# Patient Record
Sex: FEMALE | Race: WHITE | NOT HISPANIC OR LATINO | URBAN - NONMETROPOLITAN AREA
[De-identification: names, ages, dates, MRNs, and addresses within clinical notes are randomized per-mention and may not be internally consistent; named-entity substitution may affect disease eponyms.]

---

## 2017-02-01 DIAGNOSIS — K21.9 GASTROESOPHAGEAL REFLUX DISEASE WITHOUT ESOPHAGITIS: ICD-10-CM

## 2017-02-01 DIAGNOSIS — R10.13 EPIGASTRIC PAIN: ICD-10-CM

## 2017-02-01 RX ORDER — SUCRALFATE 1 G/1
1 TABLET ORAL
Qty: 120 TAB | Refills: 0 | Status: SHIPPED | OUTPATIENT
Start: 2017-02-01 | End: 2017-08-11

## 2017-02-01 RX ORDER — DICYCLOMINE HYDROCHLORIDE 10 MG/1
10 CAPSULE ORAL
Qty: 40 CAP | Refills: 0 | Status: SHIPPED | OUTPATIENT
Start: 2017-02-01 | End: 2017-02-17 | Stop reason: SDUPTHER

## 2017-02-01 NOTE — TELEPHONE ENCOUNTER
I have refilled these. Please remind her that she has appt in February with labs to be done before that visit.

## 2017-02-01 NOTE — TELEPHONE ENCOUNTER
Was the patient seen in the last year in this department? Yes     Does patient have an active prescription for medications requested? No     Received Request Via: Patient     Ruth stating she has been feeling fine until yesterday, severe pain starting again. Asking for refills please.

## 2017-02-06 ENCOUNTER — OFFICE VISIT (OUTPATIENT)
Dept: MEDICAL GROUP | Facility: PHYSICIAN GROUP | Age: 62
End: 2017-02-06
Payer: COMMERCIAL

## 2017-02-06 VITALS
RESPIRATION RATE: 16 BRPM | HEART RATE: 84 BPM | WEIGHT: 128 LBS | DIASTOLIC BLOOD PRESSURE: 74 MMHG | TEMPERATURE: 97.1 F | OXYGEN SATURATION: 97 % | HEIGHT: 63 IN | SYSTOLIC BLOOD PRESSURE: 128 MMHG | BODY MASS INDEX: 22.68 KG/M2

## 2017-02-06 NOTE — MR AVS SNAPSHOT
"        Ruth Castrejonhy   2017 4:00 PM   Office Visit   MRN: [id number]    Department:  [facility]   Dept Phone:  [phone]    Description:  Female : 1955   Provider:  ZENON Rice           Reason for Visit     Rectal Bleeding resolved    Error           Allergies as of 2017     Allergen Noted Reactions    Codeine 10/14/2016   Nausea      You were diagnosed with     ERRONEOUS ENCOUNTER--DISREGARD   [[id number]]         Vital Signs     Blood Pressure Pulse Temperature Respirations Height Weight    128/74 mmHg 84 36.2 °C (97.1 °F) 16 1.6 m (5' 3\") 58.06 kg (128 lb)    Body Mass Index Oxygen Saturation Smoking Status             22.68 kg/m2 97% Former Smoker         Basic Information     Date Of Birth Sex Race Ethnicity Preferred Language    1955 Female White Non- English      Your appointments     2017  1:00 PM   Established Patient with ZENON Rice   [address] [address] [phone]           You will be receiving a confirmation call a few days before your appointment from our automated call confirmation system.              Problem List              ICD-10-CM Priority Class Noted - Resolved    Epigastric pain R10.13   10/14/2016 - Present    Stress F43.9   10/14/2016 - Present    Gastroesophageal reflux disease without esophagitis K21.9   10/14/2016 - Present    Urinary frequency R35.0   10/14/2016 - Present    Fatigue R53.83   11/15/2016 - Present      Health Maintenance        Date Due Completion Dates    IMM DTaP/Tdap/Td Vaccine (1 - Tdap) 10/24/1975 ---    PAP SMEAR 10/24/1977 ---    MAMMOGRAM 10/24/1996 ---    IMM ZOSTER VACCINE 10/24/2016 ---    COLONOSCOPY 10/31/2026 10/31/2016            Current Immunizations     Influenza Vaccine Quad Inj (Pf) 11/15/2016  1:00 PM      Below and/or attached are the medications your provider expects you to take. Review all of your " home medications and newly ordered medications with your provider and/or pharmacist. Follow medication instructions as directed by your provider and/or pharmacist. Please keep your medication list with you and share with your provider. Update the information when medications are discontinued, doses are changed, or new medications (including over-the-counter products) are added; and carry medication information at all times in the event of emergency situations     Allergies:  CODEINE - Nausea               Medications  Valid as of: February 06, 2017 -  4:23 PM    Generic Name Brand Name Tablet Size Instructions for use    Dicyclomine HCl (Cap) BENTYL 10 MG Take 1 Cap by mouth 4 Times a Day,Before Meals and at Bedtime.        Pantoprazole Sodium (Tablet Delayed Response) PROTONIX 40 MG Take 1 Tab by mouth every day.        Solifenacin Succinate (Tab) VESICARE 10 MG Take 1 Tab by mouth every day.        Sucralfate (Tab) CARAFATE 1 GM Take 1 Tab by mouth 4 Times a Day,Before Meals and at Bedtime.        .                 Medicines prescribed today were sent to:     [facility] [address] [place] [id number]    Phone: [phone] Fax: [phone]    Open 24 Hours?: No      Medication refill instructions:       If your prescription bottle indicates you have medication refills left, it is not necessary to call your provider’s office. Please contact your pharmacy and they will refill your medication.    If your prescription bottle indicates you do not have any refills left, you may request refills at any time through one of the following ways: The online Retail Info system (except Urgent Care), by calling your provider’s office, or by asking your pharmacy to contact your provider’s office with a refill request. Medication refills are processed only during regular business hours and may not be available until the next business day. Your provider may request  additional information or to have a follow-up visit with you prior to refilling your medication.   *Please Note: Medication refills are assigned a new Rx number when refilled electronically. Your pharmacy may indicate that no refills were authorized even though a new prescription for the same medication is available at the pharmacy. Please request the medicine by name with the pharmacy before contacting your provider for a refill.           [facility] Access Code: [email or web address]  Expires: 3/8/2017  4:23 PM    Your email address is not on file at [facility].  Email Addresses are required for you to sign up for [facility], please contact [phone] to verify your personal information and to provide your email address prior to attempting to register for [facility] [address]    [facility]  A secure, online tool to manage your health information     [facility]’s [facility]® is a secure, online tool that connects you to your personalized health information from the privacy of your home -- day or night - making it very easy for you to manage your healthcare. Once the activation process is completed, you can even access your medical information using the [facility] aj, which is available for free in the Apple Aj store or Google Play store.     To learn more about [facility], visit [email or web address]    There are two levels of access available (as shown below):   My Chart Features  [facility] Primary Care Doctor [facility]  Specialists [facility]  Urgent  Care Non-[facility] Primary Care Doctor   Email your healthcare team securely and privately 24/7 X X X    Manage appointments: schedule your next appointment; view details of past/upcoming appointments X      Request prescription refills. X      View recent personal medical records, including lab and immunizations X X X X   View health record, including health history, allergies, medications X X X X   Read reports about your outpatient visits,  procedures, consult and ER notes X X X X   See your discharge summary, which is a recap of your hospital and/or ER visit that includes your diagnosis, lab results, and care plan X X  X     How to register for [employer]:  Once your e-mail address has been verified, follow the following steps to sign up for [employer].     1. Go to  [email or web address]  2. Click on the Sign Up Now box, which takes you to the New Member Sign Up page. You will need to provide the following information:  a. Enter your [employer] Access Code exactly as it appears at the top of this page. (You will not need to use this code after you’ve completed the sign-up process. If you do not sign up before the expiration date, you must request a new code.)   b. Enter your date of birth.   c. Enter your home email address.   d. Click Submit, and follow the next screen’s instructions.  3. Create a [employer] ID. This will be your [employer] login ID and cannot be changed, so think of one that is secure and easy to remember.  4. Create a [employer] password. You can change your password at any time.  5. Enter your Password Reset Question and Answer. This can be used at a later time if you forget your password.   6. Enter your e-mail address. This allows you to receive e-mail notifications when new information is available in [employer].  7. Click Sign Up. You can now view your health information.    For assistance activating your [employer] account, call [phone]

## 2017-02-10 ENCOUNTER — HOSPITAL ENCOUNTER (OUTPATIENT)
Dept: LAB | Facility: MEDICAL CENTER | Age: 62
End: 2017-02-10
Attending: NURSE PRACTITIONER
Payer: COMMERCIAL

## 2017-02-10 DIAGNOSIS — E55.9 VITAMIN D DEFICIENCY: ICD-10-CM

## 2017-02-10 DIAGNOSIS — R53.83 FATIGUE, UNSPECIFIED TYPE: ICD-10-CM

## 2017-02-10 LAB
25(OH)D3 SERPL-MCNC: 16 NG/ML (ref 30–100)
FERRITIN SERPL-MCNC: 192.4 NG/ML (ref 10–291)

## 2017-02-10 PROCEDURE — 36415 COLL VENOUS BLD VENIPUNCTURE: CPT

## 2017-02-10 PROCEDURE — 82306 VITAMIN D 25 HYDROXY: CPT

## 2017-02-10 PROCEDURE — 82728 ASSAY OF FERRITIN: CPT

## 2017-02-17 ENCOUNTER — HOSPITAL ENCOUNTER (OUTPATIENT)
Facility: MEDICAL CENTER | Age: 62
End: 2017-02-17
Attending: NURSE PRACTITIONER
Payer: COMMERCIAL

## 2017-02-17 ENCOUNTER — OFFICE VISIT (OUTPATIENT)
Dept: MEDICAL GROUP | Facility: PHYSICIAN GROUP | Age: 62
End: 2017-02-17
Payer: COMMERCIAL

## 2017-02-17 VITALS
WEIGHT: 128 LBS | TEMPERATURE: 98.5 F | DIASTOLIC BLOOD PRESSURE: 84 MMHG | BODY MASS INDEX: 22.68 KG/M2 | HEIGHT: 63 IN | SYSTOLIC BLOOD PRESSURE: 136 MMHG | OXYGEN SATURATION: 96 % | RESPIRATION RATE: 16 BRPM | HEART RATE: 96 BPM

## 2017-02-17 DIAGNOSIS — Z12.4 SCREENING FOR CERVICAL CANCER: ICD-10-CM

## 2017-02-17 DIAGNOSIS — Z12.39 SCREENING FOR BREAST CANCER: ICD-10-CM

## 2017-02-17 DIAGNOSIS — R35.0 URINARY FREQUENCY: ICD-10-CM

## 2017-02-17 DIAGNOSIS — R79.89 ELEVATED FERRITIN: ICD-10-CM

## 2017-02-17 DIAGNOSIS — E55.9 VITAMIN D DEFICIENCY: ICD-10-CM

## 2017-02-17 DIAGNOSIS — K21.9 GASTROESOPHAGEAL REFLUX DISEASE WITHOUT ESOPHAGITIS: ICD-10-CM

## 2017-02-17 DIAGNOSIS — R10.13 EPIGASTRIC PAIN: ICD-10-CM

## 2017-02-17 PROCEDURE — 87624 HPV HI-RISK TYP POOLED RSLT: CPT

## 2017-02-17 PROCEDURE — 99214 OFFICE O/P EST MOD 30 MIN: CPT | Performed by: NURSE PRACTITIONER

## 2017-02-17 PROCEDURE — 88175 CYTOPATH C/V AUTO FLUID REDO: CPT

## 2017-02-17 RX ORDER — SOLIFENACIN SUCCINATE 10 MG/1
10 TABLET, FILM COATED ORAL DAILY
Qty: 90 TAB | Refills: 1 | Status: SHIPPED | OUTPATIENT
Start: 2017-02-17 | End: 2017-06-28 | Stop reason: SDUPTHER

## 2017-02-17 RX ORDER — DICYCLOMINE HYDROCHLORIDE 10 MG/1
10 CAPSULE ORAL
Qty: 60 CAP | Refills: 1 | Status: SHIPPED | OUTPATIENT
Start: 2017-02-17 | End: 2017-08-11

## 2017-02-17 RX ORDER — PANTOPRAZOLE SODIUM 40 MG/1
40 TABLET, DELAYED RELEASE ORAL DAILY
Qty: 90 TAB | Refills: 1 | Status: SHIPPED | OUTPATIENT
Start: 2017-02-17 | End: 2017-06-28 | Stop reason: SDUPTHER

## 2017-02-17 NOTE — ASSESSMENT & PLAN NOTE
Chronic in nature, stable, controlled on Vesicare 10 mg daily. She does need refills, these were called in for her. She tolerates the medication well with no significant bothersome side effects. We will monitor this annually and as needed.

## 2017-02-17 NOTE — ASSESSMENT & PLAN NOTE
Chronic in nature, stable well-controlled on Protonix, as needed Bentyl and occasional use of Carafate. She does need refills on her Protonix, this was called in for her. I also gave her educational material on foods to avoid with reflux. She tolerates the medication well with no significant bothersome side effects. We will monitor this every 6 months and as needed.

## 2017-02-17 NOTE — ASSESSMENT & PLAN NOTE
Patient here for well woman exam with Pap smear. We will also check for HPV as well. She tells me she has had 2 abnormal Pap smears in the past. She does not know the exact nature of the abnormality. She does not have any pressing concerns with vaginal discharge, odor or itch. She is postmenopausal and does have vaginal atrophy and at times painful intercourse. She does not complain of postmenopausal bleeding. I did tell her I would call her with results and any further actions if needed.  She tells me she has not had a mammogram done in several years, this was ordered. She denies doing monthly breast self exams. Mammogram is ordered.

## 2017-02-17 NOTE — ASSESSMENT & PLAN NOTE
Patient here to review labs, her vitamin D level is 16. This is minimally improved from 11. She tells me she is only taking 400 units over-the-counter. She was unable to  the 50,000 units that was called to her pharmacy due to cost. I have encouraged her to take 2000 units daily she can get this over-the-counter. We will recheck this in 6 months.

## 2017-02-17 NOTE — PROGRESS NOTES
Chief Complaint   Patient presents with   • Follow-Up     Labs         This is a 61 y.o.female patient that presents today with the following: Follow-up visit, acute and chronic conditions, Pap smear    Gastroesophageal reflux disease without esophagitis  Chronic in nature, stable well-controlled on Protonix, as needed Bentyl and occasional use of Carafate. She does need refills on her Protonix, this was called in for her. I also gave her educational material on foods to avoid with reflux. She tolerates the medication well with no significant bothersome side effects. We will monitor this every 6 months and as needed.    Urinary frequency  Chronic in nature, stable, controlled on Vesicare 10 mg daily. She does need refills, these were called in for her. She tolerates the medication well with no significant bothersome side effects. We will monitor this annually and as needed.    Vitamin D deficiency  Patient here to review labs, her vitamin D level is 16. This is minimally improved from 11. She tells me she is only taking 400 units over-the-counter. She was unable to  the 50,000 units that was called to her pharmacy due to cost. I have encouraged her to take 2000 units daily she can get this over-the-counter. We will recheck this in 6 months.    Screening for cervical cancer  Patient here for well woman exam with Pap smear. We will also check for HPV as well. She tells me she has had 2 abnormal Pap smears in the past. She does not know the exact nature of the abnormality. She does not have any pressing concerns with vaginal discharge, odor or itch. She is postmenopausal and does have vaginal atrophy and at times painful intercourse. She does not complain of postmenopausal bleeding. I did tell her I would call her with results and any further actions if needed.  She tells me she has not had a mammogram done in several years, this was ordered. She denies doing monthly breast self exams. Mammogram is  ordered.    Elevated ferritin  Back in November 2016 patient's ferritin level was 657.5. During that time she was having a lot of gastrointestinal issues, with inflammation seen on EGD and colonoscopy. She denied use of iron supplements, she does not have family history of hemachromatosis. She is here today review of repeat ferritin which is now within normal limits. I did discuss with her that her elevation was likely due to an inflammatory process but we will continue to monitor this. We'll recheck again in 6 months.      Hospital Outpatient Visit on 02/10/2017   Component Date Value   • Ferritin 02/10/2017 192.4    • 25-Hydroxy   Vitamin D 25 02/10/2017 16*         clinical course has been stable    Past Medical History   Diagnosis Date   • GERD (gastroesophageal reflux disease)        Past Surgical History   Procedure Laterality Date   • Other       surgery for deviated septum       Family History   Problem Relation Age of Onset   • Hypertension Mother    • Diabetes Father    • Heart Attack Father    • Stroke Father        Codeine    Current Outpatient Prescriptions Ordered in [facility]   Medication Sig Dispense Refill   • pantoprazole (PROTONIX) 40 MG Tablet Delayed Response Take 1 Tab by mouth every day. 90 Tab 1   • solifenacin (VESICARE) 10 MG tablet Take 1 Tab by mouth every day. 90 Tab 1   • dicyclomine (BENTYL) 10 MG Cap Take 1 Cap by mouth 4 Times a Day,Before Meals and at Bedtime. 60 Cap 1   • sucralfate (CARAFATE) 1 GM Tab Take 1 Tab by mouth 4 Times a Day,Before Meals and at Bedtime. 120 Tab 0     No current Epic-ordered facility-administered medications on file.       Constitutional ROS: No unexpected change in weight, No weakness, No unexplained fevers, sweats, or chills  Neck ROS: No lumps or masses, No swollen glands, No significant pain in neck  Pulmonary ROS: No chronic cough, sputum, or hemoptysis, No shortness of breath, No recent change in breathing  Cardiovascular ROS: No chest pain, No edema, No  "palpitations  Gastrointestinal ROS: Positive for reflux, per history of present illness  Breast ROS: No new breast lumps or masses, No severe breast pain, No nipple discharge  Musculoskeletal/Extremities ROS: No clubbing, No cyanosis, No pain, redness or swelling on the joints  Neurologic ROS: Normal development, No seizures, No weakness    Physical exam:  /84 mmHg  Pulse 96  Temp(Src) 36.9 °C (98.5 °F)  Resp 16  Ht 1.6 m (5' 3\")  Wt 58.06 kg (128 lb)  BMI 22.68 kg/m2  SpO2 96%  Breastfeeding? No  General Appearance: Middle-aged female, alert, no distress, well-nourished, well-groomed  Skin: Skin color, texture, turgor normal. No rashes or lesions.  Lungs: negative findings: normal respiratory rate and rhythm, lungs clear to auscultation  Heart: negative. RRR without murmur, gallop, or rubs.  No ectopy.  Breast: negative findings: normal in size, skin normal, palpation negative for masses or nodules, no palpable axillary lymphadenopathy, positive findings: Slightly irregular shaped left breast  Abdomen: Abdomen soft, non-tender. BS normal. No masses,  No organomegaly  Musculoskeletal: negative  Neurologic: intact, oriented, mood appropriate, judgment intact. Cranial nerves II-12 grossly intact  Pelvic: External genitalia normal, cervical os very small, and vagina rugae mildly atrophied., Bimanual exam normal.    Medical decision making/discussion: Patient here for follow-up visit along with well woman exam. I will call her with results and further actions if needed. She is also here for follow-up of multiple chronic conditions and medication refills. We reviewed labs. She is to continue with vitamin D, 2000 units daily and we will recheck in 6 months. We will also recheck a ferritin level in 6 months. I have given her educational material on GERD as well as IBS. I have encouraged her to stand probiotic. She is to have mammogram done. I will see her back in 6 months for follow-up.    Ruth was seen " today for follow-up.    Diagnoses and all orders for this visit:    Gastroesophageal reflux disease without esophagitis  -     pantoprazole (PROTONIX) 40 MG Tablet Delayed Response; Take 1 Tab by mouth every day.  -     dicyclomine (BENTYL) 10 MG Cap; Take 1 Cap by mouth 4 Times a Day,Before Meals and at Bedtime.    Epigastric pain  -     dicyclomine (BENTYL) 10 MG Cap; Take 1 Cap by mouth 4 Times a Day,Before Meals and at Bedtime.    Urinary frequency  -     solifenacin (VESICARE) 10 MG tablet; Take 1 Tab by mouth every day.    Vitamin D deficiency  -     VITAMIN D,25 HYDROXY; Future    Screening for cervical cancer  -     THINPREP PAP WITH HPV; Future    Screening for breast cancer  -     MA-SCREEN MAMMO W/CAD-BILAT; Future    Elevated ferritin  -     FERRITIN; Future          Please note that this dictation was created using voice recognition software. I have made every reasonable attempt to correct obvious errors, but I expect that there are errors of grammar and possibly content that I did not discover before finalizing the note.

## 2017-02-17 NOTE — PATIENT INSTRUCTIONS
Take OTC vitamin D 2000 units daily--will recheck in 6 months    Stay on probiotic    Will call with results of PAP/HPV and any further actions if needed    Follow up in 6 months--have labs before that visit    Mammogram            Food Choices for Gastroesophageal Reflux Disease, Adult  When you have gastroesophageal reflux disease (GERD), the foods you eat and your eating habits are very important. Choosing the right foods can help ease the discomfort of GERD.  WHAT GENERAL GUIDELINES DO I NEED TO FOLLOW?  · Choose fruits, vegetables, whole grains, low-fat dairy products, and low-fat meat, fish, and poultry.  · Limit fats such as oils, salad dressings, butter, nuts, and avocado.  · Keep a food diary to identify foods that cause symptoms.  · Avoid foods that cause reflux. These may be different for different people.  · Eat frequent small meals instead of three large meals each day.  · Eat your meals slowly, in a relaxed setting.  · Limit fried foods.  · Cook foods using methods other than frying.  · Avoid drinking alcohol.  · Avoid drinking large amounts of liquids with your meals.  · Avoid bending over or lying down until 2-3 hours after eating.  WHAT FOODS ARE NOT RECOMMENDED?  The following are some foods and drinks that may worsen your symptoms:  Vegetables  Tomatoes. Tomato juice. Tomato and spaghetti sauce. Chili peppers. Onion and garlic. Horseradish.  Fruits  Oranges, grapefruit, and lemon (fruit and juice).  Meats  High-fat meats, fish, and poultry. This includes hot dogs, ribs, ham, sausage, salami, and clark.  Dairy  Whole milk and chocolate milk. Sour cream. Cream. Butter. Ice cream. Cream cheese.   Beverages  Coffee and tea, with or without caffeine. Carbonated beverages or energy drinks.  Condiments  Hot sauce. Barbecue sauce.   Sweets/Desserts  Chocolate and cocoa. Donuts. Peppermint and spearmint.  Fats and Oils  High-fat foods, including French fries and potato chips.  Other  Vinegar. Strong  spices, such as black pepper, white pepper, red pepper, cayenne, bella powder, cloves, guilherme, and chili powder.  The items listed above may not be a complete list of foods and beverages to avoid. Contact your dietitian for more information.     This information is not intended to replace advice given to you by your health care provider. Make sure you discuss any questions you have with your health care provider.     Document Released: 12/18/2006 Document Revised: 01/08/2016 Document Reviewed: 10/22/2014  [employer] Interactive Patient Education ©2016 [employer].    Irritable Bowel Syndrome, Adult  Irritable bowel syndrome (IBS) is not one specific disease. It is a group of symptoms that affects the organs responsible for digestion (gastrointestinal or GI tract).   To regulate how your GI tract works, your body sends signals back and forth between your intestines and your brain. If you have IBS, there may be a problem with these signals. As a result, your GI tract does not function normally. Your intestines may become more sensitive and overreact to certain things. This is especially true when you eat certain foods or when you are under stress.   There are four types of IBS. These may be determined based on the consistency of your stool:   · IBS with diarrhea.    · IBS with constipation.    · Mixed IBS.    · Unsubtyped IBS.    It is important to know which type of IBS you have. Some treatments are more likely to be helpful for certain types of IBS.   CAUSES   The exact cause of IBS is not known.  RISK FACTORS  You may have a higher risk of IBS if:  · You are a woman.  · You are younger than 45 years old.  · You have a family history of IBS.  · You have mental health problems.  · You have had bacterial infection of your GI tract.  SIGNS AND SYMPTOMS   Symptoms of IBS vary from person to person. The main symptom is abdominal pain or discomfort. Additional symptoms usually include one or more of the following:    · Diarrhea, constipation, or both.    · Abdominal swelling or bloating.    · Feeling full or sick after eating a small or regular-size meal.    · Frequent gas.    · Mucus in the stool.    · A feeling of having more stool left after a bowel movement.    Symptoms tend to come and go. They may be associated with stress, psychiatric conditions, or nothing at all.   DIAGNOSIS   There is no specific test to diagnose IBS. Your health care provider will make a diagnosis based on a physical exam, medical history, and your symptoms. You may have other tests to rule out other conditions that may be causing your symptoms. These may include:   · Blood tests.    · X-rays.    · CT scan.  · Endoscopy and colonoscopy. This is a test in which your GI tract is viewed with a long, thin, flexible tube.  TREATMENT  There is no cure for IBS, but treatment can help relieve symptoms. IBS treatment often includes:   · Changes to your diet, such as:  ¨ Eating more fiber.  ¨ Avoiding foods that cause symptoms.  ¨ Drinking more water.  ¨ Eating regular, medium-sized portioned meals.  · Medicines. These may include:  ¨ Fiber supplements if you have constipation.  ¨ Medicine to control diarrhea (antidiarrheal medicines).  ¨ Medicine to help control muscle spasms in your GI tract (antispasmodic medicines).  ¨ Medicines to help with any mental health issues, such as antidepressants or tranquilizers.  · Therapy.  ¨ Talk therapy may help with anxiety, depression, or other mental health issues that can make IBS symptoms worse.  · Stress reduction.  ¨ Managing your stress can help keep symptoms under control.  HOME CARE INSTRUCTIONS   · Take medicines only as directed by your health care provider.  · Eat a healthy diet.  ¨ Avoid foods and drinks with added sugar.  ¨ Include more whole grains, fruits, and vegetables gradually into your diet. This may be especially helpful if you have IBS with constipation.  ¨ Avoid any foods and drinks that make your  symptoms worse. These may include dairy products and caffeinated or carbonated drinks.  ¨ Do not eat large meals.  ¨ Drink enough fluid to keep your urine clear or pale yellow.  · Exercise regularly. Ask your health care provider for recommendations of good activities for you.  · Keep all follow-up visits as directed by your health care provider. This is important.  SEEK MEDICAL CARE IF:   · You have constant pain.  · You have trouble or pain with swallowing.  · You have worsening diarrhea.  SEEK IMMEDIATE MEDICAL CARE IF:   · You have severe and worsening abdominal pain.    · You have diarrhea and:    ¨ You have a rash, stiff neck, or severe headache.    ¨ You are irritable, sleepy, or difficult to awaken.    ¨ You are weak, dizzy, or extremely thirsty.    · You have bright red blood in your stool or you have black tarry stools.    · You have unusual abdominal swelling that is painful.    · You vomit continuously.    · You vomit blood (hematemesis).    · You have both abdominal pain and a fever.         This information is not intended to replace advice given to you by your health care provider. Make sure you discuss any questions you have with your health care provider.     Document Released: 12/18/2006 Document Revised: 01/08/2016 Document Reviewed: 09/04/2015  [employer] Interactive Patient Education ©2016 [employer] Inc.

## 2017-02-17 NOTE — ASSESSMENT & PLAN NOTE
Back in November 2016 patient's ferritin level was 657.5. During that time she was having a lot of gastrointestinal issues, with inflammation seen on EGD and colonoscopy. She denied use of iron supplements, she does not have family history of hemachromatosis. She is here today review of repeat ferritin which is now within normal limits. I did discuss with her that her elevation was likely due to an inflammatory process but we will continue to monitor this. We'll recheck again in 6 months.

## 2017-02-19 LAB
CYTOLOGY REG CYTOL: NORMAL
HPV HR 12 DNA CVX QL NAA+PROBE: NEGATIVE
HPV16 DNA SPEC QL NAA+PROBE: NEGATIVE
HPV18 DNA SPEC QL NAA+PROBE: NEGATIVE
SPECIMEN SOURCE: NORMAL

## 2017-04-27 ENCOUNTER — HOSPITAL ENCOUNTER (OUTPATIENT)
Dept: RADIOLOGY | Facility: MEDICAL CENTER | Age: 62
End: 2017-04-27
Attending: NURSE PRACTITIONER
Payer: COMMERCIAL

## 2017-04-27 DIAGNOSIS — Z12.39 SCREENING FOR BREAST CANCER: ICD-10-CM

## 2017-04-27 PROCEDURE — G0202 SCR MAMMO BI INCL CAD: HCPCS

## 2017-06-28 DIAGNOSIS — K21.9 GASTROESOPHAGEAL REFLUX DISEASE WITHOUT ESOPHAGITIS: ICD-10-CM

## 2017-06-28 DIAGNOSIS — R35.0 URINARY FREQUENCY: ICD-10-CM

## 2017-06-28 RX ORDER — PANTOPRAZOLE SODIUM 40 MG/1
40 TABLET, DELAYED RELEASE ORAL DAILY
Qty: 90 TAB | Refills: 0 | Status: SHIPPED | OUTPATIENT
Start: 2017-06-28 | End: 2017-08-17 | Stop reason: SDUPTHER

## 2017-06-28 RX ORDER — SOLIFENACIN SUCCINATE 10 MG/1
10 TABLET, FILM COATED ORAL DAILY
Qty: 90 TAB | Refills: 0 | Status: SHIPPED | OUTPATIENT
Start: 2017-06-28 | End: 2017-11-13 | Stop reason: SDUPTHER

## 2017-08-04 ENCOUNTER — HOSPITAL ENCOUNTER (OUTPATIENT)
Dept: LAB | Facility: MEDICAL CENTER | Age: 62
End: 2017-08-04
Attending: NURSE PRACTITIONER
Payer: COMMERCIAL

## 2017-08-04 DIAGNOSIS — R79.89 ELEVATED FERRITIN: ICD-10-CM

## 2017-08-04 DIAGNOSIS — E55.9 VITAMIN D DEFICIENCY: ICD-10-CM

## 2017-08-04 LAB
25(OH)D3 SERPL-MCNC: 11 NG/ML (ref 30–100)
FERRITIN SERPL-MCNC: 154 NG/ML (ref 10–291)

## 2017-08-04 PROCEDURE — 36415 COLL VENOUS BLD VENIPUNCTURE: CPT

## 2017-08-04 PROCEDURE — 82306 VITAMIN D 25 HYDROXY: CPT

## 2017-08-04 PROCEDURE — 82728 ASSAY OF FERRITIN: CPT

## 2017-08-07 DIAGNOSIS — E55.9 VITAMIN D DEFICIENCY: ICD-10-CM

## 2017-08-11 ENCOUNTER — OFFICE VISIT (OUTPATIENT)
Dept: MEDICAL GROUP | Facility: PHYSICIAN GROUP | Age: 62
End: 2017-08-11
Payer: COMMERCIAL

## 2017-08-11 ENCOUNTER — HOSPITAL ENCOUNTER (OUTPATIENT)
Dept: LAB | Facility: MEDICAL CENTER | Age: 62
End: 2017-08-11
Attending: NURSE PRACTITIONER
Payer: COMMERCIAL

## 2017-08-11 VITALS
RESPIRATION RATE: 12 BRPM | OXYGEN SATURATION: 95 % | HEIGHT: 61 IN | BODY MASS INDEX: 26.43 KG/M2 | WEIGHT: 140 LBS | HEART RATE: 84 BPM | SYSTOLIC BLOOD PRESSURE: 128 MMHG | TEMPERATURE: 99 F | DIASTOLIC BLOOD PRESSURE: 76 MMHG

## 2017-08-11 DIAGNOSIS — R53.83 FATIGUE, UNSPECIFIED TYPE: ICD-10-CM

## 2017-08-11 DIAGNOSIS — K21.9 GASTROESOPHAGEAL REFLUX DISEASE WITHOUT ESOPHAGITIS: ICD-10-CM

## 2017-08-11 DIAGNOSIS — R63.5 WEIGHT GAIN: ICD-10-CM

## 2017-08-11 DIAGNOSIS — R35.0 URINARY FREQUENCY: ICD-10-CM

## 2017-08-11 DIAGNOSIS — E55.9 VITAMIN D DEFICIENCY: ICD-10-CM

## 2017-08-11 LAB
T3 SERPL-MCNC: 79.6 NG/DL (ref 60–181)
T4 FREE SERPL-MCNC: 0.7 NG/DL (ref 0.53–1.43)
TSH SERPL DL<=0.005 MIU/L-ACNC: 4.28 UIU/ML (ref 0.3–3.7)

## 2017-08-11 PROCEDURE — 84443 ASSAY THYROID STIM HORMONE: CPT

## 2017-08-11 PROCEDURE — 84480 ASSAY TRIIODOTHYRONINE (T3): CPT

## 2017-08-11 PROCEDURE — 84439 ASSAY OF FREE THYROXINE: CPT

## 2017-08-11 PROCEDURE — 36415 COLL VENOUS BLD VENIPUNCTURE: CPT

## 2017-08-11 PROCEDURE — 99214 OFFICE O/P EST MOD 30 MIN: CPT | Performed by: NURSE PRACTITIONER

## 2017-08-11 RX ORDER — OXYBUTYNIN CHLORIDE 10 MG/1
10 TABLET, EXTENDED RELEASE ORAL DAILY
Qty: 90 TAB | Refills: 1 | Status: SHIPPED | OUTPATIENT
Start: 2017-08-11 | End: 2017-09-01

## 2017-08-11 RX ORDER — ERGOCALCIFEROL 1.25 MG/1
50000 CAPSULE ORAL
Qty: 12 CAP | Refills: 0 | Status: SHIPPED | OUTPATIENT
Start: 2017-08-11 | End: 2017-11-01 | Stop reason: SDUPTHER

## 2017-08-11 NOTE — ASSESSMENT & PLAN NOTE
Patient continues to have vitamin D deficiency, her most recent vitamin D level was 11, this has actually decreased from 16 about 6 months ago. She states she has continued taking vitamin D supplements--about 1000 units per day. 6 months ago she was prescribed a high dose weekly supplement but she did not ever picked this up from the pharmacy. I will reorder this, and she is also to take over-the-counter supplements on a daily basis--at least 5000 units and we will recheck vitamin D level in 3 months.

## 2017-08-11 NOTE — ASSESSMENT & PLAN NOTE
Patient continues with ongoing fatigue, hair loss and complete lack of energy. She has been checked for iron deficiency anemia, B12 deficiency and hypothyroidism. She states she has been checked for hypothyroidism in the past but not only TSH checked but T4 and T3 as well. She is requesting that these labs be drawn.. Her B12 level in the past was well within normal limits. She does have significant vitamin D deficiency (see additional notes). She will have labs done today and I will notify her of results and further actions if needed. We will recheck vitamin D level in 3 months.

## 2017-08-11 NOTE — PATIENT INSTRUCTIONS
Have labs done--can have done today    Continue on meds    Follow up with me in 3 months, sooner if needed    Vitamin D 50,000 per week in addition to 5000 units daily    Vitamin D level in 3 months

## 2017-08-11 NOTE — ASSESSMENT & PLAN NOTE
This is a chronic condition, stable and usually well controlled on Vesicare 10 mg daily. However, she has been cutting this in half to make medication stretch as her insurance did not cover this but she didn't pay for it out of pocket which is greater than $800 for 90 days. She states it was working much better when she was able to take a full pill.   She is wanting to know if there is anything she can try that is more cost effective. She has never tried oxybutynin XL, we will call this in for her. We will start with 10 mg, she is to take this daily. I will see her back in 3 months for follow-up.

## 2017-08-11 NOTE — PROGRESS NOTES
Chief Complaint   Patient presents with   • Results     labs         This is a 61 y.o.female patient that presents today with the following: Follow-up visit, reviewed labs    Gastroesophageal reflux disease without esophagitis  This is a chronic condition, stable and well controlled on pantoprazole 40 mg daily. She tolerates medication well with no significant bothersome side effects. She states she is good about avoiding aggravating foods and activities. She does not need refills at this time, but can call when needed. We will monitor this at least every 6 months and as needed.    Vitamin D deficiency  Patient continues to have vitamin D deficiency, her most recent vitamin D level was 11, this has actually decreased from 16 about 6 months ago. She states she has continued taking vitamin D supplements--about 1000 units per day. 6 months ago she was prescribed a high dose weekly supplement but she did not ever picked this up from the pharmacy. I will reorder this, and she is also to take over-the-counter supplements on a daily basis--at least 5000 units and we will recheck vitamin D level in 3 months.    Urinary frequency  This is a chronic condition, stable and usually well controlled on Vesicare 10 mg daily. However, she has been cutting this in half to make medication stretch as her insurance did not cover this but she didn't pay for it out of pocket which is greater than $800 for 90 days. She states it was working much better when she was able to take a full pill.   She is wanting to know if there is anything she can try that is more cost effective. She has never tried oxybutynin XL, we will call this in for her. We will start with 10 mg, she is to take this daily. I will see her back in 3 months for follow-up.    Fatigue  Patient continues with ongoing fatigue, hair loss and complete lack of energy. She has been checked for iron deficiency anemia, B12 deficiency and hypothyroidism. She states she has been  checked for hypothyroidism in the past but not only TSH checked but T4 and T3 as well. She is requesting that these labs be drawn.. Her B12 level in the past was well within normal limits. She does have significant vitamin D deficiency (see additional notes). She will have labs done today and I will notify her of results and further actions if needed. We will recheck vitamin D level in 3 months.      Hospital Outpatient Visit on 08/04/2017   Component Date Value   • 25-Hydroxy   Vitamin D 25 08/04/2017 11*   • Ferritin 08/04/2017 154.0          clinical course has been stable    Past Medical History   Diagnosis Date   • GERD (gastroesophageal reflux disease)        Past Surgical History   Procedure Laterality Date   • Other       surgery for deviated septum       Family History   Problem Relation Age of Onset   • Hypertension Mother    • Diabetes Father    • Heart Attack Father    • Stroke Father        Codeine    Current Outpatient Prescriptions Ordered in [facility]   Medication Sig Dispense Refill   • oxybutynin SR (DITROPAN-XL) 10 MG CR tablet Take 1 Tab by mouth every day. 90 Tab 1   • ergocalciferol (DRISDOL) 10727 UNIT capsule Take 1 Cap by mouth every 7 days. 12 Cap 0   • pantoprazole (PROTONIX) 40 MG Tablet Delayed Response Take 1 Tab by mouth every day. 90 Tab 0   • solifenacin (VESICARE) 10 MG tablet Take 1 Tab by mouth every day. 90 Tab 0     No current Epic-ordered facility-administered medications on file.       Constitutional ROS: No unexpected change in weight, No weakness, No unexplained fevers, sweats, or chills  Pulmonary ROS: No chronic cough, sputum, or hemoptysis, No shortness of breath, No recent change in breathing  Cardiovascular ROS: No chest pain, No edema, No palpitations  Gastrointestinal ROS: No abdominal pain, No nausea, vomiting, diarrhea, or constipation, no blood in stool, Positive for reflux, controlled  Musculoskeletal/Extremities ROS: No clubbing, No peripheral edema, No pain, redness  "or swelling on the joints  Neurologic ROS: Normal development, No seizures, No weakness  Endocrine ROS: Positive per history of present illness    Physical exam:  /76 mmHg  Pulse 84  Temp(Src) 37.2 °C (99 °F)  Resp 12  Ht 1.549 m (5' 0.98\")  Wt 63.504 kg (140 lb)  BMI 26.47 kg/m2  SpO2 95%  General Appearance: Middle-aged older female, alert, no distress, well-nourished, well-groomed  Skin: Skin color, texture, turgor normal. No rashes or lesions.  Lungs: negative findings: normal respiratory rate and rhythm, lungs clear to auscultation  Heart: negative. RRR without murmur, gallop, or rubs.  No ectopy.  Abdomen: Abdomen soft, non-tender. BS normal. No masses,  No organomegaly  Musculoskeletal: negative findings: ROM of all joints is normal, no evidence of joint instability, strength normal, no deformities present  Neurologic: intact, oriented, mood appropriate, judgment intact. Cranial nerves II through XII grossly intact    Medical decision making/discussion: Patient here for follow-up visit, review labs. We will change medication to oxybutynin SR, 10 mg daily. She will have labs sent today I will notify her of results and further actions if needed. She is going to start high-dose weekly vitamin D supplements, 50,000 units. She is also to continue on over-the-counter vitamin D supplements daily as well. She will follow-up with me in 3 months, sooner if needed.    Ruth was seen today for results.    Diagnoses and all orders for this visit:    Urinary frequency  -     oxybutynin SR (DITROPAN-XL) 10 MG CR tablet; Take 1 Tab by mouth every day.    Fatigue, unspecified type  -     TSH; Future  -     FREE THYROXINE; Future  -     TRIIDOTHYRONINE; Future    Weight gain    Vitamin D deficiency  -     ergocalciferol (DRISDOL) 75156 UNIT capsule; Take 1 Cap by mouth every 7 days.  -     VITAMIN D,25 HYDROXY; Future    Gastroesophageal reflux disease without esophagitis          Please note that this dictation " was created using voice recognition software. I have made every reasonable attempt to correct obvious errors, but I expect that there are errors of grammar and possibly content that I did not discover before finalizing the note.

## 2017-08-11 NOTE — ASSESSMENT & PLAN NOTE
This is a chronic condition, stable and well controlled on pantoprazole 40 mg daily. She tolerates medication well with no significant bothersome side effects. She states she is good about avoiding aggravating foods and activities. She does not need refills at this time, but can call when needed. We will monitor this at least every 6 months and as needed.

## 2017-08-11 NOTE — MR AVS SNAPSHOT
"        Ruth Shoemaker   2017 1:00 PM   Office Visit   MRN: [id number]    Department:  [facility]   Dept Phone:  [phone]    Description:  Female : 1955   Provider:  ZENON Rice           Reason for Visit     Results labs      Allergies as of 2017     Allergen Noted Reactions    Codeine 10/14/2016   Nausea      You were diagnosed with     Urinary frequency   [788.41.ICD-9-CM]       Fatigue, unspecified type   [6376118]       Weight gain   [188191]       Vitamin D deficiency   [9459626]         Vital Signs     Blood Pressure Pulse Temperature Respirations Height Weight    128/76 mmHg 84 37.2 °C (99 °F) 12 1.549 m (5' 0.98\") 63.504 kg (140 lb)    Body Mass Index Oxygen Saturation Smoking Status             26.47 kg/m2 95% Former Smoker         Basic Information     Date Of Birth Sex Race Ethnicity Preferred Language    1955 Female White Non- English      Your appointments     2017  1:00 PM   Established Patient with ZENON Rice   [address] [address] [phone]           You will be receiving a confirmation call a few days before your appointment from our automated call confirmation system.              Problem List              ICD-10-CM Priority Class Noted - Resolved    Epigastric pain R10.13   10/14/2016 - Present    Stress F43.9   10/14/2016 - Present    Gastroesophageal reflux disease without esophagitis K21.9   10/14/2016 - Present    Urinary frequency R35.0   10/14/2016 - Present    Fatigue R53.83   11/15/2016 - Present    Vitamin D deficiency E55.9   2017 - Present    Screening for cervical cancer Z12.4   2017 - Present    Elevated ferritin R79.89   2017 - Present      Health Maintenance        Date Due Completion Dates    IMM DTaP/Tdap/Td Vaccine (1 - Tdap) 10/24/1975 ---    IMM ZOSTER VACCINE 10/24/2016 ---    IMM INFLUENZA (1) 2017 " 11/15/2016    MAMMOGRAM 4/27/2018 4/27/2017    PAP SMEAR 2/17/2020 2/17/2017    COLONOSCOPY 10/31/2026 10/31/2016            Current Immunizations     Influenza Vaccine Quad Inj (Pf) 11/15/2016  1:00 PM      Below and/or attached are the medications your provider expects you to take. Review all of your home medications and newly ordered medications with your provider and/or pharmacist. Follow medication instructions as directed by your provider and/or pharmacist. Please keep your medication list with you and share with your provider. Update the information when medications are discontinued, doses are changed, or new medications (including over-the-counter products) are added; and carry medication information at all times in the event of emergency situations     Allergies:  CODEINE - Nausea               Medications  Valid as of: August 11, 2017 -  1:37 PM    Generic Name Brand Name Tablet Size Instructions for use    Ergocalciferol (Cap) DRISDOL 07578 UNIT Take 1 Cap by mouth every 7 days.        Oxybutynin Chloride (TABLET SR 24 HR) DITROPAN-XL 10 MG Take 1 Tab by mouth every day.        Pantoprazole Sodium (Tablet Delayed Response) PROTONIX 40 MG Take 1 Tab by mouth every day.        Solifenacin Succinate (Tab) VESICARE 10 MG Take 1 Tab by mouth every day.        .                 Medicines prescribed today were sent to:     [facility] [address] [place] [address]    Phone: [phone] Fax: [phone]    Open 24 Hours?: No    [facility] - [place], [place] - [address] [place] [address]    Phone: [phone] Fax: [phone]    Open 24 Hours?: No      Medication refill instructions:       If your prescription bottle indicates you have medication refills left, it is not necessary to call your provider’s office. Please contact your pharmacy and they will refill your medication.    If your  prescription bottle indicates you do not have any refills left, you may request refills at any time through one of the following ways: The online [employer] system (except Urgent Care), by calling your provider’s office, or by asking your pharmacy to contact your provider’s office with a refill request. Medication refills are processed only during regular business hours and may not be available until the next business day. Your provider may request additional information or to have a follow-up visit with you prior to refilling your medication.   *Please Note: Medication refills are assigned a new Rx number when refilled electronically. Your pharmacy may indicate that no refills were authorized even though a new prescription for the same medication is available at the pharmacy. Please request the medicine by name with the pharmacy before contacting your provider for a refill.        Your To Do List     Future Labs/Procedures Complete By Expires    VITAMIN D,25 HYDROXY  11/10/2017 8/11/2018    FREE THYROXINE  As directed 8/11/2018    TRIIDOTHYRONINE  As directed 8/11/2018    TSH  As directed 8/11/2018      Instructions    Have labs done--can have done today    Continue on meds    Follow up with me in 3 months, sooner if needed    Vitamin D 50,000 per week in addition to 5000 units daily    Vitamin D level in 3 months          [employer] Access Code: Activation code not generated  Current [employer] Status: Active

## 2017-08-13 DIAGNOSIS — R79.89 ELEVATED TSH: ICD-10-CM

## 2017-08-17 DIAGNOSIS — K21.9 GASTROESOPHAGEAL REFLUX DISEASE WITHOUT ESOPHAGITIS: ICD-10-CM

## 2017-08-17 RX ORDER — PANTOPRAZOLE SODIUM 40 MG/1
40 TABLET, DELAYED RELEASE ORAL DAILY
Qty: 90 TAB | Refills: 1 | Status: SHIPPED | OUTPATIENT
Start: 2017-08-17 | End: 2017-09-04 | Stop reason: SDUPTHER

## 2017-08-17 RX ORDER — PANTOPRAZOLE SODIUM 40 MG/1
TABLET, DELAYED RELEASE ORAL
Qty: 90 TAB | Refills: 0 | OUTPATIENT
Start: 2017-08-17

## 2017-08-17 NOTE — TELEPHONE ENCOUNTER
Was the patient seen in the last year in this department? Yes     Does patient have an active prescription for medications requested? No     Received Request Via: Pharmacy      Pt met protocol?: Yes pt last ov 8/17

## 2017-08-29 DIAGNOSIS — K21.9 GASTROESOPHAGEAL REFLUX DISEASE WITHOUT ESOPHAGITIS: ICD-10-CM

## 2017-08-30 RX ORDER — PANTOPRAZOLE SODIUM 40 MG/1
TABLET, DELAYED RELEASE ORAL
Refills: 1 | OUTPATIENT
Start: 2017-08-30

## 2017-09-01 DIAGNOSIS — K21.9 GASTROESOPHAGEAL REFLUX DISEASE WITHOUT ESOPHAGITIS: ICD-10-CM

## 2017-09-01 DIAGNOSIS — R35.0 URINARY FREQUENCY: ICD-10-CM

## 2017-09-01 DIAGNOSIS — R82.90 MALODOROUS URINE: ICD-10-CM

## 2017-09-01 RX ORDER — TOLTERODINE 4 MG/1
4 CAPSULE, EXTENDED RELEASE ORAL DAILY
Qty: 30 CAP | Refills: 3 | Status: SHIPPED | OUTPATIENT
Start: 2017-09-01 | End: 2018-01-27 | Stop reason: SDUPTHER

## 2017-09-04 DIAGNOSIS — K21.9 GASTROESOPHAGEAL REFLUX DISEASE WITHOUT ESOPHAGITIS: ICD-10-CM

## 2017-09-04 RX ORDER — PANTOPRAZOLE SODIUM 40 MG/1
40 TABLET, DELAYED RELEASE ORAL DAILY
Qty: 90 TAB | Refills: 3 | Status: SHIPPED | OUTPATIENT
Start: 2017-09-04 | End: 2018-09-05 | Stop reason: SDUPTHER

## 2017-09-05 ENCOUNTER — HOSPITAL ENCOUNTER (OUTPATIENT)
Dept: LAB | Facility: MEDICAL CENTER | Age: 62
End: 2017-09-05
Attending: NURSE PRACTITIONER
Payer: COMMERCIAL

## 2017-09-05 DIAGNOSIS — R82.90 MALODOROUS URINE: ICD-10-CM

## 2017-09-05 LAB
APPEARANCE UR: CLEAR
BACTERIA #/AREA URNS HPF: ABNORMAL /HPF
BILIRUB UR QL STRIP.AUTO: NEGATIVE
CAOX CRY #/AREA URNS HPF: ABNORMAL /HPF
COLOR UR: YELLOW
CULTURE IF INDICATED INDCX: YES UA CULTURE
EPI CELLS #/AREA URNS HPF: ABNORMAL /HPF
GLUCOSE UR STRIP.AUTO-MCNC: NEGATIVE MG/DL
HYALINE CASTS #/AREA URNS LPF: ABNORMAL /LPF
KETONES UR STRIP.AUTO-MCNC: NEGATIVE MG/DL
LEUKOCYTE ESTERASE UR QL STRIP.AUTO: NEGATIVE
MICRO URNS: ABNORMAL
NITRITE UR QL STRIP.AUTO: POSITIVE
PH UR STRIP.AUTO: 6 [PH]
PROT UR QL STRIP: NEGATIVE MG/DL
RBC # URNS HPF: ABNORMAL /HPF
RBC UR QL AUTO: NEGATIVE
SP GR UR STRIP.AUTO: 1.03
UROBILINOGEN UR STRIP.AUTO-MCNC: 0.2 MG/DL
WBC #/AREA URNS HPF: ABNORMAL /HPF

## 2017-09-05 PROCEDURE — 87186 SC STD MICRODIL/AGAR DIL: CPT

## 2017-09-05 PROCEDURE — 81001 URINALYSIS AUTO W/SCOPE: CPT

## 2017-09-05 PROCEDURE — 87086 URINE CULTURE/COLONY COUNT: CPT

## 2017-09-05 PROCEDURE — 87077 CULTURE AEROBIC IDENTIFY: CPT

## 2017-09-06 DIAGNOSIS — R30.0 DYSURIA: ICD-10-CM

## 2017-09-06 RX ORDER — NITROFURANTOIN 25; 75 MG/1; MG/1
100 CAPSULE ORAL 2 TIMES DAILY
Qty: 10 CAP | Refills: 0 | Status: SHIPPED | OUTPATIENT
Start: 2017-09-06 | End: 2017-11-13

## 2017-09-07 LAB
BACTERIA UR CULT: ABNORMAL
SIGNIFICANT IND 70042: ABNORMAL
SOURCE SOURCE: ABNORMAL

## 2017-09-12 ENCOUNTER — TELEPHONE (OUTPATIENT)
Dept: MEDICAL GROUP | Facility: PHYSICIAN GROUP | Age: 62
End: 2017-09-12

## 2017-09-12 NOTE — TELEPHONE ENCOUNTER
MEDICATION PRIOR AUTHORIZATION NEEDED:    1. Name of Medication: detrol la    2. Requested By (Name of Pharmacy): [facility]     3. Is insurance on file current? yes    4. What is the name & phone number of the 3rd party payor? [employer] [phone]             FINAL PRIOR AUTHORIZATION STATUS:    1.  Name of Medication & Dose: Tolterodine     2. Prior Auth Status: Approved through CoverMyMeds      3. Action Taken: Pharmacy Notified: yes Patient Notified: n/a

## 2017-11-06 ENCOUNTER — HOSPITAL ENCOUNTER (OUTPATIENT)
Dept: LAB | Facility: MEDICAL CENTER | Age: 62
End: 2017-11-06
Attending: NURSE PRACTITIONER
Payer: COMMERCIAL

## 2017-11-06 DIAGNOSIS — R30.0 DYSURIA: ICD-10-CM

## 2017-11-06 DIAGNOSIS — R82.90 MALODOROUS URINE: ICD-10-CM

## 2017-11-06 DIAGNOSIS — R79.89 ELEVATED TSH: ICD-10-CM

## 2017-11-06 DIAGNOSIS — E55.9 VITAMIN D DEFICIENCY: ICD-10-CM

## 2017-11-06 LAB
25(OH)D3 SERPL-MCNC: 46 NG/ML (ref 30–100)
APPEARANCE UR: CLEAR
BACTERIA #/AREA URNS HPF: ABNORMAL /HPF
BILIRUB UR QL STRIP.AUTO: NEGATIVE
COLOR UR: YELLOW
CULTURE IF INDICATED INDCX: YES UA CULTURE
EPI CELLS #/AREA URNS HPF: ABNORMAL /HPF
GLUCOSE UR STRIP.AUTO-MCNC: NEGATIVE MG/DL
HYALINE CASTS #/AREA URNS LPF: ABNORMAL /LPF
KETONES UR STRIP.AUTO-MCNC: NEGATIVE MG/DL
LEUKOCYTE ESTERASE UR QL STRIP.AUTO: ABNORMAL
MICRO URNS: ABNORMAL
NITRITE UR QL STRIP.AUTO: POSITIVE
PH UR STRIP.AUTO: 6 [PH]
PROT UR QL STRIP: NEGATIVE MG/DL
RBC # URNS HPF: ABNORMAL /HPF
RBC UR QL AUTO: NEGATIVE
SP GR UR STRIP.AUTO: 1.01
TSH SERPL DL<=0.005 MIU/L-ACNC: 3.1 UIU/ML (ref 0.3–3.7)
UROBILINOGEN UR STRIP.AUTO-MCNC: 0.2 MG/DL
WBC #/AREA URNS HPF: ABNORMAL /HPF

## 2017-11-06 PROCEDURE — 81001 URINALYSIS AUTO W/SCOPE: CPT

## 2017-11-06 PROCEDURE — 84443 ASSAY THYROID STIM HORMONE: CPT

## 2017-11-06 PROCEDURE — 82306 VITAMIN D 25 HYDROXY: CPT

## 2017-11-06 PROCEDURE — 87077 CULTURE AEROBIC IDENTIFY: CPT

## 2017-11-06 PROCEDURE — 87086 URINE CULTURE/COLONY COUNT: CPT

## 2017-11-06 PROCEDURE — 36415 COLL VENOUS BLD VENIPUNCTURE: CPT

## 2017-11-06 PROCEDURE — 87186 SC STD MICRODIL/AGAR DIL: CPT

## 2017-11-06 RX ORDER — NITROFURANTOIN 25; 75 MG/1; MG/1
100 CAPSULE ORAL 2 TIMES DAILY
Qty: 10 CAP | Refills: 0 | Status: SHIPPED | OUTPATIENT
Start: 2017-11-06 | End: 2017-11-13

## 2017-11-08 LAB
BACTERIA UR CULT: ABNORMAL
SIGNIFICANT IND 70042: ABNORMAL
SOURCE SOURCE: ABNORMAL

## 2017-11-13 ENCOUNTER — OFFICE VISIT (OUTPATIENT)
Dept: MEDICAL GROUP | Facility: PHYSICIAN GROUP | Age: 62
End: 2017-11-13
Payer: COMMERCIAL

## 2017-11-13 VITALS
DIASTOLIC BLOOD PRESSURE: 84 MMHG | SYSTOLIC BLOOD PRESSURE: 142 MMHG | HEIGHT: 63 IN | WEIGHT: 131 LBS | HEART RATE: 92 BPM | RESPIRATION RATE: 20 BRPM | BODY MASS INDEX: 23.21 KG/M2 | OXYGEN SATURATION: 98 % | TEMPERATURE: 97.9 F

## 2017-11-13 DIAGNOSIS — R79.89 ABNORMAL TSH: ICD-10-CM

## 2017-11-13 DIAGNOSIS — R30.0 DYSURIA: ICD-10-CM

## 2017-11-13 DIAGNOSIS — E55.9 VITAMIN D DEFICIENCY: ICD-10-CM

## 2017-11-13 DIAGNOSIS — Z23 NEED FOR INFLUENZA VACCINATION: ICD-10-CM

## 2017-11-13 DIAGNOSIS — R35.0 URINARY FREQUENCY: ICD-10-CM

## 2017-11-13 PROCEDURE — 99214 OFFICE O/P EST MOD 30 MIN: CPT | Mod: 25 | Performed by: NURSE PRACTITIONER

## 2017-11-13 PROCEDURE — 90686 IIV4 VACC NO PRSV 0.5 ML IM: CPT | Performed by: NURSE PRACTITIONER

## 2017-11-13 PROCEDURE — 90471 IMMUNIZATION ADMIN: CPT | Performed by: NURSE PRACTITIONER

## 2017-11-13 RX ORDER — SOLIFENACIN SUCCINATE 10 MG/1
10 TABLET, FILM COATED ORAL DAILY
Qty: 90 TAB | Refills: 1 | Status: SHIPPED
Start: 2017-11-13 | End: 2018-11-14 | Stop reason: SDUPTHER

## 2017-11-13 RX ORDER — SULFAMETHOXAZOLE AND TRIMETHOPRIM 800; 160 MG/1; MG/1
1 TABLET ORAL 2 TIMES DAILY
Qty: 14 TAB | Refills: 0 | Status: SHIPPED | OUTPATIENT
Start: 2017-11-13 | End: 2018-10-16

## 2017-11-13 ASSESSMENT — PATIENT HEALTH QUESTIONNAIRE - PHQ9: CLINICAL INTERPRETATION OF PHQ2 SCORE: 0

## 2017-11-13 NOTE — ASSESSMENT & PLAN NOTE
This is a chronic condition, stable and well-controlled with vitamin D supplementation, 50,000 units weekly. She has successfully brought her vitamin D level up to 46, it was less than 10 when first measured this.

## 2017-11-13 NOTE — PROGRESS NOTES
Chief Complaint   Patient presents with   • Cystitis         This is a 62 y.o.female patient that presents today with the following:Follow-up visit, discuss labs, discuss acute and chronic conditions    Abnormal TSH  62-year-old female patient presents today to go over labs. She has been concerned about her thyroid due to symptoms she is having such as hair loss and fatigue. Her TSH is mildly elevated in August 2017. Her most recent repeat thyroid labs are within normal limits. We will continue to monitor this.    Dysuria  Patient continues to have symptoms of dysuria and lower abdominal pain. She was recently started on Macrobid, she did not tolerate this due to side effects. Will have her take Bactrim DS one pill twice a day for 7 days.    Urinary frequency  This is a chronic condition, fairly well controlled on Detrol LA 4 mg daily. She does report to me that she had the best symptom r controlled with Vesicare 10 mg daily, but has been unable to take this due to cost. In fact it costs her $800 for a 90 day supply due to lack of coverage from her insurance. Her daughter has told her about filling her prescriptions at a [place] pharmacy, she just needs written prescription. When she has found is that it is $300 for a 90 day supply through [employer]. Written prescription was given to her. I encouraged her to continue on the Detrol until her Vesicare can be filled. I did discuss with her referring to urology for further evaluation, she declines at this time due to cost and lack of adequate insurance coverage. She states she simply cannot afford to see urologist at this time.    Vitamin D deficiency  This is a chronic condition, stable and well-controlled with vitamin D supplementation, 50,000 units weekly. She has successfully brought her vitamin D level up to 46, it was less than 10 when first measured this.      Hospital Outpatient Visit on 11/06/2017   Component Date Value   • 25-Hydroxy   Vitamin D 25 11/06/2017 46     • TSH 11/06/2017 3.100    • Color 11/06/2017 Yellow    • Character 11/06/2017 Clear    • Specific Gravity 11/06/2017 1.009    • Ph 11/06/2017 6.0    • Glucose 11/06/2017 Negative    • Ketones 11/06/2017 Negative    • Protein 11/06/2017 Negative    • Bilirubin 11/06/2017 Negative    • Urobilinogen, Urine 11/06/2017 0.2    • Nitrite 11/06/2017 Positive*   • Leukocyte Esterase 11/06/2017 Moderate*   • Occult Blood 11/06/2017 Negative    • Micro Urine Req 11/06/2017 Microscopic    • Culture Indicated 11/06/2017 Yes    • Significant Indicator 11/08/2017 POS    • Source 11/08/2017 UR    • Urine Culture 11/08/2017 *                    Value:Escherichia coli  >100,000 cfu/mL     • WBC 11/06/2017 5-10*   • RBC 11/06/2017 0-2    • Bacteria 11/06/2017 Many*   • Epithelial Cells 11/06/2017 Few    • Hyaline Cast 11/06/2017 0-2          clinical course has been stable    Past Medical History:   Diagnosis Date   • GERD (gastroesophageal reflux disease)        Past Surgical History:   Procedure Laterality Date   • OTHER      surgery for deviated septum       Family History   Problem Relation Age of Onset   • Hypertension Mother    • Diabetes Father    • Heart Attack Father    • Stroke Father        Codeine    Current Outpatient Prescriptions Ordered in [facility]   Medication Sig Dispense Refill   • solifenacin (VESICARE) 10 MG tablet Take 1 Tab by mouth every day. 90 Tab 1   • sulfamethoxazole-trimethoprim (BACTRIM DS) 800-160 MG tablet Take 1 Tab by mouth 2 times a day. 14 Tab 0   • vitamin D, Ergocalciferol, (DRISDOL) 87243 units Cap capsule TAKE ONE CAPSULE BY MOUTH ONCE A WEEK 12 Cap 0   • pantoprazole (PROTONIX) 40 MG Tablet Delayed Response Take 1 Tab by mouth every day. 90 Tab 3   • tolterodine ER (DETROL LA) 4 MG CAPSULE SR 24 HR Take 1 Cap by mouth every day. 30 Cap 3     No current Epic-ordered facility-administered medications on file.        Constitutional ROS: No unexpected change in weight, No weakness, No unexplained  "fevers, sweats, or chills  Pulmonary ROS: No chronic cough, sputum, or hemoptysis, No shortness of breath, No recent change in breathing  Cardiovascular ROS: No chest pain, No edema, No palpitations  Gastrointestinal ROS: No abdominal pain, No nausea, vomiting, diarrhea, or constipation, no blood in stool  Musculoskeletal/Extremities ROS: No clubbing, No peripheral edema, No pain, redness or swelling on the joints  Neurologic ROS: Normal development, No seizures, No weakness  Genitourinary ROS: Positive per history of present illness  Endocrine ROS: Positive per history of present illness    Physical exam:  /84   Pulse 92   Temp 36.6 °C (97.9 °F)   Resp 20   Ht 1.6 m (5' 3\")   Wt 59.4 kg (131 lb)   SpO2 98%   BMI 23.21 kg/m²   General Appearance: Middle-aged older female, alert, no distress, well-nourished, well-groomed  Skin: Skin color, texture, turgor normal. No rashes or lesions.  Lungs: negative findings: normal respiratory rate and rhythm, lungs clear to auscultation  Heart: negative. RRR without murmur, gallop, or rubs.  No ectopy.  Abdomen: Abdomen soft, non-tender. BS normal. No masses,  No organomegaly  Musculoskeletal: negative findings: ROM of all joints is normal, no evidence of joint instability, strength normal, no deformities present  Neurologic: intact, oriented, mood appropriate, judgment intact. Cranial nerves II-12 grossly intact    Medical decision making/discussion: Patient is to follow-up with me in 6 months, sooner if needed. I would like her to have labs to before she follows up with me, she will get flu shot today. Written prescription for Vesicare was given to her since she consented to [place] pharmacy. She is to bring in lab form for me to fill out so that she can get low-cost labs done.  She is to stop the Macrobid, we'll have her start Bactrim DS, one pill twice a day for 7 days.  Ruth was seen today for cystitis.    Diagnoses and all orders for this visit:    Urinary " frequency  -     solifenacin (VESICARE) 10 MG tablet; Take 1 Tab by mouth every day.    Dysuria  -     sulfamethoxazole-trimethoprim (BACTRIM DS) 800-160 MG tablet; Take 1 Tab by mouth 2 times a day.    Abnormal TSH    Vitamin D deficiency    Need for influenza vaccination  -     INFLUENZA VACCINE QUAD INJ >3Y(PF)          Please note that this dictation was created using voice recognition software. I have made every reasonable attempt to correct obvious errors, but I expect that there are errors of grammar and possibly content that I did not discover before finalizing the note.

## 2017-11-13 NOTE — PATIENT INSTRUCTIONS
Follow up with me in 6 months, sooner if needed    Stop the Macrobid and start Bactrim DS 1 pill twice daily for 7 days    Flu shot    Bring in that lab form

## 2017-11-13 NOTE — ASSESSMENT & PLAN NOTE
This is a chronic condition, fairly well controlled on Detrol LA 4 mg daily. She does report to me that she had the best symptom r controlled with Vesicare 10 mg daily, but has been unable to take this due to cost. In fact it costs her $800 for a 90 day supply due to lack of coverage from her insurance. Her daughter has told her about filling her prescriptions at a [place] pharmacy, she just needs written prescription. When she has found is that it is $300 for a 90 day supply through [employer]. Written prescription was given to her. I encouraged her to continue on the Detrol until her Vesicare can be filled. I did discuss with her referring to urology for further evaluation, she declines at this time due to cost and lack of adequate insurance coverage. She states she simply cannot afford to see urologist at this time.

## 2017-11-13 NOTE — ASSESSMENT & PLAN NOTE
62-year-old female patient presents today to go over labs. She has been concerned about her thyroid due to symptoms she is having such as hair loss and fatigue. Her TSH is mildly elevated in August 2017. Her most recent repeat thyroid labs are within normal limits. We will continue to monitor this.

## 2017-11-13 NOTE — ASSESSMENT & PLAN NOTE
Patient continues to have symptoms of dysuria and lower abdominal pain. She was recently started on Macrobid, she did not tolerate this due to side effects. Will have her take Bactrim DS one pill twice a day for 7 days.

## 2018-01-27 DIAGNOSIS — R35.0 URINARY FREQUENCY: ICD-10-CM

## 2018-01-27 DIAGNOSIS — E55.9 VITAMIN D DEFICIENCY: ICD-10-CM

## 2018-01-30 RX ORDER — TOLTERODINE 4 MG/1
CAPSULE, EXTENDED RELEASE ORAL
Qty: 90 CAP | Refills: 0 | Status: SHIPPED | OUTPATIENT
Start: 2018-01-30 | End: 2018-11-14

## 2018-01-30 RX ORDER — ERGOCALCIFEROL 1.25 MG/1
50000 CAPSULE ORAL
Qty: 12 CAP | Refills: 0 | Status: SHIPPED
Start: 2018-01-30 | End: 2020-02-26

## 2018-01-30 NOTE — TELEPHONE ENCOUNTER
Was the patient seen in the last year in this department? Yes     Does patient have an active prescription for medications requested? No     Received Request Via: Pharmacy      Pt met protocol?: Yes, OV 11/17, labs done 11/17

## 2018-05-07 ENCOUNTER — HOSPITAL ENCOUNTER (OUTPATIENT)
Dept: LAB | Facility: MEDICAL CENTER | Age: 63
End: 2018-05-07
Attending: NURSE PRACTITIONER
Payer: COMMERCIAL

## 2018-05-14 ENCOUNTER — OFFICE VISIT (OUTPATIENT)
Dept: MEDICAL GROUP | Facility: PHYSICIAN GROUP | Age: 63
End: 2018-05-14
Payer: COMMERCIAL

## 2018-05-14 VITALS
DIASTOLIC BLOOD PRESSURE: 78 MMHG | BODY MASS INDEX: 26.05 KG/M2 | HEART RATE: 76 BPM | SYSTOLIC BLOOD PRESSURE: 122 MMHG | OXYGEN SATURATION: 97 % | HEIGHT: 63 IN | RESPIRATION RATE: 16 BRPM | WEIGHT: 147 LBS | TEMPERATURE: 98.3 F

## 2018-05-14 DIAGNOSIS — K21.9 GASTROESOPHAGEAL REFLUX DISEASE WITHOUT ESOPHAGITIS: ICD-10-CM

## 2018-05-14 DIAGNOSIS — R79.89 ABNORMAL TSH: ICD-10-CM

## 2018-05-14 DIAGNOSIS — R35.0 URINARY FREQUENCY: ICD-10-CM

## 2018-05-14 DIAGNOSIS — E55.9 VITAMIN D DEFICIENCY: ICD-10-CM

## 2018-05-14 DIAGNOSIS — Z13.6 SCREENING FOR CARDIOVASCULAR CONDITION: ICD-10-CM

## 2018-05-14 PROCEDURE — 99214 OFFICE O/P EST MOD 30 MIN: CPT | Performed by: NURSE PRACTITIONER

## 2018-05-14 NOTE — ASSESSMENT & PLAN NOTE
Patient continues to have symptoms of urinary frequency, she has been tried on other medications such as Detrol and Ditropan with minimal relief, but symptoms seem to be well controlled with Vesicare. Unfortunately she is unable to afford this medication. She did have a previous prescription that she paid cash for--she breaks his medication in half. I did offer to send her to urology for further evaluation, she continues to decline due to cost and lack of adequate insurance coverage.

## 2018-05-14 NOTE — ASSESSMENT & PLAN NOTE
Patient does have history of abnormal TSH, her last TSH was well within normal limits. She does continue to deny symptoms of hypothyroidism. She will be due for labs in the fall, these have been ordered.

## 2018-05-14 NOTE — ASSESSMENT & PLAN NOTE
This is a chronic condition, stable and well-controlled with pantoprazole. She does not need refills, but can call as needed. She tolerated this medication well with no significant bothersome side effects.

## 2018-05-14 NOTE — ASSESSMENT & PLAN NOTE
This is a chronic condition, stable and well-controlled with over-the-counter vitamin D supplement. She was previously on a high-dose weekly supplement, but she was able to bring her vitamin D up to normal limits and continues on an over-the-counter supplement. She will be due for labs again in the fall, these have been ordered.

## 2018-05-14 NOTE — PROGRESS NOTES
Chief Complaint   Patient presents with   • Vitamin D Deficiency     6 month fv         This is a 62 y.o.female patient that presents today with the following: Follow-up visit    Gastroesophageal reflux disease without esophagitis  This is a chronic condition, stable and well-controlled with pantoprazole. She does not need refills, but can call as needed. She tolerated this medication well with no significant bothersome side effects.    Vitamin D deficiency  This is a chronic condition, stable and well-controlled with over-the-counter vitamin D supplement. She was previously on a high-dose weekly supplement, but she was able to bring her vitamin D up to normal limits and continues on an over-the-counter supplement. She will be due for labs again in the fall, these have been ordered.    Urinary frequency  Patient continues to have symptoms of urinary frequency, she has been tried on other medications such as Detrol and Ditropan with minimal relief, but symptoms seem to be well controlled with Vesicare. Unfortunately she is unable to afford this medication. She did have a previous prescription that she paid cash for--she breaks his medication in half. I did offer to send her to urology for further evaluation, she continues to decline due to cost and lack of adequate insurance coverage.    Abnormal TSH  Patient does have history of abnormal TSH, her last TSH was well within normal limits. She does continue to deny symptoms of hypothyroidism. She will be due for labs in the fall, these have been ordered.      No visits with results within 1 Month(s) from this visit.   Latest known visit with results is:   Hospital Outpatient Visit on 11/06/2017   Component Date Value   • 25-Hydroxy   Vitamin D 25 11/06/2017 46    • TSH 11/06/2017 3.100    • Color 11/06/2017 Yellow    • Character 11/06/2017 Clear    • Specific Gravity 11/06/2017 1.009    • Ph 11/06/2017 6.0    • Glucose 11/06/2017 Negative    • Ketones 11/06/2017  Negative    • Protein 11/06/2017 Negative    • Bilirubin 11/06/2017 Negative    • Urobilinogen, Urine 11/06/2017 0.2    • Nitrite 11/06/2017 Positive*   • Leukocyte Esterase 11/06/2017 Moderate*   • Occult Blood 11/06/2017 Negative    • Micro Urine Req 11/06/2017 Microscopic    • Culture Indicated 11/06/2017 Yes    • Significant Indicator 11/06/2017 POS    • Source 11/06/2017 UR    • Urine Culture 11/06/2017 *                    Value:Escherichia coli  >100,000 cfu/mL     • WBC 11/06/2017 5-10*   • RBC 11/06/2017 0-2    • Bacteria 11/06/2017 Many*   • Epithelial Cells 11/06/2017 Few    • Hyaline Cast 11/06/2017 0-2          clinical course has been stable    Past Medical History:   Diagnosis Date   • GERD (gastroesophageal reflux disease)        Past Surgical History:   Procedure Laterality Date   • OTHER      surgery for deviated septum       Family History   Problem Relation Age of Onset   • Hypertension Mother    • Diabetes Father    • Heart Attack Father    • Stroke Father        Codeine    Current Outpatient Prescriptions Ordered in [facility]   Medication Sig Dispense Refill   • vitamin D, Ergocalciferol, (DRISDOL) 65922 units Cap capsule Take 1 Cap by mouth every 7 days. 12 Cap 0   • solifenacin (VESICARE) 10 MG tablet Take 1 Tab by mouth every day. 90 Tab 1   • pantoprazole (PROTONIX) 40 MG Tablet Delayed Response Take 1 Tab by mouth every day. 90 Tab 3   • tolterodine ER (DETROL LA) 4 MG CAPSULE SR 24 HR TAKE ONE CAPSULE BY MOUTH ONCE DAILY 90 Cap 0   • sulfamethoxazole-trimethoprim (BACTRIM DS) 800-160 MG tablet Take 1 Tab by mouth 2 times a day. 14 Tab 0     No current Epic-ordered facility-administered medications on file.        Constitutional ROS: No unexpected change in weight, No weakness, No unexplained fevers, sweats, or chills  Pulmonary ROS: No chronic cough, sputum, or hemoptysis, No shortness of breath, No recent change in breathing  Cardiovascular ROS: No chest pain, No edema, No  "palpitations  Gastrointestinal ROS: No abdominal pain, No nausea, vomiting, diarrhea, or constipation, Positive for reflux  Musculoskeletal/Extremities ROS: No clubbing, No peripheral edema, No pain, redness or swelling on the joints  Neurologic ROS: Normal development, No seizures, No weakness  Endocrine ROS: positive per HPI    Physical exam:  /78   Pulse 76   Temp 36.8 °C (98.3 °F)   Resp 16   Ht 1.6 m (5' 3\")   Wt 66.7 kg (147 lb)   SpO2 97%   BMI 26.04 kg/m²   General Appearance: Older female, alert, no distress, well-nourished, well-groomed  Skin: Skin color, texture, turgor normal. No rashes or lesions.  Lungs: negative findings: normal respiratory rate and rhythm, lungs clear to auscultation  Heart: negative. RRR without murmur, gallop, or rubs.  No ectopy.  Abdomen: Abdomen soft, non-tender. BS normal. No masses,  No organomegaly  Musculoskeletal: negative findings: ROM of all joints is normal, strength normal, no deformities present  Neurologic: intact, oriented, mood appropriate, judgment intact. Cranial nerves II through XII grossly intact    Medical decision making/discussion: Patient to follow up with me in November with labs done before her visit. She is to take her medications as prescribed and call for refills as needed. She is to work on healthy diet, regular physical activity and continued efforts towards maintaining healthy weight.    Ruth was seen today for vitamin d deficiency.    Diagnoses and all orders for this visit:    Gastroesophageal reflux disease without esophagitis    Vitamin D deficiency  -     VITAMIN D,25 HYDROXY; Future    Abnormal TSH  -     TSH WITH REFLEX TO FT4; Future    Urinary frequency    Screening for cardiovascular condition  -     COMP METABOLIC PANEL; Future  -     LIPID PROFILE; Future          Please note that this dictation was created using voice recognition software. I have made every reasonable attempt to correct obvious errors, but I expect that " there are errors of grammar and possibly content that I did not discover before finalizing the note.

## 2018-09-05 DIAGNOSIS — K21.9 GASTROESOPHAGEAL REFLUX DISEASE WITHOUT ESOPHAGITIS: ICD-10-CM

## 2018-09-05 RX ORDER — PANTOPRAZOLE SODIUM 40 MG/1
TABLET, DELAYED RELEASE ORAL
Qty: 90 TAB | Refills: 0 | Status: SHIPPED | OUTPATIENT
Start: 2018-09-05 | End: 2018-12-11

## 2018-10-09 DIAGNOSIS — R82.90 MALODOROUS URINE: ICD-10-CM

## 2018-10-12 ENCOUNTER — HOSPITAL ENCOUNTER (OUTPATIENT)
Dept: LAB | Facility: MEDICAL CENTER | Age: 63
End: 2018-10-12
Attending: NURSE PRACTITIONER
Payer: COMMERCIAL

## 2018-10-12 DIAGNOSIS — R82.90 MALODOROUS URINE: ICD-10-CM

## 2018-10-12 LAB
APPEARANCE UR: CLEAR
BACTERIA #/AREA URNS HPF: ABNORMAL /HPF
BILIRUB UR QL STRIP.AUTO: NEGATIVE
COLOR UR: YELLOW
EPI CELLS #/AREA URNS HPF: NEGATIVE /HPF
GLUCOSE UR STRIP.AUTO-MCNC: NEGATIVE MG/DL
HYALINE CASTS #/AREA URNS LPF: ABNORMAL /LPF
KETONES UR STRIP.AUTO-MCNC: NEGATIVE MG/DL
LEUKOCYTE ESTERASE UR QL STRIP.AUTO: ABNORMAL
MICRO URNS: ABNORMAL
NITRITE UR QL STRIP.AUTO: NEGATIVE
PH UR STRIP.AUTO: 6 [PH]
PROT UR QL STRIP: NEGATIVE MG/DL
RBC # URNS HPF: ABNORMAL /HPF
RBC UR QL AUTO: ABNORMAL
SP GR UR STRIP.AUTO: 1.02
UROBILINOGEN UR STRIP.AUTO-MCNC: 0.2 MG/DL
WBC #/AREA URNS HPF: ABNORMAL /HPF

## 2018-10-12 PROCEDURE — 81001 URINALYSIS AUTO W/SCOPE: CPT

## 2018-10-12 PROCEDURE — 87186 SC STD MICRODIL/AGAR DIL: CPT

## 2018-10-12 PROCEDURE — 87086 URINE CULTURE/COLONY COUNT: CPT

## 2018-10-12 PROCEDURE — 87077 CULTURE AEROBIC IDENTIFY: CPT

## 2018-10-14 LAB
BACTERIA UR CULT: ABNORMAL
BACTERIA UR CULT: ABNORMAL
SIGNIFICANT IND 70042: ABNORMAL
SITE SITE: ABNORMAL
SOURCE SOURCE: ABNORMAL

## 2018-10-16 DIAGNOSIS — R30.0 DYSURIA: ICD-10-CM

## 2018-10-16 RX ORDER — SULFAMETHOXAZOLE AND TRIMETHOPRIM 800; 160 MG/1; MG/1
1 TABLET ORAL 2 TIMES DAILY
Qty: 14 TAB | Refills: 0 | Status: SHIPPED | OUTPATIENT
Start: 2018-10-16 | End: 2018-11-14

## 2018-11-13 ENCOUNTER — HOSPITAL ENCOUNTER (OUTPATIENT)
Dept: LAB | Facility: MEDICAL CENTER | Age: 63
End: 2018-11-13
Attending: NURSE PRACTITIONER
Payer: COMMERCIAL

## 2018-11-13 DIAGNOSIS — E55.9 VITAMIN D DEFICIENCY: ICD-10-CM

## 2018-11-13 DIAGNOSIS — R79.89 ABNORMAL TSH: ICD-10-CM

## 2018-11-13 DIAGNOSIS — Z13.6 SCREENING FOR CARDIOVASCULAR CONDITION: ICD-10-CM

## 2018-11-13 LAB
25(OH)D3 SERPL-MCNC: 30 NG/ML (ref 30–100)
ALBUMIN SERPL BCP-MCNC: 4.7 G/DL (ref 3.2–4.9)
ALBUMIN/GLOB SERPL: 1.6 G/DL
ALP SERPL-CCNC: 90 U/L (ref 30–99)
ALT SERPL-CCNC: 37 U/L (ref 2–50)
ANION GAP SERPL CALC-SCNC: 6 MMOL/L (ref 0–11.9)
AST SERPL-CCNC: 25 U/L (ref 12–45)
BILIRUB SERPL-MCNC: 0.7 MG/DL (ref 0.1–1.5)
BUN SERPL-MCNC: 15 MG/DL (ref 8–22)
CALCIUM SERPL-MCNC: 9.8 MG/DL (ref 8.5–10.5)
CHLORIDE SERPL-SCNC: 102 MMOL/L (ref 96–112)
CHOLEST SERPL-MCNC: 273 MG/DL (ref 100–199)
CO2 SERPL-SCNC: 28 MMOL/L (ref 20–33)
CREAT SERPL-MCNC: 0.85 MG/DL (ref 0.5–1.4)
FASTING STATUS PATIENT QL REPORTED: NORMAL
GLOBULIN SER CALC-MCNC: 2.9 G/DL (ref 1.9–3.5)
GLUCOSE SERPL-MCNC: 94 MG/DL (ref 65–99)
HDLC SERPL-MCNC: 57 MG/DL
LDLC SERPL CALC-MCNC: 184 MG/DL
POTASSIUM SERPL-SCNC: 3.8 MMOL/L (ref 3.6–5.5)
PROT SERPL-MCNC: 7.6 G/DL (ref 6–8.2)
SODIUM SERPL-SCNC: 136 MMOL/L (ref 135–145)
TRIGL SERPL-MCNC: 160 MG/DL (ref 0–149)
TSH SERPL DL<=0.005 MIU/L-ACNC: 4.48 UIU/ML (ref 0.38–5.33)

## 2018-11-13 PROCEDURE — 36415 COLL VENOUS BLD VENIPUNCTURE: CPT

## 2018-11-13 PROCEDURE — 80061 LIPID PANEL: CPT

## 2018-11-13 PROCEDURE — 84443 ASSAY THYROID STIM HORMONE: CPT

## 2018-11-13 PROCEDURE — 80053 COMPREHEN METABOLIC PANEL: CPT

## 2018-11-13 PROCEDURE — 82306 VITAMIN D 25 HYDROXY: CPT

## 2018-11-14 ENCOUNTER — OFFICE VISIT (OUTPATIENT)
Dept: MEDICAL GROUP | Facility: PHYSICIAN GROUP | Age: 63
End: 2018-11-14
Payer: COMMERCIAL

## 2018-11-14 ENCOUNTER — TELEPHONE (OUTPATIENT)
Dept: MEDICAL GROUP | Facility: PHYSICIAN GROUP | Age: 63
End: 2018-11-14

## 2018-11-14 VITALS
BODY MASS INDEX: 24.98 KG/M2 | HEIGHT: 63 IN | TEMPERATURE: 97.5 F | OXYGEN SATURATION: 96 % | SYSTOLIC BLOOD PRESSURE: 120 MMHG | DIASTOLIC BLOOD PRESSURE: 78 MMHG | HEART RATE: 74 BPM | RESPIRATION RATE: 16 BRPM | WEIGHT: 141 LBS

## 2018-11-14 DIAGNOSIS — Z23 NEED FOR INFLUENZA VACCINATION: ICD-10-CM

## 2018-11-14 DIAGNOSIS — R35.0 URINARY FREQUENCY: ICD-10-CM

## 2018-11-14 DIAGNOSIS — R10.84 GENERALIZED ABDOMINAL PAIN: ICD-10-CM

## 2018-11-14 DIAGNOSIS — F43.9 STRESS: ICD-10-CM

## 2018-11-14 DIAGNOSIS — E78.2 MIXED HYPERLIPIDEMIA: ICD-10-CM

## 2018-11-14 PROCEDURE — 90686 IIV4 VACC NO PRSV 0.5 ML IM: CPT | Performed by: NURSE PRACTITIONER

## 2018-11-14 PROCEDURE — 90471 IMMUNIZATION ADMIN: CPT | Performed by: NURSE PRACTITIONER

## 2018-11-14 PROCEDURE — 99214 OFFICE O/P EST MOD 30 MIN: CPT | Mod: 25 | Performed by: NURSE PRACTITIONER

## 2018-11-14 RX ORDER — SOLIFENACIN SUCCINATE 10 MG/1
10 TABLET, FILM COATED ORAL DAILY
Qty: 90 TAB | Refills: 3 | Status: SHIPPED | OUTPATIENT
Start: 2018-11-14 | End: 2019-02-13 | Stop reason: SDUPTHER

## 2018-11-14 NOTE — PATIENT INSTRUCTIONS
Schedule anytime an annual well visit    Will refill the Vesicare    See me in 3 months for follow and labs    Flu shot today    Preventing High Cholesterol  Cholesterol is a waxy, fat-like substance that your body needs in small amounts. Your liver makes all the cholesterol that your body needs. Having high cholesterol (hypercholesterolemia) increases your risk for heart disease and stroke. Extra (excess) cholesterol comes from the food you eat, such as animal-based fat (saturated fat) from meat and some dairy products.  High cholesterol can often be prevented with diet and lifestyle changes. If you already have high cholesterol, you can control it with diet and lifestyle changes, as well as medicine.  What nutrition changes can be made?  · Eat less saturated fat. Foods that contain saturated fat include red meat and some dairy products.  · Avoid processed meats, like clark and lunch meats.  · Avoid trans fats, which are found in margarine and some baked goods.  · Avoid foods and beverages that have added sugars.  · Eat more fruits, vegetables, and whole grains.  · Choose healthy sources of protein, such as fish, poultry, and nuts.  · Choose healthy sources of fat, such as:  ¨ Nuts.  ¨ Vegetable oils, especially olive oil.  ¨ Fish that have healthy fats (omega-3 fatty acids), such as mackerel or salmon.  What lifestyle changes can be made?  · Lose weight if you are overweight. Losing 5-10 lb (2.3-4.5 kg) can help prevent or control high cholesterol and reduce your risk for diabetes and high blood pressure. Ask your health care provider to help you with a diet and exercise plan to safely lose weight.  · Get enough exercise. Do at least 150 minutes of moderate-intensity exercise each week.  ¨ You could do this in short exercise sessions several times a day, or you could do longer exercise sessions a few times a week. For example, you could take a brisk 10-minute walk or bike ride, 3 times a day, for 5 days a  week.  · Do not smoke. If you need help quitting, ask your health care provider.  · Limit your alcohol intake. If you drink alcohol, limit alcohol intake to no more than 1 drink a day for nonpregnant women and 2 drinks a day for men. One drink equals 12 oz of beer, 5 oz of wine, or 1½ oz of hard liquor.  Why are these changes important?  If you have high cholesterol, deposits (plaques) may build up on the walls of your blood vessels. Plaques make the arteries narrower and stiffer, which can restrict or block blood flow and cause blood clots to form. This greatly increases your risk for heart attack and stroke. Making diet and lifestyle changes can reduce your risk for these life-threatening conditions.  What can I do to lower my risk?  · Manage your risk factors for high cholesterol. Talk with your health care provider about all of your risk factors and how to lower your risk.  · Manage other conditions that you have, such as diabetes or high blood pressure (hypertension).  · Have your cholesterol checked at regular intervals.  · Keep all follow-up visits as told by your health care provider. This is important.  How is this treated?  In addition to diet and lifestyle changes, your health care provider may recommend medicines to help lower cholesterol, such as a medicine to reduce the amount of cholesterol made in your liver. You may need medicine if:  · Diet and lifestyle changes do not lower your cholesterol enough.  · You have high cholesterol and other risk factors for heart disease or stroke.  Take over-the-counter and prescription medicines only as told by your health care provider.  Where to find more information:  · American Heart Association: www.heart.org/HEARTORG/Conditions/Cholesterol/Cholesterol_[facility]_001089_SubHomePage.jsp  · National Heart, Lung, and [facility] [place]: www.nhlbi.nih.gov/health/resources/heart/heart-cholesterol-hbc-what-html  Summary  · High cholesterol increases your risk for heart disease  and stroke. By keeping your cholesterol level low, you can reduce your risk for these conditions.  · Diet and lifestyle changes are the most important steps in preventing high cholesterol.  · Work with your health care provider to manage your risk factors, and have your blood tested regularly.  This information is not intended to replace advice given to you by your health care provider. Make sure you discuss any questions you have with your health care provider.  Document Released: 01/01/2017 Document Revised: 08/26/2017 Document Reviewed: 08/26/2017  Elsevier Interactive Patient Education © 2017 [employer]vier Inc.

## 2018-11-14 NOTE — PROGRESS NOTES
Chief Complaint   Patient presents with   • Annual Exam     fv labs         This is a 63 y.o.female patient that presents today with the following: Follow-up visit    Mixed hyperlipidemia  The 10-year ASCVD risk score ([place] HERNANDEZ Jr., et al., 2013) is: 4.3%    Values used to calculate the score:      Age: 63 years      Sex: Female      Is Non- : No      Diabetic: No      Tobacco smoker: No      Systolic Blood Pressure: 120 mmHg      Is BP treated: No      HDL Cholesterol: 57 mg/dL      Total Cholesterol: 273 mg/dL  She declines statin therapy at this time, discussed with her that I would be okay putting her on statin even though she is at low risk due to the elevated LDL at 184.  She is going to work on this, she was given printed educational material on preventing high cholesterol and we will plan on rechecking this again in 3 months.    Generalized abdominal pain  Patient reporting generalized abdominal pain, more so in bilateral lower quadrants that are sharp and very fleeting at times.  She does deny any other associated symptoms including nausea, vomiting, fever, constipation or diarrhea.  She does not know if she is been passing gas or not.  She is nontender with palpation to her abdomen.  We will continue to monitor this, advised her to start keeping note when she has the pain what other associated symptoms are present.  She is to follow-up with me if this increases in frequency and intensity.    Urinary frequency  Patient continues to struggle with symptoms of urinary frequency and mild incontinence.  She has been tried on other medications such as oxybutynin and tolterodine.  She gets the best symptom relief with Vesicare, she does need refills, this was called in for her.    Stress  Patient has been going through quite a bit of stress with personal issues.  She feels this is giving her feelings of being overwhelmed and quite tired.  She has had workup for fatigue in the past which all  has been negative for any conditions such as anemia or thyroid issues.  She feels that when she has less stress that she feels better.  She is going to work on coping with her stress and she is going to follow-up with me if her symptoms do not improve or if they worsen.  She does deny suicidal and homicidal ideations, hallucinations, racing thoughts or flights of ideas.  She does not feel that she has depression or anxiety.      Hospital Outpatient Visit on 11/13/2018   Component Date Value   • Sodium 11/13/2018 136    • Potassium 11/13/2018 3.8    • Chloride 11/13/2018 102    • Co2 11/13/2018 28    • Anion Gap 11/13/2018 6.0    • Glucose 11/13/2018 94    • Bun 11/13/2018 15    • Creatinine 11/13/2018 0.85    • Calcium 11/13/2018 9.8    • AST(SGOT) 11/13/2018 25    • ALT(SGPT) 11/13/2018 37    • Alkaline Phosphatase 11/13/2018 90    • Total Bilirubin 11/13/2018 0.7    • Albumin 11/13/2018 4.7    • Total Protein 11/13/2018 7.6    • Globulin 11/13/2018 2.9    • A-G Ratio 11/13/2018 1.6    • Cholesterol,Tot 11/13/2018 273*   • Triglycerides 11/13/2018 160*   • HDL 11/13/2018 57    • LDL 11/13/2018 184*   • 25-Hydroxy   Vitamin D 25 11/13/2018 30    • TSH 11/13/2018 4.480    • Fasting Status 11/13/2018 Fasting    • GFR If  11/13/2018 >60    • GFR If Non  Ameri* 11/13/2018 >60          clinical course has been stable    Past Medical History:   Diagnosis Date   • GERD (gastroesophageal reflux disease)        Past Surgical History:   Procedure Laterality Date   • OTHER      surgery for deviated septum       Family History   Problem Relation Age of Onset   • Hypertension Mother    • Diabetes Father    • Heart Attack Father    • Stroke Father        Codeine    Current Outpatient Prescriptions Ordered in [facility]   Medication Sig Dispense Refill   • vitamin D (CHOLECALCIFEROL) 1000 UNIT Tab Take 1,000 Units by mouth every day.     • solifenacin (VESICARE) 10 MG tablet Take 1 Tab by mouth every day. 90 Tab 3  "  • pantoprazole (PROTONIX) 40 MG Tablet Delayed Response TAKE ONE TABLET BY MOUTH ONCE DAILY 90 Tab 0   • vitamin D, Ergocalciferol, (DRISDOL) 03730 units Cap capsule Take 1 Cap by mouth every 7 days. 12 Cap 0     No current Epic-ordered facility-administered medications on file.        Constitutional ROS: No unexpected change in weight, No weakness, No unexplained fevers, sweats, or chills  Pulmonary ROS: No chronic cough, sputum, or hemoptysis, No shortness of breath, No recent change in breathing  Cardiovascular ROS: No chest pain, No edema, No palpitations  Gastrointestinal ROS: No nausea, vomiting, diarrhea, or constipation, Positive for reflux, generalized abdominal pain  Musculoskeletal/Extremities ROS: No clubbing, No peripheral edema, No pain, redness or swelling on the joints  Neurologic ROS: Normal development, No seizures, No weakness  Psych ROS: Positive for stress   ROS: Positive per HPI    Physical exam:  /78 (BP Location: Right arm, Patient Position: Sitting, BP Cuff Size: Adult)   Pulse 74   Temp 36.4 °C (97.5 °F) (Temporal)   Resp 16   Ht 1.6 m (5' 3\")   Wt 64 kg (141 lb)   SpO2 96%   BMI 24.98 kg/m²   General Appearance: Older female, alert, no distress, well-nourished, well-groomed  Skin: Skin color, texture, turgor normal. No rashes or lesions.  Lungs: negative findings: normal respiratory rate and rhythm, lungs clear to auscultation  Heart: negative. RRR without murmur, gallop, or rubs.  No ectopy.  Abdomen: Abdomen soft, non-tender. BS normal. No masses,  No organomegaly  Musculoskeletal: negative findings: no evidence of joint instability, strength normal, no deformities present  Neurologic: intact, CN II through XII grossly intact    Medical decision making/discussion: Patient given education material on preventing high cholesterol.  I would like her to follow-up with me in 3 months with labs done before visit.  She will get influenza immunization today.  Her Vesicare has " been refilled, she is to take this as prescribed.    Ruth was seen today for annual exam.    Diagnoses and all orders for this visit:    Mixed hyperlipidemia  -     Lipid Profile; Future    Generalized abdominal pain    Stress    Urinary frequency  -     solifenacin (VESICARE) 10 MG tablet; Take 1 Tab by mouth every day.    Need for influenza vaccination  -     Influenza Vaccine Quad Injection >3Y (PF)          Please note that this dictation was created using voice recognition software. I have made every reasonable attempt to correct obvious errors, but I expect that there are errors of grammar and possibly content that I did not discover before finalizing the note.

## 2018-11-14 NOTE — TELEPHONE ENCOUNTER
MEDICATION PRIOR AUTHORIZATION NEEDED:    1. Name of Medication: vesicare     2. Requested By (Name of Pharmacy): [facility]     3. Is insurance on file current? yes    4. What is the name & phone number of the 3rd party payor?   [employer]  Member ID: [id number]  Group: [id number]  Ph: [phone]  Husain: [phone]    Preferred alternatives:  Oxybutynin Chloride  Oxybutynin Chloride ER  Oxybutynin Chloride  Oxybutynin Chloride ER  Trospium Chloride   Trospium Chloride ER

## 2018-11-14 NOTE — ASSESSMENT & PLAN NOTE
The 10-year ASCVD risk score ([place] HERNANDEZ Jr., et al., 2013) is: 4.3%    Values used to calculate the score:      Age: 63 years      Sex: Female      Is Non- : No      Diabetic: No      Tobacco smoker: No      Systolic Blood Pressure: 120 mmHg      Is BP treated: No      HDL Cholesterol: 57 mg/dL      Total Cholesterol: 273 mg/dL  She declines statin therapy at this time, discussed with her that I would be okay putting her on statin even though she is at low risk due to the elevated LDL at 184.  She is going to work on this, she was given printed educational material on preventing high cholesterol and we will plan on rechecking this again in 3 months.

## 2018-11-15 NOTE — ASSESSMENT & PLAN NOTE
Patient has been going through quite a bit of stress with personal issues.  She feels this is giving her feelings of being overwhelmed and quite tired.  She has had workup for fatigue in the past which all has been negative for any conditions such as anemia or thyroid issues.  She feels that when she has less stress that she feels better.  She is going to work on coping with her stress and she is going to follow-up with me if her symptoms do not improve or if they worsen.  She does deny suicidal and homicidal ideations, hallucinations, racing thoughts or flights of ideas.  She does not feel that she has depression or anxiety.

## 2018-11-15 NOTE — ASSESSMENT & PLAN NOTE
Patient reporting generalized abdominal pain, more so in bilateral lower quadrants that are sharp and very fleeting at times.  She does deny any other associated symptoms including nausea, vomiting, fever, constipation or diarrhea.  She does not know if she is been passing gas or not.  She is nontender with palpation to her abdomen.  We will continue to monitor this, advised her to start keeping note when she has the pain what other associated symptoms are present.  She is to follow-up with me if this increases in frequency and intensity.

## 2018-11-15 NOTE — ASSESSMENT & PLAN NOTE
Patient continues to struggle with symptoms of urinary frequency and mild incontinence.  She has been tried on other medications such as oxybutynin and tolterodine.  She gets the best symptom relief with Vesicare, she does need refills, this was called in for her.

## 2018-12-10 DIAGNOSIS — K21.9 GASTROESOPHAGEAL REFLUX DISEASE WITHOUT ESOPHAGITIS: ICD-10-CM

## 2018-12-11 RX ORDER — PANTOPRAZOLE SODIUM 40 MG/1
TABLET, DELAYED RELEASE ORAL
Qty: 90 TAB | Refills: 0 | Status: SHIPPED | OUTPATIENT
Start: 2018-12-11 | End: 2019-03-05 | Stop reason: SDUPTHER

## 2018-12-11 NOTE — TELEPHONE ENCOUNTER
Was the patient seen in the last year in this department? Yes    Does patient have an active prescription for medications requested? No     Received Request Via: Patient      Pt met protocol?: Yes, ov 11/18 appt pcp 2/19

## 2019-02-08 ENCOUNTER — HOSPITAL ENCOUNTER (OUTPATIENT)
Dept: LAB | Facility: MEDICAL CENTER | Age: 64
End: 2019-02-08
Attending: NURSE PRACTITIONER
Payer: COMMERCIAL

## 2019-02-08 DIAGNOSIS — E78.2 MIXED HYPERLIPIDEMIA: ICD-10-CM

## 2019-02-08 LAB
CHOLEST SERPL-MCNC: 257 MG/DL (ref 100–199)
FASTING STATUS PATIENT QL REPORTED: NORMAL
HDLC SERPL-MCNC: 46 MG/DL
LDLC SERPL CALC-MCNC: 162 MG/DL
TRIGL SERPL-MCNC: 247 MG/DL (ref 0–149)

## 2019-02-08 PROCEDURE — 80061 LIPID PANEL: CPT

## 2019-02-08 PROCEDURE — 36415 COLL VENOUS BLD VENIPUNCTURE: CPT

## 2019-02-13 ENCOUNTER — OFFICE VISIT (OUTPATIENT)
Dept: MEDICAL GROUP | Facility: PHYSICIAN GROUP | Age: 64
End: 2019-02-13
Payer: COMMERCIAL

## 2019-02-13 VITALS
WEIGHT: 142 LBS | SYSTOLIC BLOOD PRESSURE: 142 MMHG | DIASTOLIC BLOOD PRESSURE: 88 MMHG | HEART RATE: 80 BPM | TEMPERATURE: 98.1 F | BODY MASS INDEX: 25.16 KG/M2 | RESPIRATION RATE: 16 BRPM | HEIGHT: 63 IN | OXYGEN SATURATION: 97 %

## 2019-02-13 DIAGNOSIS — N39.0 FREQUENT UTI: ICD-10-CM

## 2019-02-13 DIAGNOSIS — E55.9 VITAMIN D DEFICIENCY: ICD-10-CM

## 2019-02-13 DIAGNOSIS — Z12.39 SCREENING FOR BREAST CANCER: ICD-10-CM

## 2019-02-13 DIAGNOSIS — R35.0 URINARY FREQUENCY: ICD-10-CM

## 2019-02-13 DIAGNOSIS — E78.2 MIXED HYPERLIPIDEMIA: ICD-10-CM

## 2019-02-13 PROCEDURE — 99214 OFFICE O/P EST MOD 30 MIN: CPT | Performed by: NURSE PRACTITIONER

## 2019-02-13 RX ORDER — SOLIFENACIN SUCCINATE 10 MG/1
10 TABLET, FILM COATED ORAL DAILY
Qty: 90 TAB | Refills: 3 | Status: SHIPPED | OUTPATIENT
Start: 2019-02-13 | End: 2019-11-25 | Stop reason: SDUPTHER

## 2019-02-13 RX ORDER — SIMVASTATIN 20 MG
20 TABLET ORAL EVERY EVENING
Qty: 90 TAB | Refills: 3 | Status: SHIPPED | OUTPATIENT
Start: 2019-02-13 | End: 2019-12-31

## 2019-02-13 ASSESSMENT — PATIENT HEALTH QUESTIONNAIRE - PHQ9: CLINICAL INTERPRETATION OF PHQ2 SCORE: 0

## 2019-02-13 NOTE — ASSESSMENT & PLAN NOTE
Patient has history of vitamin D deficiency, this is well controlled with over-the-counter vitamin D supplements.

## 2019-02-13 NOTE — ASSESSMENT & PLAN NOTE
The 10-year ASCVD risk score (Nat HERNANDEZ Jr., et al., 2013) is: 6.5%    Values used to calculate the score:      Age: 63 years      Sex: Female      Is Non- : No      Diabetic: No      Tobacco smoker: No      Systolic Blood Pressure: 142 mmHg      Is BP treated: No      HDL Cholesterol: 46 mg/dL      Total Cholesterol: 257 mg/dL

## 2019-02-13 NOTE — ASSESSMENT & PLAN NOTE
Patient continues to struggle with symptoms of urinary frequency, mild incontinence and UTIs.  She is requesting a standing order for urinalysis with culture if indicated, this is reasonable, standing order for 5 you weights have been ordered.  However I did explain to her if she does have UTI she will likely need to be seen so that we can discuss her symptoms and prescribed medications as appropriate.  I also discussed with her that if she continues to have frequent UTIs she may need to see urology for further evaluation.

## 2019-02-13 NOTE — PROGRESS NOTES
Chief Complaint   Patient presents with   • Hyperlipidemia     fv labs         This is a 63 y.o.female patient that presents today with the following: Follow-up, review labs    Mixed hyperlipidemia  The 10-year ASCVD risk score ([place] HERNANDEZ Jr., et al., 2013) is: 6.5%    Values used to calculate the score:      Age: 63 years      Sex: Female      Is Non- : No      Diabetic: No      Tobacco smoker: No      Systolic Blood Pressure: 142 mmHg      Is BP treated: No      HDL Cholesterol: 46 mg/dL      Total Cholesterol: 257 mg/dL      Urinary frequency  Patient continues to struggle with symptoms of urinary frequency, mild incontinence and UTIs.  She is requesting a standing order for urinalysis with culture if indicated, this is reasonable, standing order for 5 you weights have been ordered.  However I did explain to her if she does have UTI she will likely need to be seen so that we can discuss her symptoms and prescribed medications as appropriate.  I also discussed with her that if she continues to have frequent UTIs she may need to see urology for further evaluation.    Vitamin D deficiency  Patient has history of vitamin D deficiency, this is well controlled with over-the-counter vitamin D supplements.      Hospital Outpatient Visit on 02/08/2019   Component Date Value   • Cholesterol,Tot 02/08/2019 257*   • Triglycerides 02/08/2019 247*   • HDL 02/08/2019 46    • LDL 02/08/2019 162*   • Fasting Status 02/08/2019 Fasting          clinical course has been stable    Past Medical History:   Diagnosis Date   • GERD (gastroesophageal reflux disease)        Past Surgical History:   Procedure Laterality Date   • OTHER      surgery for deviated septum       Family History   Problem Relation Age of Onset   • Hypertension Mother    • Diabetes Father    • Heart Attack Father    • Stroke Father        Codeine    Current Outpatient Prescriptions Ordered in [facility]   Medication Sig Dispense Refill   •  "simvastatin (ZOCOR) 20 MG Tab Take 1 Tab by mouth every evening. 90 Tab 3   • solifenacin (VESICARE) 10 MG tablet Take 1 Tab by mouth every day. 90 Tab 3   • pantoprazole (PROTONIX) 40 MG Tablet Delayed Response TAKE ONE TABLET BY MOUTH ONCE DAILY 90 Tab 0   • vitamin D (CHOLECALCIFEROL) 1000 UNIT Tab Take 1,000 Units by mouth every day.     • vitamin D, Ergocalciferol, (DRISDOL) 79188 units Cap capsule Take 1 Cap by mouth every 7 days. 12 Cap 0     No current Epic-ordered facility-administered medications on file.        Constitutional ROS: No unexpected change in weight, No weakness, No unexplained fevers, sweats, or chills  Pulmonary ROS: No chronic cough, sputum, or hemoptysis, No shortness of breath, No recent change in breathing  Cardiovascular ROS: No chest pain, No edema, No palpitations, Positive for hyperlipidemia  Gastrointestinal ROS: No abdominal pain, No nausea, vomiting, diarrhea, or constipation  Musculoskeletal/Extremities ROS: No clubbing, No peripheral edema, No pain, redness or swelling on the joints  Neurologic ROS: Normal development, No seizures, No weakness   ROS: Positive per HPI    Physical exam:  /88 (BP Location: Right arm, Patient Position: Sitting, BP Cuff Size: Adult)   Pulse 80   Temp 36.7 °C (98.1 °F) (Temporal)   Resp 16   Ht 1.6 m (5' 3\")   Wt 64.4 kg (142 lb)   SpO2 97%   BMI 25.15 kg/m²   General Appearance: Very pleasant middle-aged older female, alert, no distress, very mildly overweight, well-groomed  Skin: Skin color, texture, turgor normal. No rashes or lesions.  Lungs: negative findings: normal respiratory rate and rhythm, lungs clear to auscultation  Heart: negative. RRR without murmur, gallop, or rubs.  No ectopy.  Abdomen: Abdomen soft, non-tender. BS normal. No masses,  No organomegaly  Musculoskeletal: negative findings: no evidence of joint instability, no evidence of muscle atrophy, no deformities present  Neurologic: intact, CN II through XII grossly " intact    Medical decision making/discussion: Patient agrees to start simvastatin and we will recheck labs again in 6 months.  She is also been given a standing order for urinalysis with culture as needed due to frequent UTIs.  She does need refills on Vesicare, this is called in for her.  Discussed the importance of healthy low-fat, low-cholesterol diet, regular physical activity and maintaining healthy weight.  Patient due for mammogram this has been ordered.    Ruth was seen today for hyperlipidemia.    Diagnoses and all orders for this visit:    Mixed hyperlipidemia  -     simvastatin (ZOCOR) 20 MG Tab; Take 1 Tab by mouth every evening.  -     Lipid Profile; Future    Frequent UTI  -     URINALYSIS,CULTURE IF INDICATED; Standing    Urinary frequency  -     URINALYSIS,CULTURE IF INDICATED; Standing  -     solifenacin (VESICARE) 10 MG tablet; Take 1 Tab by mouth every day.    Vitamin D deficiency    Screening for breast cancer  -     MA-SCREEN MAMMO W/CAD-BILAT; Future          Please note that this dictation was created using voice recognition software. I have made every reasonable attempt to correct obvious errors, but I expect that there are errors of grammar and possibly content that I did not discover before finalizing the note.

## 2019-03-05 DIAGNOSIS — K21.9 GASTROESOPHAGEAL REFLUX DISEASE WITHOUT ESOPHAGITIS: ICD-10-CM

## 2019-03-06 RX ORDER — PANTOPRAZOLE SODIUM 40 MG/1
TABLET, DELAYED RELEASE ORAL
Qty: 90 TAB | Refills: 1 | Status: SHIPPED | OUTPATIENT
Start: 2019-03-06 | End: 2019-08-13 | Stop reason: SDUPTHER

## 2019-03-12 ENCOUNTER — HOSPITAL ENCOUNTER (OUTPATIENT)
Facility: MEDICAL CENTER | Age: 64
End: 2019-03-12
Attending: NURSE PRACTITIONER
Payer: COMMERCIAL

## 2019-03-12 DIAGNOSIS — N39.0 FREQUENT UTI: ICD-10-CM

## 2019-03-12 DIAGNOSIS — R35.0 URINARY FREQUENCY: ICD-10-CM

## 2019-03-12 LAB
APPEARANCE UR: CLEAR
BACTERIA #/AREA URNS HPF: ABNORMAL /HPF
BILIRUB UR QL STRIP.AUTO: NEGATIVE
CAOX CRY #/AREA URNS HPF: ABNORMAL /HPF
COLOR UR: YELLOW
EPI CELLS #/AREA URNS HPF: NEGATIVE /HPF
GLUCOSE UR STRIP.AUTO-MCNC: NEGATIVE MG/DL
HYALINE CASTS #/AREA URNS LPF: ABNORMAL /LPF
KETONES UR STRIP.AUTO-MCNC: NEGATIVE MG/DL
LEUKOCYTE ESTERASE UR QL STRIP.AUTO: NEGATIVE
MICRO URNS: ABNORMAL
NITRITE UR QL STRIP.AUTO: POSITIVE
PH UR STRIP.AUTO: 6 [PH]
PROT UR QL STRIP: NEGATIVE MG/DL
RBC # URNS HPF: ABNORMAL /HPF
RBC UR QL AUTO: ABNORMAL
SP GR UR STRIP.AUTO: 1.02
UROBILINOGEN UR STRIP.AUTO-MCNC: 0.2 MG/DL
WBC #/AREA URNS HPF: ABNORMAL /HPF

## 2019-03-12 PROCEDURE — 81001 URINALYSIS AUTO W/SCOPE: CPT

## 2019-03-15 ENCOUNTER — TELEPHONE (OUTPATIENT)
Dept: MEDICAL GROUP | Facility: PHYSICIAN GROUP | Age: 64
End: 2019-03-15

## 2019-03-15 ENCOUNTER — PATIENT MESSAGE (OUTPATIENT)
Dept: MEDICAL GROUP | Facility: PHYSICIAN GROUP | Age: 64
End: 2019-03-15

## 2019-03-15 DIAGNOSIS — R30.0 DYSURIA: ICD-10-CM

## 2019-03-15 RX ORDER — SULFAMETHOXAZOLE AND TRIMETHOPRIM 800; 160 MG/1; MG/1
1 TABLET ORAL 2 TIMES DAILY
Qty: 14 TAB | Refills: 0 | Status: SHIPPED | OUTPATIENT
Start: 2019-03-15 | End: 2019-08-20

## 2019-03-27 ENCOUNTER — HOSPITAL ENCOUNTER (OUTPATIENT)
Dept: LAB | Facility: MEDICAL CENTER | Age: 64
End: 2019-03-27
Attending: FAMILY MEDICINE
Payer: COMMERCIAL

## 2019-03-27 ENCOUNTER — OFFICE VISIT (OUTPATIENT)
Dept: URGENT CARE | Facility: PHYSICIAN GROUP | Age: 64
End: 2019-03-27
Payer: COMMERCIAL

## 2019-03-27 VITALS
BODY MASS INDEX: 25.86 KG/M2 | OXYGEN SATURATION: 98 % | DIASTOLIC BLOOD PRESSURE: 68 MMHG | SYSTOLIC BLOOD PRESSURE: 124 MMHG | HEART RATE: 92 BPM | TEMPERATURE: 99.6 F | WEIGHT: 146 LBS | RESPIRATION RATE: 18 BRPM

## 2019-03-27 DIAGNOSIS — R10.2 SUPRAPUBIC PAIN: ICD-10-CM

## 2019-03-27 LAB
ALBUMIN SERPL BCP-MCNC: 4.7 G/DL (ref 3.2–4.9)
ALBUMIN/GLOB SERPL: 1.3 G/DL
ALP SERPL-CCNC: 72 U/L (ref 30–99)
ALT SERPL-CCNC: 30 U/L (ref 2–50)
ANION GAP SERPL CALC-SCNC: 10 MMOL/L (ref 0–11.9)
APPEARANCE UR: CLEAR
AST SERPL-CCNC: 24 U/L (ref 12–45)
BASOPHILS # BLD AUTO: 0.4 % (ref 0–1.8)
BASOPHILS # BLD: 0.03 K/UL (ref 0–0.12)
BILIRUB SERPL-MCNC: 0.6 MG/DL (ref 0.1–1.5)
BILIRUB UR STRIP-MCNC: NORMAL MG/DL
BUN SERPL-MCNC: 17 MG/DL (ref 8–22)
CALCIUM SERPL-MCNC: 9.7 MG/DL (ref 8.5–10.5)
CHLORIDE SERPL-SCNC: 104 MMOL/L (ref 96–112)
CO2 SERPL-SCNC: 26 MMOL/L (ref 20–33)
COLOR UR AUTO: YELLOW
CREAT SERPL-MCNC: 0.82 MG/DL (ref 0.5–1.4)
EOSINOPHIL # BLD AUTO: 0.12 K/UL (ref 0–0.51)
EOSINOPHIL NFR BLD: 1.8 % (ref 0–6.9)
ERYTHROCYTE [DISTWIDTH] IN BLOOD BY AUTOMATED COUNT: 43 FL (ref 35.9–50)
GLOBULIN SER CALC-MCNC: 3.5 G/DL (ref 1.9–3.5)
GLUCOSE SERPL-MCNC: 99 MG/DL (ref 65–99)
GLUCOSE UR STRIP.AUTO-MCNC: NORMAL MG/DL
HCT VFR BLD AUTO: 45.8 % (ref 37–47)
HGB BLD-MCNC: 14.7 G/DL (ref 12–16)
IMM GRANULOCYTES # BLD AUTO: 0.01 K/UL (ref 0–0.11)
IMM GRANULOCYTES NFR BLD AUTO: 0.1 % (ref 0–0.9)
KETONES UR STRIP.AUTO-MCNC: NORMAL MG/DL
LEUKOCYTE ESTERASE UR QL STRIP.AUTO: NORMAL
LYMPHOCYTES # BLD AUTO: 2.39 K/UL (ref 1–4.8)
LYMPHOCYTES NFR BLD: 35.1 % (ref 22–41)
MCH RBC QN AUTO: 29.8 PG (ref 27–33)
MCHC RBC AUTO-ENTMCNC: 32.1 G/DL (ref 33.6–35)
MCV RBC AUTO: 92.7 FL (ref 81.4–97.8)
MONOCYTES # BLD AUTO: 0.42 K/UL (ref 0–0.85)
MONOCYTES NFR BLD AUTO: 6.2 % (ref 0–13.4)
NEUTROPHILS # BLD AUTO: 3.83 K/UL (ref 2–7.15)
NEUTROPHILS NFR BLD: 56.4 % (ref 44–72)
NITRITE UR QL STRIP.AUTO: NORMAL
NRBC # BLD AUTO: 0 K/UL
NRBC BLD-RTO: 0 /100 WBC
PH UR STRIP.AUTO: 5 [PH] (ref 5–8)
PLATELET # BLD AUTO: 220 K/UL (ref 164–446)
PMV BLD AUTO: 12.5 FL (ref 9–12.9)
POTASSIUM SERPL-SCNC: 4 MMOL/L (ref 3.6–5.5)
PROT SERPL-MCNC: 8.2 G/DL (ref 6–8.2)
PROT UR QL STRIP: NORMAL MG/DL
RBC # BLD AUTO: 4.94 M/UL (ref 4.2–5.4)
RBC UR QL AUTO: NORMAL
SODIUM SERPL-SCNC: 140 MMOL/L (ref 135–145)
SP GR UR STRIP.AUTO: 1.03
UROBILINOGEN UR STRIP-MCNC: 0.2 MG/DL
WBC # BLD AUTO: 6.8 K/UL (ref 4.8–10.8)

## 2019-03-27 PROCEDURE — 80053 COMPREHEN METABOLIC PANEL: CPT

## 2019-03-27 PROCEDURE — 87086 URINE CULTURE/COLONY COUNT: CPT

## 2019-03-27 PROCEDURE — 36415 COLL VENOUS BLD VENIPUNCTURE: CPT

## 2019-03-27 PROCEDURE — 85025 COMPLETE CBC W/AUTO DIFF WBC: CPT

## 2019-03-27 PROCEDURE — 81002 URINALYSIS NONAUTO W/O SCOPE: CPT | Performed by: FAMILY MEDICINE

## 2019-03-27 PROCEDURE — 99214 OFFICE O/P EST MOD 30 MIN: CPT | Performed by: FAMILY MEDICINE

## 2019-03-29 ENCOUNTER — TELEPHONE (OUTPATIENT)
Dept: URGENT CARE | Facility: PHYSICIAN GROUP | Age: 64
End: 2019-03-29

## 2019-03-29 LAB
BACTERIA UR CULT: NORMAL
SIGNIFICANT IND 70042: NORMAL
SITE SITE: NORMAL
SOURCE SOURCE: NORMAL

## 2019-03-29 NOTE — TELEPHONE ENCOUNTER
Spoke w/pt on phone        States that her pain has resolved.         Advised to f/u in urgent care if pain returns.

## 2019-03-29 NOTE — PROGRESS NOTES
Chief Complaint   Patient presents with   • Abdominal Pain     x 3 weeks                         Abdominal Pain  This is a new problem. The current episode started 3 wks ago. The onset quality is sudden. The problem occurs intermittent. The pain is unchanged. The pain is located in the periumbilical region. The pain is mild. The quality of the pain is described as aching. The pain does not radiate. Associated symptoms include : subjective fever. Pertinent negatives include no belching, constipation, diarrhea, dysuria, fever, hematochezia, hematuria, nausea or sore throat. Nothing relieves the symptoms. Past treatments include nothing.     Social History   Substance Use Topics   • Smoking status: Former Smoker   • Smokeless tobacco: Never Used      Comment: quite 3 years ago   • Alcohol use No           Current Outpatient Prescriptions on File Prior to Visit   Medication Sig Dispense Refill   • pantoprazole (PROTONIX) 40 MG Tablet Delayed Response TAKE 1 TABLET BY MOUTH ONCE DAILY 90 Tab 1   • simvastatin (ZOCOR) 20 MG Tab Take 1 Tab by mouth every evening. 90 Tab 3   • vitamin D (CHOLECALCIFEROL) 1000 UNIT Tab Take 1,000 Units by mouth every day.     • sulfamethoxazole-trimethoprim (BACTRIM DS) 800-160 MG tablet Take 1 Tab by mouth 2 times a day. (Patient not taking: Reported on 3/27/2019) 14 Tab 0   • solifenacin (VESICARE) 10 MG tablet Take 1 Tab by mouth every day. (Patient not taking: Reported on 3/27/2019) 90 Tab 3   • vitamin D, Ergocalciferol, (DRISDOL) 76297 units Cap capsule Take 1 Cap by mouth every 7 days. (Patient not taking: Reported on 3/27/2019) 12 Cap 0     No current facility-administered medications on file prior to visit.        Family History   Problem Relation Age of Onset   • Hypertension Mother    • Diabetes Father    • Heart Attack Father    • Stroke Father          Allergies   Allergen Reactions   • Codeine Nausea         Review of Systems   Constitutional: + subj fever  HENT: Negative for  sore throat.    Gastrointestinal: Positive for abdominal pain. Negative for nausea, diarrhea, constipation and hematochezia.   Genitourinary: Negative for dysuria and hematuria.   Neurological: denies dizziness, confusion, disorientation.   No extremity weakness or numbness  All other systems reviewed and are negative.         Objective:     Blood pressure 124/68, pulse 92, temperature 37.6 °C (99.6 °F), temperature source Temporal, resp. rate 18, weight 66.2 kg (146 lb), SpO2 98 %, not currently breastfeeding.      Physical Exam   Constitutional: pt appears well-developed. No distress.   HENT:   Nose: No nasal discharge.   Mouth/Throat: Mucous membranes are moist. Oropharynx is clear.   Eyes: Conjunctivae and EOM are normal. Pupils are equal, round, and reactive to light. Right eye exhibits no discharge. Left eye exhibits no discharge.   Neck: Neck supple.   Cardiovascular: Normal rate, regular rhythm, S1 normal and S2 normal.    Pulmonary/Chest: Effort normal and breath sounds normal. There is normal air entry. No respiratory distress.   Abdominal: Soft. There is tenderness in the periumbilical and suprapubic area. bowel sounds are present.   No liver or spleen enlargement .  No rebound and no guarding.   There is no pain over McBurney's point  Lymphadenopathy:     Pt has no  adenopathy.   Neurological: pt is alert and orientated x3 . No cranial nerve deficit.   Skin: Skin is warm and moist. No petechiae and no rash noted.   not diaphoretic. No jaundice.   Nursing note and vitals reviewed.              Hospital Outpatient Visit on 03/27/2019   Component Date Value Ref Range Status   • Significant Indicator 03/27/2019 NEG   Preliminary   • Source 03/27/2019 UR   Preliminary   • Site 03/27/2019 -   Preliminary   • Urine Culture 03/27/2019 Culture in progress.   Preliminary   • Sodium 03/27/2019 140  135 - 145 mmol/L Final   • Potassium 03/27/2019 4.0  3.6 - 5.5 mmol/L Final   • Chloride 03/27/2019 104  96 - 112  mmol/L Final   • Co2 03/27/2019 26  20 - 33 mmol/L Final   • Anion Gap 03/27/2019 10.0  0.0 - 11.9 Final   • Glucose 03/27/2019 99  65 - 99 mg/dL Final   • Bun 03/27/2019 17  8 - 22 mg/dL Final   • Creatinine 03/27/2019 0.82  0.50 - 1.40 mg/dL Final   • Calcium 03/27/2019 9.7  8.5 - 10.5 mg/dL Final   • AST(SGOT) 03/27/2019 24  12 - 45 U/L Final   • ALT(SGPT) 03/27/2019 30  2 - 50 U/L Final   • Alkaline Phosphatase 03/27/2019 72  30 - 99 U/L Final   • Total Bilirubin 03/27/2019 0.6  0.1 - 1.5 mg/dL Final   • Albumin 03/27/2019 4.7  3.2 - 4.9 g/dL Final   • Total Protein 03/27/2019 8.2  6.0 - 8.2 g/dL Final   • Globulin 03/27/2019 3.5  1.9 - 3.5 g/dL Final   • A-G Ratio 03/27/2019 1.3  g/dL Final   • WBC 03/27/2019 6.8  4.8 - 10.8 K/uL Final   • RBC 03/27/2019 4.94  4.20 - 5.40 M/uL Final   • Hemoglobin 03/27/2019 14.7  12.0 - 16.0 g/dL Final   • Hematocrit 03/27/2019 45.8  37.0 - 47.0 % Final   • MCV 03/27/2019 92.7  81.4 - 97.8 fL Final   • MCH 03/27/2019 29.8  27.0 - 33.0 pg Final   • MCHC 03/27/2019 32.1* 33.6 - 35.0 g/dL Final   • RDW 03/27/2019 43.0  35.9 - 50.0 fL Final   • Platelet Count 03/27/2019 220  164 - 446 K/uL Final   • MPV 03/27/2019 12.5  9.0 - 12.9 fL Final   • Neutrophils-Polys 03/27/2019 56.40  44.00 - 72.00 % Final   • Lymphocytes 03/27/2019 35.10  22.00 - 41.00 % Final   • Monocytes 03/27/2019 6.20  0.00 - 13.40 % Final   • Eosinophils 03/27/2019 1.80  0.00 - 6.90 % Final   • Basophils 03/27/2019 0.40  0.00 - 1.80 % Final   • Immature Granulocytes 03/27/2019 0.10  0.00 - 0.90 % Final   • Nucleated RBC 03/27/2019 0.00  /100 WBC Final   • Neutrophils (Absolute) 03/27/2019 3.83  2.00 - 7.15 K/uL Final    Includes immature neutrophils, if present.   • Lymphs (Absolute) 03/27/2019 2.39  1.00 - 4.80 K/uL Final   • Monos (Absolute) 03/27/2019 0.42  0.00 - 0.85 K/uL Final   • Eos (Absolute) 03/27/2019 0.12  0.00 - 0.51 K/uL Final   • Baso (Absolute) 03/27/2019 0.03  0.00 - 0.12 K/uL Final   • Immature  Granulocytes (abs) 03/27/2019 0.01  0.00 - 0.11 K/uL Final   • NRBC (Absolute) 03/27/2019 0.00  K/uL Final   • GFR If  03/27/2019 >60  >60 mL/min/1.73 m 2 Final   • GFR If Non  03/27/2019 >60  >60 mL/min/1.73 m 2 Final   Office Visit on 03/27/2019   Component Date Value Ref Range Status   • POC Color 03/27/2019 yellow  Negative Final   • POC Appearance 03/27/2019 clear  Negative Final   • POC Leukocyte Esterase 03/27/2019 neg  Negative Final   • POC Nitrites 03/27/2019 neg  Negative Final   • POC Urobiligen 03/27/2019 0.2  Negative (0.2) mg/dL Final   • POC Protein 03/27/2019 neg  Negative mg/dL Final   • POC Urine PH 03/27/2019 5.0  5.0 - 8.0 Final   • POC Blood 03/27/2019 neg  Negative Final   • POC Specific Gravity 03/27/2019 1.030  <1.005 - >1.030 Final   • POC Ketones 03/27/2019 neg  Negative mg/dL Final   • POC Bilirubin 03/27/2019 neg  Negative mg/dL Final   • POC Glucose 03/27/2019 neg  Negative mg/dL Final         Assessment/Plan:          1. Suprapubic pain  UA unremarkable.   Labs reviewed - completely unremarkable.     CT abd, pelvis ordered for am

## 2019-08-08 ENCOUNTER — HOSPITAL ENCOUNTER (OUTPATIENT)
Dept: LAB | Facility: MEDICAL CENTER | Age: 64
End: 2019-08-08
Attending: NURSE PRACTITIONER
Payer: COMMERCIAL

## 2019-08-08 DIAGNOSIS — R35.0 URINARY FREQUENCY: ICD-10-CM

## 2019-08-08 DIAGNOSIS — E78.2 MIXED HYPERLIPIDEMIA: ICD-10-CM

## 2019-08-08 DIAGNOSIS — N39.0 FREQUENT UTI: ICD-10-CM

## 2019-08-08 LAB
APPEARANCE UR: CLEAR
BACTERIA #/AREA URNS HPF: ABNORMAL /HPF
BILIRUB UR QL STRIP.AUTO: NEGATIVE
CHOLEST SERPL-MCNC: 137 MG/DL (ref 100–199)
COLOR UR: YELLOW
EPI CELLS #/AREA URNS HPF: NEGATIVE /HPF
GLUCOSE UR STRIP.AUTO-MCNC: NEGATIVE MG/DL
HDLC SERPL-MCNC: 46 MG/DL
HYALINE CASTS #/AREA URNS LPF: ABNORMAL /LPF
KETONES UR STRIP.AUTO-MCNC: NEGATIVE MG/DL
LDLC SERPL CALC-MCNC: 60 MG/DL
LEUKOCYTE ESTERASE UR QL STRIP.AUTO: NEGATIVE
MICRO URNS: ABNORMAL
NITRITE UR QL STRIP.AUTO: POSITIVE
PH UR STRIP.AUTO: 6.5 [PH] (ref 5–8)
PROT UR QL STRIP: NEGATIVE MG/DL
RBC # URNS HPF: ABNORMAL /HPF
RBC UR QL AUTO: NEGATIVE
SP GR UR STRIP.AUTO: 1.02
TRIGL SERPL-MCNC: 156 MG/DL (ref 0–149)
UROBILINOGEN UR STRIP.AUTO-MCNC: 0.2 MG/DL
WBC #/AREA URNS HPF: ABNORMAL /HPF

## 2019-08-08 PROCEDURE — 81001 URINALYSIS AUTO W/SCOPE: CPT

## 2019-08-08 PROCEDURE — 36415 COLL VENOUS BLD VENIPUNCTURE: CPT

## 2019-08-08 PROCEDURE — 80061 LIPID PANEL: CPT

## 2019-08-13 DIAGNOSIS — K21.9 GASTROESOPHAGEAL REFLUX DISEASE WITHOUT ESOPHAGITIS: ICD-10-CM

## 2019-08-14 ENCOUNTER — OFFICE VISIT (OUTPATIENT)
Dept: MEDICAL GROUP | Facility: PHYSICIAN GROUP | Age: 64
End: 2019-08-14
Payer: COMMERCIAL

## 2019-08-14 VITALS
SYSTOLIC BLOOD PRESSURE: 118 MMHG | WEIGHT: 142 LBS | HEART RATE: 89 BPM | RESPIRATION RATE: 12 BRPM | BODY MASS INDEX: 25.16 KG/M2 | DIASTOLIC BLOOD PRESSURE: 78 MMHG | OXYGEN SATURATION: 98 % | TEMPERATURE: 98.4 F | HEIGHT: 63 IN

## 2019-08-14 DIAGNOSIS — R10.84 GENERALIZED ABDOMINAL PAIN: ICD-10-CM

## 2019-08-14 DIAGNOSIS — K21.9 GASTROESOPHAGEAL REFLUX DISEASE WITHOUT ESOPHAGITIS: ICD-10-CM

## 2019-08-14 DIAGNOSIS — N39.0 FREQUENT UTI: ICD-10-CM

## 2019-08-14 DIAGNOSIS — N95.2 VAGINAL ATROPHY: ICD-10-CM

## 2019-08-14 PROCEDURE — 99214 OFFICE O/P EST MOD 30 MIN: CPT | Performed by: NURSE PRACTITIONER

## 2019-08-14 RX ORDER — NITROFURANTOIN 25; 75 MG/1; MG/1
100 CAPSULE ORAL 2 TIMES DAILY
Qty: 14 CAP | Refills: 0 | Status: SHIPPED | OUTPATIENT
Start: 2019-08-14 | End: 2019-08-20 | Stop reason: SINTOL

## 2019-08-14 RX ORDER — ESTRADIOL 0.1 MG/G
1 CREAM VAGINAL DAILY
Qty: 42.5 G | Refills: 1 | Status: SHIPPED | OUTPATIENT
Start: 2019-08-14 | End: 2019-10-15 | Stop reason: SDUPTHER

## 2019-08-14 RX ORDER — RANITIDINE 150 MG/1
150 TABLET ORAL 2 TIMES DAILY
Qty: 180 TAB | Refills: 3 | Status: SHIPPED | OUTPATIENT
Start: 2019-08-14 | End: 2019-11-25

## 2019-08-14 RX ORDER — PANTOPRAZOLE SODIUM 40 MG/1
TABLET, DELAYED RELEASE ORAL
Qty: 90 TAB | Refills: 3 | Status: SHIPPED | OUTPATIENT
Start: 2019-08-14 | End: 2020-07-18 | Stop reason: SDUPTHER

## 2019-08-14 ASSESSMENT — PAIN SCALES - GENERAL: PAINLEVEL: NO PAIN

## 2019-08-14 NOTE — TELEPHONE ENCOUNTER
Was the patient seen in the last year in this department? Yes    Does patient have an active prescription for medications requested? No     Received Request Via: Patient      Pt met protocol?: Yes, OV today (8/14), out next next month

## 2019-08-14 NOTE — PROGRESS NOTES
Chief Complaint   Patient presents with   • Labs Only   • GI Problem   • Other     has referral for [facility]/mammogram         This is a 63 y.o.female patient that presents today with the following:Follow-up visit, discuss acute and chronic conditions    Gastroesophageal reflux disease without esophagitis  This is a chronic condition, stable and usually well controlled on PPI but she is finding that she is having breakthrough reflux, she states she is good about avoiding aggravating foods and activities.  She has been seen by gastroenterology she had both EGD as well as colonoscopy, both of these were well within normal limits.  We will add H2 blocker including ranitidine 150 mg twice a day in addition to her PPI.  I did discuss with her that if symptoms do not improve she may need reevaluation by gastroenterology.    Frequent UTI  Patient continues to suffer from frequent UTIs.  Her most recent urinalysis does show signs of UTI and we will go ahead and treat empirically.  At 1 of her last visits we did discuss that if she continues to have symptoms of UTI she would need to be referred to urology for further evaluation as she might need to have a scoping of her bladder.  However she does admit to me she is suffering from symptoms of vaginal atrophy and itching and we both discussed that this could possibly be related to menopausal symptoms.  She does agree to trying vaginal estrogen, this is been called in for her.  She does understand the risks associated with hormone replacement therapy, she was advised to get her mammogram scheduled.  She is going to send me a message in about a month to let me know how her symptoms are if they are not improved we will go ahead and put a referral into urology for further evaluation.    Generalized abdominal pain  Patient has recently been seen in urgent care as well as [facility] a number of times for generalized abdominal pain worse in the right lower quadrant.  She has had imaging  to include ultrasound as well as CT scan.  Initially this is not to be appendicitis but appendix was normal-appearing on ultrasound that she was diagnosed with colitis.  She was given prescription for IV antibiotics and analgesics and states her symptoms have improved and she no longer has the lower quadrant pain.  We will continue to monitor this and she is to return for follow-up if any of her symptoms return or worsen.      Hospital Outpatient Visit on 08/08/2019   Component Date Value   • Color 08/08/2019 Yellow    • Character 08/08/2019 Clear    • Specific Gravity 08/08/2019 1.022    • Ph 08/08/2019 6.5    • Glucose 08/08/2019 Negative    • Ketones 08/08/2019 Negative    • Protein 08/08/2019 Negative    • Bilirubin 08/08/2019 Negative    • Urobilinogen, Urine 08/08/2019 0.2    • Nitrite 08/08/2019 Positive*   • Leukocyte Esterase 08/08/2019 Negative    • Occult Blood 08/08/2019 Negative    • Micro Urine Req 08/08/2019 Microscopic    • Cholesterol,Tot 08/08/2019 137    • Triglycerides 08/08/2019 156*   • HDL 08/08/2019 46    • LDL 08/08/2019 60    • WBC 08/08/2019 2-5    • RBC 08/08/2019 0-2    • Bacteria 08/08/2019 Many*   • Epithelial Cells 08/08/2019 Negative    • Hyaline Cast 08/08/2019 0-2          clinical course has been stable    Past Medical History:   Diagnosis Date   • GERD (gastroesophageal reflux disease)        Past Surgical History:   Procedure Laterality Date   • OTHER      surgery for deviated septum       Family History   Problem Relation Age of Onset   • Hypertension Mother    • Diabetes Father    • Heart Attack Father    • Stroke Father        Codeine    Current Outpatient Medications Ordered in Epic   Medication Sig Dispense Refill   • estradiol (ESTRACE) 0.1 MG/GM vaginal cream Insert 1 g in vagina every day. 42.5 g 1   • nitrofurantoin monohyd macro (MACROBID) 100 MG Cap Take 1 Cap by mouth 2 times a day. 14 Cap 0   • raNITidine (ZANTAC) 150 MG Tab Take 1 Tab by mouth 2 times a day. 180  "Tab 3   • dicyclomine (BENTYL) 10 MG Cap Take 1 Cap by mouth 4 Times a Day,Before Meals and at Bedtime. 60 Cap 2   • simvastatin (ZOCOR) 20 MG Tab Take 1 Tab by mouth every evening. 90 Tab 3   • solifenacin (VESICARE) 10 MG tablet Take 1 Tab by mouth every day. 90 Tab 3   • vitamin D (CHOLECALCIFEROL) 1000 UNIT Tab Take 1,000 Units by mouth every day.     • pantoprazole (PROTONIX) 40 MG Tablet Delayed Response TAKE 1 TABLET BY MOUTH ONCE DAILY 90 Tab 3   • sulfamethoxazole-trimethoprim (BACTRIM DS) 800-160 MG tablet Take 1 Tab by mouth 2 times a day. (Patient not taking: Reported on 3/27/2019) 14 Tab 0   • vitamin D, Ergocalciferol, (DRISDOL) 85878 units Cap capsule Take 1 Cap by mouth every 7 days. (Patient not taking: Reported on 3/27/2019) 12 Cap 0     No current Epic-ordered facility-administered medications on file.        Constitutional ROS: No unexpected change in weight, No weakness, No unexplained fevers, sweats, or chills  Pulmonary ROS: No chronic cough, sputum, or hemoptysis, No shortness of breath, No recent change in breathing  Cardiovascular ROS: No chest pain, No edema, No palpitations, Positive for hyperlipidemia  Gastrointestinal ROS: No abdominal pain, No nausea, vomiting, diarrhea, or constipation.  Positive for reflux  Musculoskeletal/Extremities ROS: No clubbing, No peripheral edema, No pain, redness or swelling on the joints  Neurologic ROS: Normal development, No seizures, No weakness   ROS: Positive per HPI    Physical exam:  /78 (BP Location: Left arm, Patient Position: Sitting, BP Cuff Size: Adult)   Pulse 89   Temp 36.9 °C (98.4 °F) (Temporal)   Resp 12   Ht 1.6 m (5' 3\")   Wt 64.4 kg (142 lb)   SpO2 98%   Breastfeeding? No   BMI 25.15 kg/m²   General Appearance: Very pleasant middle-aged older female, alert, no distress, well-nourished, well-groomed  Skin: Skin color, texture, turgor normal. No rashes or lesions.  Lungs: negative findings: normal respiratory rate and " rhythm, normal effort  Abdomen: Abdomen soft, non-tender. BS normal. No masses,  No organomegaly  Musculoskeletal: negative findings: no evidence of joint instability, no evidence of muscle atrophy, no deformities present  Neurologic: intact, CN II through XII grossly intact    Medical decision making/discussion: We will go ahead and treat empirically for symptoms very consistent with UTI, at this time we are going to treat her for 7 days instead of 5.  She will be started on ranitidine 150 mg twice daily in addition to PPI for persistent GERD.  She is also going to start estradiol cream daily to see if this will help with her frequent UTI and perineal itching.  She is going to send me an email in 3 to 4 weeks let me know how she is doing on the cream.  She will be due for follow-up in 3 months, she can be seen sooner if needed.  We will attempt to get outside records from [facility].    Ruth was seen today for labs only, gi problem and other.    Diagnoses and all orders for this visit:    Frequent UTI  -     nitrofurantoin monohyd macro (MACROBID) 100 MG Cap; Take 1 Cap by mouth 2 times a day.    Gastroesophageal reflux disease without esophagitis  -     raNITidine (ZANTAC) 150 MG Tab; Take 1 Tab by mouth 2 times a day.    Vaginal atrophy  -     estradiol (ESTRACE) 0.1 MG/GM vaginal cream; Insert 1 g in vagina every day.    Generalized abdominal pain    Other orders  -     Obtain Results: Other (see comment); Obtain Results From: Other (see comment)        Return in about 3 months (around 11/14/2019) for Follow-up.        Please note that this dictation was created using voice recognition software. I have made every reasonable attempt to correct obvious errors, but I expect that there are errors of grammar and possibly content that I did not discover before finalizing the note.

## 2019-08-15 PROBLEM — N95.2 VAGINAL ATROPHY: Status: ACTIVE | Noted: 2019-08-15

## 2019-08-15 NOTE — ASSESSMENT & PLAN NOTE
Patient has recently been seen in urgent care as well as [facility] a number of times for generalized abdominal pain worse in the right lower quadrant.  She has had imaging to include ultrasound as well as CT scan.  Initially this is not to be appendicitis but appendix was normal-appearing on ultrasound that she was diagnosed with colitis.  She was given prescription for IV antibiotics and analgesics and states her symptoms have improved and she no longer has the lower quadrant pain.  We will continue to monitor this and she is to return for follow-up if any of her symptoms return or worsen.

## 2019-08-15 NOTE — ASSESSMENT & PLAN NOTE
This is a chronic condition, stable and usually well controlled on PPI but she is finding that she is having breakthrough reflux, she states she is good about avoiding aggravating foods and activities.  She has been seen by gastroenterology she had both EGD as well as colonoscopy, both of these were well within normal limits.  We will add H2 blocker including ranitidine 150 mg twice a day in addition to her PPI.  I did discuss with her that if symptoms do not improve she may need reevaluation by gastroenterology.

## 2019-08-15 NOTE — ASSESSMENT & PLAN NOTE
Patient continues to suffer from frequent UTIs.  Her most recent urinalysis does show signs of UTI and we will go ahead and treat empirically.  At 1 of her last visits we did discuss that if she continues to have symptoms of UTI she would need to be referred to urology for further evaluation as she might need to have a scoping of her bladder.  However she does admit to me she is suffering from symptoms of vaginal atrophy and itching and we both discussed that this could possibly be related to menopausal symptoms.  She does agree to trying vaginal estrogen, this is been called in for her.  She does understand the risks associated with hormone replacement therapy, she was advised to get her mammogram scheduled.  She is going to send me a message in about a month to let me know how her symptoms are if they are not improved we will go ahead and put a referral into urology for further evaluation.

## 2019-08-20 RX ORDER — SULFAMETHOXAZOLE AND TRIMETHOPRIM 800; 160 MG/1; MG/1
1 TABLET ORAL 2 TIMES DAILY
Qty: 14 TAB | Refills: 0 | Status: SHIPPED | OUTPATIENT
Start: 2019-08-20 | End: 2019-11-25

## 2019-08-28 ENCOUNTER — TELEPHONE (OUTPATIENT)
Dept: MEDICAL GROUP | Facility: PHYSICIAN GROUP | Age: 64
End: 2019-08-28

## 2019-08-29 NOTE — TELEPHONE ENCOUNTER
MEDICATION PRIOR AUTHORIZATION:    1. Name of Medication: estrace vag 0.1mg/gm cream    2. Requested By (Name of Pharmacy): [facility]     3. Is insurance on file current? yes    4. What is the name & phone number of the 3rd party payor?   [employer]  Member id: [id number]  Group: [id number]  Ins contact: [phone]

## 2019-10-15 DIAGNOSIS — N95.2 VAGINAL ATROPHY: ICD-10-CM

## 2019-10-15 DIAGNOSIS — R10.13 EPIGASTRIC PAIN: ICD-10-CM

## 2019-10-15 DIAGNOSIS — K21.9 GASTROESOPHAGEAL REFLUX DISEASE WITHOUT ESOPHAGITIS: ICD-10-CM

## 2019-10-16 ENCOUNTER — TELEPHONE (OUTPATIENT)
Dept: MEDICAL GROUP | Facility: PHYSICIAN GROUP | Age: 64
End: 2019-10-16

## 2019-10-16 NOTE — TELEPHONE ENCOUNTER
----- Message from ZENON Rice sent at 10/15/2019  3:52 PM PDT -----  Regarding: FW: Prescription Question  Contact: [phone]  Please fax mammo ordered in Feb to [facility]  Thank you  ----- Message -----  From: Mindi Canales, Med Ass't  Sent: 10/15/2019   2:24 PM PDT  To: ZENON Rice  Subject: FW: Prescription Question                            ----- Message -----  From: Ruth Shoemaker  Sent: 10/15/2019   2:21 PM PDT  To: Rosette Yeung  Subject: Prescription Question                            The Estradiol and  the Ranitidine seems to be working very well.    Thanks so much  Ruth     [address] [facility] EMERGENCY DEPT [address] [address] 
[phone] Work/School Note Date: 2/10/2018 To Whom It May concern: 
 
Jailyn Coppola was seen and treated today in the emergency room by the following provider(s): 
Attending Provider: Pito Contreras MD 
Physician Assistant: ROBIN Bellamy. Jailyn Coppola may return to work on 02/13/18. Sincerely, ROBIN Bellamy

## 2019-10-17 RX ORDER — DICYCLOMINE HYDROCHLORIDE 10 MG/1
CAPSULE ORAL
Qty: 60 CAP | Refills: 2 | Status: SHIPPED | OUTPATIENT
Start: 2019-10-17 | End: 2019-12-31

## 2019-10-17 RX ORDER — ESTRADIOL 0.1 MG/G
CREAM VAGINAL
Qty: 43 G | Refills: 2 | Status: SHIPPED | OUTPATIENT
Start: 2019-10-17 | End: 2020-01-27

## 2019-10-17 NOTE — TELEPHONE ENCOUNTER
Was the patient seen in the last year in this department? Yes    Does patient have an active prescription for medications requested? No     Received Request Via: Pharmacy      Pt met protocol?: Yes    LAST OV 08/14/2019

## 2019-11-15 ENCOUNTER — TELEPHONE (OUTPATIENT)
Dept: MEDICAL GROUP | Facility: PHYSICIAN GROUP | Age: 64
End: 2019-11-15

## 2019-11-15 NOTE — TELEPHONE ENCOUNTER
I have called and initiated  a PA for patients Vesicare via phone [phone].    We should receive a fax within 48 hours

## 2019-11-20 NOTE — TELEPHONE ENCOUNTER
FINAL PRIOR AUTHORIZATION STATUS:    1.  Name of Medication & Dose: Vesicare     2. Prior Auth Status: Approved through 11/16/2020     3. Action Taken: Pharmacy Notified: yes Patient Notified: no

## 2019-11-21 DIAGNOSIS — Z12.39 SCREENING FOR BREAST CANCER: ICD-10-CM

## 2019-11-25 ENCOUNTER — OFFICE VISIT (OUTPATIENT)
Dept: MEDICAL GROUP | Facility: PHYSICIAN GROUP | Age: 64
End: 2019-11-25
Payer: COMMERCIAL

## 2019-11-25 VITALS
OXYGEN SATURATION: 99 % | SYSTOLIC BLOOD PRESSURE: 126 MMHG | TEMPERATURE: 97.7 F | BODY MASS INDEX: 25.69 KG/M2 | RESPIRATION RATE: 16 BRPM | DIASTOLIC BLOOD PRESSURE: 84 MMHG | HEART RATE: 81 BPM | WEIGHT: 145 LBS | HEIGHT: 63 IN

## 2019-11-25 DIAGNOSIS — Z11.59 NEED FOR HEPATITIS C SCREENING TEST: ICD-10-CM

## 2019-11-25 DIAGNOSIS — K21.9 GASTROESOPHAGEAL REFLUX DISEASE WITHOUT ESOPHAGITIS: ICD-10-CM

## 2019-11-25 DIAGNOSIS — E78.2 MIXED HYPERLIPIDEMIA: ICD-10-CM

## 2019-11-25 DIAGNOSIS — R35.0 URINARY FREQUENCY: ICD-10-CM

## 2019-11-25 DIAGNOSIS — Z13.0 ENCOUNTER FOR SCREENING FOR HEMATOLOGIC DISORDER: ICD-10-CM

## 2019-11-25 DIAGNOSIS — Z23 NEED FOR VACCINATION: ICD-10-CM

## 2019-11-25 DIAGNOSIS — E55.9 VITAMIN D DEFICIENCY: ICD-10-CM

## 2019-11-25 DIAGNOSIS — N95.2 VAGINAL ATROPHY: ICD-10-CM

## 2019-11-25 PROCEDURE — 99214 OFFICE O/P EST MOD 30 MIN: CPT | Mod: 25 | Performed by: NURSE PRACTITIONER

## 2019-11-25 PROCEDURE — 90715 TDAP VACCINE 7 YRS/> IM: CPT | Performed by: NURSE PRACTITIONER

## 2019-11-25 PROCEDURE — 90472 IMMUNIZATION ADMIN EACH ADD: CPT | Performed by: NURSE PRACTITIONER

## 2019-11-25 PROCEDURE — 90471 IMMUNIZATION ADMIN: CPT | Performed by: NURSE PRACTITIONER

## 2019-11-25 PROCEDURE — 90686 IIV4 VACC NO PRSV 0.5 ML IM: CPT | Performed by: NURSE PRACTITIONER

## 2019-11-25 RX ORDER — SOLIFENACIN SUCCINATE 10 MG/1
10 TABLET, FILM COATED ORAL DAILY
Qty: 90 TAB | Refills: 3 | Status: SHIPPED | OUTPATIENT
Start: 2019-11-25 | End: 2020-02-26 | Stop reason: SDUPTHER

## 2019-11-25 RX ORDER — CIMETIDINE 400 MG/1
400 TABLET, FILM COATED ORAL 2 TIMES DAILY
Qty: 180 TAB | Refills: 3 | Status: SHIPPED
Start: 2019-11-25 | End: 2020-02-26

## 2019-11-25 NOTE — PROGRESS NOTES
Chief Complaint   Patient presents with   • Medication Refill     vesicare    • Medication Problem     Ranitidine recalled          This is a 64 y.o.female patient that presents today with the following: Follow-up visit, discuss medications    Gastroesophageal reflux disease without esophagitis  This is a chronic condition, stable and usually well controlled on PPI as well as H2 blocker.  Has been told by pharmacy that ranitidine has been recalled and she is requesting an alternative, she will start Tagamet 400 mg twice daily in addition to her pantoprazole.  She does understand to avoid aggravating foods and activities as well.    Vaginal atrophy  This is a chronic condition, stable and much improved since starting estradiol vaginal cream, she also reports less frequent UTIs.  Tolerating well with no significant or bothersome side effects.    Mixed hyperlipidemia  The 10-year ASCVD risk score (Durham DC Jr., et al., 2013) is: 3.8%  Patient is on simvastatin 20 mg daily, tolerates this well.  She also continues to practice good lifestyle modifications.    Urinary frequency  See additional notes    Vitamin D deficiency  Patient does have history of vitamin D deficiency, currently takes vitamin D supplement and will be due for labs, these have been ordered.  She is to have these done sometime after February 2020.      No visits with results within 1 Month(s) from this visit.   Latest known visit with results is:   Hospital Outpatient Visit on 08/08/2019   Component Date Value   • Color 08/08/2019 Yellow    • Character 08/08/2019 Clear    • Specific Gravity 08/08/2019 1.022    • Ph 08/08/2019 6.5    • Glucose 08/08/2019 Negative    • Ketones 08/08/2019 Negative    • Protein 08/08/2019 Negative    • Bilirubin 08/08/2019 Negative    • Urobilinogen, Urine 08/08/2019 0.2    • Nitrite 08/08/2019 Positive*   • Leukocyte Esterase 08/08/2019 Negative    • Occult Blood 08/08/2019 Negative    • Micro Urine Req 08/08/2019  Microscopic    • Cholesterol,Tot 08/08/2019 137    • Triglycerides 08/08/2019 156*   • HDL 08/08/2019 46    • LDL 08/08/2019 60    • WBC 08/08/2019 2-5    • RBC 08/08/2019 0-2    • Bacteria 08/08/2019 Many*   • Epithelial Cells 08/08/2019 Negative    • Hyaline Cast 08/08/2019 0-2          clinical course has been stable    Past Medical History:   Diagnosis Date   • GERD (gastroesophageal reflux disease)        Past Surgical History:   Procedure Laterality Date   • OTHER      surgery for deviated septum       Family History   Problem Relation Age of Onset   • Hypertension Mother    • Diabetes Father    • Heart Attack Father    • Stroke Father        Codeine    Current Outpatient Medications Ordered in Epic   Medication Sig Dispense Refill   • cimetidine (TAGAMET) 400 MG Tab Take 1 Tab by mouth 2 times a day. 180 Tab 3   • solifenacin (VESICARE) 10 MG tablet Take 1 Tab by mouth every day. 90 Tab 3   • estradiol (ESTRACE) 0.1 MG/GM vaginal cream INSERT 1 GRAM IN VAGINA EVERY DAY 43 g 2   • dicyclomine (BENTYL) 10 MG Cap TAKE 1 CAPSULE BY MOUTH 4 TIMES DAILY BEFORE MEAL(S) AND  AT  BEDTIME 60 Cap 2   • pantoprazole (PROTONIX) 40 MG Tablet Delayed Response TAKE 1 TABLET BY MOUTH ONCE DAILY 90 Tab 3   • simvastatin (ZOCOR) 20 MG Tab Take 1 Tab by mouth every evening. 90 Tab 3   • vitamin D (CHOLECALCIFEROL) 1000 UNIT Tab Take 1,000 Units by mouth every day.     • vitamin D, Ergocalciferol, (DRISDOL) 22669 units Cap capsule Take 1 Cap by mouth every 7 days. 12 Cap 0     No current Epic-ordered facility-administered medications on file.        Constitutional ROS: No unexpected change in weight, No weakness, No unexplained fevers, sweats, or chills  Pulmonary ROS: No chronic cough, sputum, or hemoptysis, No shortness of breath, No recent change in breathing  Cardiovascular ROS: No chest pain, No edema, No palpitations, Positive for hyperlipidemia  Gastrointestinal ROS: No abdominal pain, No nausea, vomiting, diarrhea, or  "constipation.  Positive for GERD  Musculoskeletal/Extremities ROS: No clubbing, No peripheral edema, No pain, redness or swelling on the joints  Neurologic ROS: Normal development, No seizures, No weakness   ROS: Positive per HPI    Physical exam:  /84   Pulse 81   Temp 36.5 °C (97.7 °F) (Temporal)   Resp 16   Ht 1.6 m (5' 3\")   Wt 65.8 kg (145 lb)   SpO2 99%   BMI 25.69 kg/m²   General Appearance: Very pleasant middle-aged older female, alert, no distress, well-nourished, well-groomed  Skin: Skin color, texture, turgor normal. No rashes or lesions.  Lungs: negative findings: normal respiratory rate and rhythm, lungs clear to auscultation  Heart: negative. RRR without murmur, gallop, or rubs.  No ectopy.  Abdomen: Abdomen soft, non-tender. BS normal. No masses,  No organomegaly  Musculoskeletal: negative findings: no evidence of joint instability, no evidence of muscle atrophy, no deformities present  Neurologic: intact, CN II through XII grossly intact    Medical decision making/discussion: Patient will be due for labs anytime after February 2020, after that she will also be due for Pap smear, she can schedule that at her convenience.  Instead of ranitidine she will start cimetidine 400 mg twice daily, she is to otherwise follow-up with me at 6 months, sooner if needed.  Medications have been refilled and she does agree to update immunizations today.    Ruth was seen today for medication refill and medication problem.    Diagnoses and all orders for this visit:    Gastroesophageal reflux disease without esophagitis  -     cimetidine (TAGAMET) 400 MG Tab; Take 1 Tab by mouth 2 times a day.    Urinary frequency  -     solifenacin (VESICARE) 10 MG tablet; Take 1 Tab by mouth every day.    Mixed hyperlipidemia  -     Comp Metabolic Panel; Future  -     Lipid Profile; Future    Vitamin D deficiency  -     VITAMIN D,25 HYDROXY; Future    Vaginal atrophy    Encounter for screening for hematologic " disorder  -     CBC WITH DIFFERENTIAL; Future    Need for hepatitis C screening test  -     HEP C VIRUS ANTIBODY; Future    Need for vaccination  -     Influenza Vaccine Quad Injection (PF)  -     Tdap Vaccine =>6YO IM  -     Discontinue: Zoster Vac Recomb Adjuvanted (SHINGRIX) 50 MCG/0.5ML Recon Susp; 0.5 mL by Intramuscular route Once for 1 dose.  -     Zoster Vac Recomb Adjuvanted (SHINGRIX) 50 MCG/0.5ML Recon Susp; 0.5 mL by Intramuscular route Once for 1 dose.        Return in about 6 months (around 5/25/2020) for Follow-up, Discuss Labs.        Please note that this dictation was created using voice recognition software. I have made every reasonable attempt to correct obvious errors, but I expect that there are errors of grammar and possibly content that I did not discover before finalizing the note.

## 2019-11-25 NOTE — LETTER
[facility]  ZENON Rice  [address] [place] [address]  Fax: [phone]   Authorization for Release/Disclosure of   Protected Health Information   Name: RAEGAN SHOEMAKER : 1955 SSN: [id number]   Address: [place] [address] Phone:    [phone] (home)    I authorize the entity listed below to release/disclose the PHI below to:   [facility]/ZENON Rice and ZENON Rice   Provider or Entity Name: Russell      Address   City, State, Zip   Phone:      Fax:     Reason for request: continuity of care   Information to be released:    [  ] LAST COLONOSCOPY,  including any PATH REPORT and follow-up  [  ] LAST FIT/COLOGUARD RESULT [  ] LAST DEXA  [x  ] LAST MAMMOGRAM  [  ] LAST PAP  [  ] LAST LABS [  ] RETINA EXAM REPORT  [  ] IMMUNIZATION RECORDS  [  ] Release all info      [  ] Check here and initial the line next to each item to release ALL health information INCLUDING  _____ Care and treatment for drug and / or alcohol abuse  _____ HIV testing, infection status, or AIDS  _____ Genetic Testing    DATES OF SERVICE OR TIME PERIOD TO BE DISCLOSED: _____________  I understand and acknowledge that:  * This Authorization may be revoked at any time by you in writing, except if your health information has already been used or disclosed.  * Your health information that will be used or disclosed as a result of you signing this authorization could be re-disclosed by the recipient. If this occurs, your re-disclosed health information may no longer be protected by State or Federal laws.  * You may refuse to sign this Authorization. Your refusal will not affect your ability to obtain treatment.  * This Authorization becomes effective upon signing and will  on (date) __________.      If no date is indicated, this Authorization will  one (1) year from the signature date.    Name: Raegan Shoemaker    Signature:   Date:     2019          PLEASE FAX REQUESTED RECORDS BACK TO: [phone]

## 2019-11-25 NOTE — PATIENT INSTRUCTIONS
Labs anytime after February 2020    Will have you start tagamet 400 mg twice a day    See me in 6 months, sooner if needed    Immunizations

## 2019-11-26 NOTE — ASSESSMENT & PLAN NOTE
This is a chronic condition, stable and usually well controlled on PPI as well as H2 blocker.  Has been told by pharmacy that ranitidine has been recalled and she is requesting an alternative, she will start Tagamet 400 mg twice daily in addition to her pantoprazole.  She does understand to avoid aggravating foods and activities as well.

## 2019-11-26 NOTE — ASSESSMENT & PLAN NOTE
Patient does have history of vitamin D deficiency, currently takes vitamin D supplement and will be due for labs, these have been ordered.  She is to have these done sometime after February 2020.

## 2019-11-26 NOTE — ASSESSMENT & PLAN NOTE
This is a chronic condition, stable and much improved since starting estradiol vaginal cream, she also reports less frequent UTIs.  Tolerating well with no significant or bothersome side effects.

## 2019-11-26 NOTE — ASSESSMENT & PLAN NOTE
The 10-year ASCVD risk score (Nat HERNANDEZ Jr., et al., 2013) is: 3.8%  Patient is on simvastatin 20 mg daily, tolerates this well.  She also continues to practice good lifestyle modifications.

## 2019-12-05 ENCOUNTER — APPOINTMENT (RX ONLY)
Dept: URBAN - METROPOLITAN AREA CLINIC 31 | Facility: CLINIC | Age: 64
Setting detail: DERMATOLOGY
End: 2019-12-05

## 2019-12-05 DIAGNOSIS — D18.0 HEMANGIOMA: ICD-10-CM

## 2019-12-05 DIAGNOSIS — Z71.89 OTHER SPECIFIED COUNSELING: ICD-10-CM

## 2019-12-05 DIAGNOSIS — L57.0 ACTINIC KERATOSIS: ICD-10-CM

## 2019-12-05 DIAGNOSIS — L90.5 SCAR CONDITIONS AND FIBROSIS OF SKIN: ICD-10-CM

## 2019-12-05 DIAGNOSIS — L82.1 OTHER SEBORRHEIC KERATOSIS: ICD-10-CM

## 2019-12-05 DIAGNOSIS — D22 MELANOCYTIC NEVI: ICD-10-CM

## 2019-12-05 DIAGNOSIS — L81.4 OTHER MELANIN HYPERPIGMENTATION: ICD-10-CM

## 2019-12-05 DIAGNOSIS — Z80.8 FAMILY HISTORY OF MALIGNANT NEOPLASM OF OTHER ORGANS OR SYSTEMS: ICD-10-CM

## 2019-12-05 PROBLEM — D22.5 MELANOCYTIC NEVI OF TRUNK: Status: ACTIVE | Noted: 2019-12-05

## 2019-12-05 PROBLEM — K21.9 GASTRO-ESOPHAGEAL REFLUX DISEASE WITHOUT ESOPHAGITIS: Status: ACTIVE | Noted: 2019-12-05

## 2019-12-05 PROBLEM — D48.5 NEOPLASM OF UNCERTAIN BEHAVIOR OF SKIN: Status: ACTIVE | Noted: 2019-12-05

## 2019-12-05 PROBLEM — D18.01 HEMANGIOMA OF SKIN AND SUBCUTANEOUS TISSUE: Status: ACTIVE | Noted: 2019-12-05

## 2019-12-05 PROBLEM — E78.5 HYPERLIPIDEMIA, UNSPECIFIED: Status: ACTIVE | Noted: 2019-12-05

## 2019-12-05 PROCEDURE — 11103 TANGNTL BX SKIN EA SEP/ADDL: CPT

## 2019-12-05 PROCEDURE — 11102 TANGNTL BX SKIN SINGLE LES: CPT

## 2019-12-05 PROCEDURE — ? COUNSELING

## 2019-12-05 PROCEDURE — 17003 DESTRUCT PREMALG LES 2-14: CPT

## 2019-12-05 PROCEDURE — ? BIOPSY BY SHAVE METHOD

## 2019-12-05 PROCEDURE — 17000 DESTRUCT PREMALG LESION: CPT | Mod: 59

## 2019-12-05 PROCEDURE — 99203 OFFICE O/P NEW LOW 30 MIN: CPT | Mod: 25

## 2019-12-05 PROCEDURE — ? LIQUID NITROGEN

## 2019-12-05 ASSESSMENT — LOCATION DETAILED DESCRIPTION DERM
LOCATION DETAILED: RIGHT MEDIAL SUPERIOR CHEST
LOCATION DETAILED: LEFT MEDIAL UPPER BACK
LOCATION DETAILED: LEFT MEDIAL SUPERIOR CHEST
LOCATION DETAILED: LEFT RIB CAGE
LOCATION DETAILED: EPIGASTRIC SKIN
LOCATION DETAILED: LEFT INFERIOR UPPER BACK
LOCATION DETAILED: NASAL DORSUM
LOCATION DETAILED: LEFT INFERIOR CENTRAL MALAR CHEEK
LOCATION DETAILED: LEFT PROXIMAL LATERAL POSTERIOR UPPER ARM

## 2019-12-05 ASSESSMENT — LOCATION SIMPLE DESCRIPTION DERM
LOCATION SIMPLE: LEFT UPPER BACK
LOCATION SIMPLE: CHEST
LOCATION SIMPLE: LEFT CHEEK
LOCATION SIMPLE: NOSE
LOCATION SIMPLE: LEFT POSTERIOR UPPER ARM
LOCATION SIMPLE: ABDOMEN

## 2019-12-05 ASSESSMENT — LOCATION ZONE DERM
LOCATION ZONE: NOSE
LOCATION ZONE: FACE
LOCATION ZONE: TRUNK
LOCATION ZONE: ARM

## 2019-12-05 NOTE — HPI: FULL BODY SKIN EXAMINATION
How Severe Are Your Spot(S)?: mild
What Is The Reason For Today's Visit?: Full Body Skin Examination with No Concerns
What Is The Reason For Today's Visit? (Being Monitored For X): concerning skin lesions on an annual basis
Additional History: Patient denies any changing moles or bleeding or tender spots.\\nShe states her last dermatologist did two excisions on her chest and she thinks they turned out to be benign.

## 2019-12-05 NOTE — PROCEDURE: BIOPSY BY SHAVE METHOD
Lab: [id number]
Curettage Text: The wound bed was treated with curettage after the biopsy was performed.
Post-Care Instructions: I reviewed with the patient in detail post-care instructions. Patient is to keep the biopsy site dry overnight, and then apply vaseline twice daily until healed.
Render In Bullet Format When Appropriate: No
Size Of Lesion In Cm: 1.3
Billing Type: Third-Party Bill
Lab Facility: 
Wound Care: Vaseline
Biopsy Type: H and E
Notification Instructions: Patient will be notified of biopsy results. However, patient instructed to call the office if not contacted within 2 weeks.
Depth Of Biopsy: dermis
Additional Anesthesia Volume In Cc (Will Not Render If 0): 0
Cryotherapy Text: The wound bed was treated with cryotherapy after the biopsy was performed.
Anesthesia Type: 1% lidocaine with epinephrine
Consent: Written consent was obtained and risks were reviewed including but not limited to scarring, infection, bleeding, scabbing, incomplete removal, nerve damage and allergy to anesthesia.
Anesthesia Volume In Cc: 0.5
Type Of Destruction Used: Curettage
Was A Bandage Applied: Yes
Electrodesiccation Text: The wound bed was treated with electrodesiccation after the biopsy was performed.
Dressing: bandage
Hemostasis: Drysol and Electrocautery
Silver Nitrate Text: The wound bed was treated with silver nitrate after the biopsy was performed.
Electrodesiccation And Curettage Text: The wound bed was treated with electrodesiccation and curettage after the biopsy was performed.
Biopsy Method: Personna blade
Detail Level: Detailed
Size Of Lesion In Cm: 0.3
Size Of Lesion In Cm: 0.6

## 2019-12-29 DIAGNOSIS — E78.2 MIXED HYPERLIPIDEMIA: ICD-10-CM

## 2019-12-29 DIAGNOSIS — R10.13 EPIGASTRIC PAIN: ICD-10-CM

## 2019-12-29 DIAGNOSIS — K21.9 GASTROESOPHAGEAL REFLUX DISEASE WITHOUT ESOPHAGITIS: ICD-10-CM

## 2019-12-31 RX ORDER — SIMVASTATIN 20 MG
TABLET ORAL
Qty: 90 TAB | Refills: 0 | Status: SHIPPED | OUTPATIENT
Start: 2019-12-31 | End: 2020-05-14

## 2019-12-31 RX ORDER — DICYCLOMINE HYDROCHLORIDE 10 MG/1
CAPSULE ORAL
Qty: 60 CAP | Refills: 2 | Status: SHIPPED
Start: 2019-12-31 | End: 2020-02-26

## 2020-01-27 DIAGNOSIS — N95.2 VAGINAL ATROPHY: ICD-10-CM

## 2020-01-27 RX ORDER — ESTRADIOL 0.1 MG/G
CREAM VAGINAL
Qty: 43 G | Refills: 2 | Status: SHIPPED | OUTPATIENT
Start: 2020-01-27 | End: 2020-05-14

## 2020-01-27 NOTE — TELEPHONE ENCOUNTER
Was the patient seen in the last year in this department? Yes    Does patient have an active prescription for medications requested? No     Received Request Via: Pharmacy      Pt met protocol?: Yes    LAST OV 11/25/2019

## 2020-02-19 ENCOUNTER — HOSPITAL ENCOUNTER (OUTPATIENT)
Dept: LAB | Facility: MEDICAL CENTER | Age: 65
End: 2020-02-19
Attending: NURSE PRACTITIONER
Payer: COMMERCIAL

## 2020-02-19 DIAGNOSIS — E78.2 MIXED HYPERLIPIDEMIA: ICD-10-CM

## 2020-02-19 DIAGNOSIS — Z11.59 NEED FOR HEPATITIS C SCREENING TEST: ICD-10-CM

## 2020-02-19 DIAGNOSIS — E55.9 VITAMIN D DEFICIENCY: ICD-10-CM

## 2020-02-19 DIAGNOSIS — Z13.0 ENCOUNTER FOR SCREENING FOR HEMATOLOGIC DISORDER: ICD-10-CM

## 2020-02-19 LAB
25(OH)D3 SERPL-MCNC: 37 NG/ML (ref 30–100)
ALBUMIN SERPL BCP-MCNC: 5 G/DL (ref 3.2–4.9)
ALBUMIN/GLOB SERPL: 1.7 G/DL
ALP SERPL-CCNC: 69 U/L (ref 30–99)
ALT SERPL-CCNC: 28 U/L (ref 2–50)
ANION GAP SERPL CALC-SCNC: 9 MMOL/L (ref 0–11.9)
AST SERPL-CCNC: 22 U/L (ref 12–45)
BASOPHILS # BLD AUTO: 0.5 % (ref 0–1.8)
BASOPHILS # BLD: 0.03 K/UL (ref 0–0.12)
BILIRUB SERPL-MCNC: 0.6 MG/DL (ref 0.1–1.5)
BUN SERPL-MCNC: 13 MG/DL (ref 8–22)
CALCIUM SERPL-MCNC: 9.5 MG/DL (ref 8.5–10.5)
CHLORIDE SERPL-SCNC: 103 MMOL/L (ref 96–112)
CHOLEST SERPL-MCNC: 157 MG/DL (ref 100–199)
CO2 SERPL-SCNC: 24 MMOL/L (ref 20–33)
CREAT SERPL-MCNC: 0.86 MG/DL (ref 0.5–1.4)
EOSINOPHIL # BLD AUTO: 0.12 K/UL (ref 0–0.51)
EOSINOPHIL NFR BLD: 2 % (ref 0–6.9)
ERYTHROCYTE [DISTWIDTH] IN BLOOD BY AUTOMATED COUNT: 42.7 FL (ref 35.9–50)
GLOBULIN SER CALC-MCNC: 2.9 G/DL (ref 1.9–3.5)
GLUCOSE SERPL-MCNC: 88 MG/DL (ref 65–99)
HCT VFR BLD AUTO: 46 % (ref 37–47)
HDLC SERPL-MCNC: 48 MG/DL
HGB BLD-MCNC: 14.9 G/DL (ref 12–16)
IMM GRANULOCYTES # BLD AUTO: 0.02 K/UL (ref 0–0.11)
IMM GRANULOCYTES NFR BLD AUTO: 0.3 % (ref 0–0.9)
LDLC SERPL CALC-MCNC: 65 MG/DL
LYMPHOCYTES # BLD AUTO: 2.35 K/UL (ref 1–4.8)
LYMPHOCYTES NFR BLD: 39.7 % (ref 22–41)
MCH RBC QN AUTO: 30.1 PG (ref 27–33)
MCHC RBC AUTO-ENTMCNC: 32.4 G/DL (ref 33.6–35)
MCV RBC AUTO: 92.9 FL (ref 81.4–97.8)
MONOCYTES # BLD AUTO: 0.36 K/UL (ref 0–0.85)
MONOCYTES NFR BLD AUTO: 6.1 % (ref 0–13.4)
NEUTROPHILS # BLD AUTO: 3.04 K/UL (ref 2–7.15)
NEUTROPHILS NFR BLD: 51.4 % (ref 44–72)
NRBC # BLD AUTO: 0 K/UL
NRBC BLD-RTO: 0 /100 WBC
PLATELET # BLD AUTO: 172 K/UL (ref 164–446)
PMV BLD AUTO: 12.1 FL (ref 9–12.9)
POTASSIUM SERPL-SCNC: 3.9 MMOL/L (ref 3.6–5.5)
PROT SERPL-MCNC: 7.9 G/DL (ref 6–8.2)
RBC # BLD AUTO: 4.95 M/UL (ref 4.2–5.4)
SODIUM SERPL-SCNC: 136 MMOL/L (ref 135–145)
TRIGL SERPL-MCNC: 220 MG/DL (ref 0–149)
WBC # BLD AUTO: 5.9 K/UL (ref 4.8–10.8)

## 2020-02-19 PROCEDURE — 85025 COMPLETE CBC W/AUTO DIFF WBC: CPT

## 2020-02-19 PROCEDURE — 36415 COLL VENOUS BLD VENIPUNCTURE: CPT

## 2020-02-19 PROCEDURE — 80053 COMPREHEN METABOLIC PANEL: CPT

## 2020-02-19 PROCEDURE — 86803 HEPATITIS C AB TEST: CPT

## 2020-02-19 PROCEDURE — 80061 LIPID PANEL: CPT

## 2020-02-19 PROCEDURE — 82306 VITAMIN D 25 HYDROXY: CPT

## 2020-02-20 LAB — HCV AB SER QL: NEGATIVE

## 2020-02-26 ENCOUNTER — HOSPITAL ENCOUNTER (OUTPATIENT)
Facility: MEDICAL CENTER | Age: 65
End: 2020-02-26
Attending: NURSE PRACTITIONER
Payer: COMMERCIAL

## 2020-02-26 ENCOUNTER — OFFICE VISIT (OUTPATIENT)
Dept: MEDICAL GROUP | Facility: PHYSICIAN GROUP | Age: 65
End: 2020-02-26
Payer: COMMERCIAL

## 2020-02-26 VITALS
HEART RATE: 90 BPM | BODY MASS INDEX: 26.05 KG/M2 | OXYGEN SATURATION: 99 % | WEIGHT: 147 LBS | SYSTOLIC BLOOD PRESSURE: 132 MMHG | DIASTOLIC BLOOD PRESSURE: 88 MMHG | HEIGHT: 63 IN | RESPIRATION RATE: 14 BRPM | TEMPERATURE: 98.2 F

## 2020-02-26 DIAGNOSIS — Z11.51 SCREENING FOR HPV (HUMAN PAPILLOMAVIRUS): ICD-10-CM

## 2020-02-26 DIAGNOSIS — Z01.419 ENCOUNTER FOR GYNECOLOGICAL EXAMINATION: ICD-10-CM

## 2020-02-26 DIAGNOSIS — Z12.4 SCREENING FOR CERVICAL CANCER: ICD-10-CM

## 2020-02-26 DIAGNOSIS — Z01.419 WELL WOMAN EXAM WITH ROUTINE GYNECOLOGICAL EXAM: ICD-10-CM

## 2020-02-26 DIAGNOSIS — R35.0 URINARY FREQUENCY: ICD-10-CM

## 2020-02-26 PROCEDURE — 99396 PREV VISIT EST AGE 40-64: CPT | Performed by: NURSE PRACTITIONER

## 2020-02-26 PROCEDURE — 87624 HPV HI-RISK TYP POOLED RSLT: CPT

## 2020-02-26 PROCEDURE — 88175 CYTOPATH C/V AUTO FLUID REDO: CPT

## 2020-02-26 PROCEDURE — 99000 SPECIMEN HANDLING OFFICE-LAB: CPT | Performed by: NURSE PRACTITIONER

## 2020-02-26 RX ORDER — SOLIFENACIN SUCCINATE 10 MG/1
10 TABLET, FILM COATED ORAL DAILY
Qty: 90 TAB | Refills: 3 | Status: SHIPPED | OUTPATIENT
Start: 2020-02-26 | End: 2020-08-26 | Stop reason: SDUPTHER

## 2020-02-26 ASSESSMENT — PATIENT HEALTH QUESTIONNAIRE - PHQ9: CLINICAL INTERPRETATION OF PHQ2 SCORE: 0

## 2020-02-26 NOTE — PROGRESS NOTES
Chief Complaint   Patient presents with   • Gynecologic Exam         This is a 64 y.o.female patient that presents today with the following: Well woman exam including Pap    Well woman exam with routine gynecological exam  Pt here for well woman exam including PAP/HPV. Her last PAP was 2/2017 And it was normal. She denies concerns for unusual vaginal discharge, odor,  or pain with intercourse.  Does have perineal itching.  She no longer has menses as she is post menopausal and denies post-menopausal bleeding.  She does not have any concerns with her breasts. She is up to date with her mammogram. She does/does not do monthly self breast exams. She was encouraged to continue with monthly SBEs, instructions were given during exam.           Hospital Outpatient Visit on 02/19/2020   Component Date Value   • 25-Hydroxy   Vitamin D 25 02/19/2020 37    • Hepatitis C Antibody 02/19/2020 Negative    • Cholesterol,Tot 02/19/2020 157    • Triglycerides 02/19/2020 220*   • HDL 02/19/2020 48    • LDL 02/19/2020 65    • Sodium 02/19/2020 136    • Potassium 02/19/2020 3.9    • Chloride 02/19/2020 103    • Co2 02/19/2020 24    • Anion Gap 02/19/2020 9.0    • Glucose 02/19/2020 88    • Bun 02/19/2020 13    • Creatinine 02/19/2020 0.86    • Calcium 02/19/2020 9.5    • AST(SGOT) 02/19/2020 22    • ALT(SGPT) 02/19/2020 28    • Alkaline Phosphatase 02/19/2020 69    • Total Bilirubin 02/19/2020 0.6    • Albumin 02/19/2020 5.0*   • Total Protein 02/19/2020 7.9    • Globulin 02/19/2020 2.9    • A-G Ratio 02/19/2020 1.7    • WBC 02/19/2020 5.9    • RBC 02/19/2020 4.95    • Hemoglobin 02/19/2020 14.9    • Hematocrit 02/19/2020 46.0    • MCV 02/19/2020 92.9    • MCH 02/19/2020 30.1    • MCHC 02/19/2020 32.4*   • RDW 02/19/2020 42.7    • Platelet Count 02/19/2020 172    • MPV 02/19/2020 12.1    • Neutrophils-Polys 02/19/2020 51.40    • Lymphocytes 02/19/2020 39.70    • Monocytes 02/19/2020 6.10    • Eosinophils 02/19/2020 2.00    • Basophils  02/19/2020 0.50    • Immature Granulocytes 02/19/2020 0.30    • Nucleated RBC 02/19/2020 0.00    • Neutrophils (Absolute) 02/19/2020 3.04    • Lymphs (Absolute) 02/19/2020 2.35    • Monos (Absolute) 02/19/2020 0.36    • Eos (Absolute) 02/19/2020 0.12    • Baso (Absolute) 02/19/2020 0.03    • Immature Granulocytes (a* 02/19/2020 0.02    • NRBC (Absolute) 02/19/2020 0.00    • GFR If  02/19/2020 >60    • GFR If Non  Ameri* 02/19/2020 >60          clinical course has been stable    Past Medical History:   Diagnosis Date   • GERD (gastroesophageal reflux disease)        Past Surgical History:   Procedure Laterality Date   • OTHER      surgery for deviated septum       Family History   Problem Relation Age of Onset   • Hypertension Mother    • Diabetes Father    • Heart Attack Father    • Stroke Father        Codeine    Current Outpatient Medications Ordered in Epic   Medication Sig Dispense Refill   • solifenacin (VESICARE) 10 MG tablet Take 1 Tab by mouth every day. Brand name only 90 Tab 3   • estradiol (ESTRACE) 0.1 MG/GM vaginal cream INSERT 1 GRAM VAGINALLY ONCE DAILY 43 g 2   • simvastatin (ZOCOR) 20 MG Tab TAKE 1 TABLET BY MOUTH ONCE DAILY IN THE EVENING 90 Tab 0   • pantoprazole (PROTONIX) 40 MG Tablet Delayed Response TAKE 1 TABLET BY MOUTH ONCE DAILY 90 Tab 3   • vitamin D (CHOLECALCIFEROL) 1000 UNIT Tab Take 1,000 Units by mouth every day.       No current [facility]-ordered facility-administered medications on file.        Constitutional ROS: No unexpected change in weight, No weakness, No unexplained fevers, sweats, or chills  Pulmonary ROS: No chronic cough, sputum, or hemoptysis, No shortness of breath, No recent change in breathing  Cardiovascular ROS: No chest pain, No edema, No palpitations  Gastrointestinal ROS: No abdominal pain, No nausea, vomiting, diarrhea, or constipation, Positive for rectal itching  Breast ROS: No new breast lumps or masses, No severe breast pain, No nipple  "discharge, No recent change in shape/color, Performs self breast exam  Musculoskeletal/Extremities ROS: No clubbing, No peripheral edema, No pain, redness or swelling on the joints  Neurologic ROS: Normal development, No seizures, No weakness   ROS: Positive per HPI    Physical exam:  /88   Pulse 90   Temp 36.8 °C (98.2 °F) (Temporal)   Resp 14   Ht 1.6 m (5' 3\")   Wt 66.7 kg (147 lb)   SpO2 99%   BMI 26.04 kg/m²   General Appearance: Very pleasant older female, alert, no distress, mildly overweight, well-groomed  Skin: Skin color, texture, turgor normal. No rashes or lesions.  Lungs: negative findings: normal respiratory rate and rhythm, lungs clear to auscultation  Heart: negative. RRR without murmur, gallop, or rubs.  No ectopy.  Breast: Deferred, up-to-date with mammogram  Abdomen: Abdomen soft, non-tender. BS normal. No masses,  No organomegaly  Musculoskeletal: negative findings: no evidence of joint instability, no evidence of muscle atrophy, no deformities present  Neurologic: intact, CN II through XII grossly intact  Pelvic: External genitalia, cervix, and vagina normal., Bimanual exam normal.  Rectal: negative findings: No evidence of external hemorrhoids, no surrounding lesions    Medical decision making/discussion:     Continue breast self exams    Have annual mammograms    Will notify patient of results as they become available    Vesicare refilled    Ruth was seen today for gynecologic exam.    Diagnoses and all orders for this visit:    Well woman exam with routine gynecological exam  -     Thinprep Pap with HPV; Future    Screening for cervical cancer  -     Thinprep Pap with HPV; Future    Encounter for gynecological examination  -     Thinprep Pap with HPV; Future    Screening for HPV (human papillomavirus)  -     Thinprep Pap with HPV; Future    Urinary frequency  -     solifenacin (VESICARE) 10 MG tablet; Take 1 Tab by mouth every day. Brand name only        Return if symptoms " worsen or fail to improve, for Follow-up.        Please note that this dictation was created using voice recognition software. I have made every reasonable attempt to correct obvious errors, but I expect that there are errors of grammar and possibly content that I did not discover before finalizing the note.

## 2020-02-26 NOTE — ASSESSMENT & PLAN NOTE
Pt here for well woman exam including PAP/HPV. Her last PAP was 2/2017 And it was normal. She denies concerns for unusual vaginal discharge, odor,  or pain with intercourse.  Does have perineal itching.  She no longer has menses as she is post menopausal and denies post-menopausal bleeding.  She does not have any concerns with her breasts. She is up to date with her mammogram. She does/does not do monthly self breast exams. She was encouraged to continue with monthly SBEs, instructions were given during exam.

## 2020-05-14 DIAGNOSIS — E78.2 MIXED HYPERLIPIDEMIA: ICD-10-CM

## 2020-05-14 DIAGNOSIS — N95.2 VAGINAL ATROPHY: ICD-10-CM

## 2020-05-14 RX ORDER — SIMVASTATIN 20 MG
TABLET ORAL
Qty: 90 TAB | Refills: 1 | Status: SHIPPED | OUTPATIENT
Start: 2020-05-14 | End: 2020-11-03 | Stop reason: SDUPTHER

## 2020-05-14 RX ORDER — ESTRADIOL 0.1 MG/G
CREAM VAGINAL
Qty: 43 G | Refills: 2 | Status: SHIPPED | OUTPATIENT
Start: 2020-05-14 | End: 2020-08-19

## 2020-05-14 NOTE — TELEPHONE ENCOUNTER
Was the patient seen in the last year in this department? Yes    Does patient have an active prescription for medications requested? No     Received Request Via: Pharmacy    Pt met protocol?: Yes     Last OV 02/26/2020    Lab Results   Component Value Date/Time    CHOLSTRLTOT 157 02/19/2020 1429    TRIGLYCERIDE 220 (H) 02/19/2020 1429    HDL 48 02/19/2020 1429    LDL 65 02/19/2020 1429

## 2020-05-15 NOTE — TELEPHONE ENCOUNTER
Pt has had OV within the 12 month protocol and lipid panel is current. 6 month supply sent to pharmacy.   Lab Results   Component Value Date/Time    CHOLSTRLTOT 157 02/19/2020 02:29 PM    LDL 65 02/19/2020 02:29 PM    HDL 48 02/19/2020 02:29 PM    TRIGLYCERIDE 220 (H) 02/19/2020 02:29 PM       Lab Results   Component Value Date/Time    SODIUM 136 02/19/2020 02:29 PM    POTASSIUM 3.9 02/19/2020 02:29 PM    CHLORIDE 103 02/19/2020 02:29 PM    CO2 24 02/19/2020 02:29 PM    GLUCOSE 88 02/19/2020 02:29 PM    BUN 13 02/19/2020 02:29 PM    CREATININE 0.86 02/19/2020 02:29 PM     Lab Results   Component Value Date/Time    ALKPHOSPHAT 69 02/19/2020 02:29 PM    ASTSGOT 22 02/19/2020 02:29 PM    ALTSGPT 28 02/19/2020 02:29 PM    TBILIRUBIN 0.6 02/19/2020 02:29 PM

## 2020-07-15 DIAGNOSIS — K21.9 GASTROESOPHAGEAL REFLUX DISEASE WITHOUT ESOPHAGITIS: ICD-10-CM

## 2020-07-15 DIAGNOSIS — R10.13 EPIGASTRIC PAIN: ICD-10-CM

## 2020-07-18 RX ORDER — DICYCLOMINE HYDROCHLORIDE 10 MG/1
CAPSULE ORAL
Qty: 60 CAP | Refills: 1 | Status: SHIPPED | OUTPATIENT
Start: 2020-07-18 | End: 2020-09-22

## 2020-07-18 RX ORDER — PANTOPRAZOLE SODIUM 40 MG/1
TABLET, DELAYED RELEASE ORAL
Qty: 90 TAB | Refills: 3 | Status: SHIPPED | OUTPATIENT
Start: 2020-07-18 | End: 2021-07-23

## 2020-07-18 NOTE — TELEPHONE ENCOUNTER
Received request via: Pharmacy    Was the patient seen in the last year in this department? Yes    Does the patient have an active prescription (recently filled or refills available) for medication(s) requested? No  .

## 2020-08-20 ENCOUNTER — HOSPITAL ENCOUNTER (OUTPATIENT)
Dept: LAB | Facility: MEDICAL CENTER | Age: 65
End: 2020-08-20
Attending: NURSE PRACTITIONER
Payer: COMMERCIAL

## 2020-08-20 DIAGNOSIS — E78.2 MIXED HYPERLIPIDEMIA: ICD-10-CM

## 2020-08-20 DIAGNOSIS — N39.0 FREQUENT UTI: ICD-10-CM

## 2020-08-20 DIAGNOSIS — Z13.0 ENCOUNTER FOR SCREENING FOR HEMATOLOGIC DISORDER: ICD-10-CM

## 2020-08-20 DIAGNOSIS — R30.0 DYSURIA: ICD-10-CM

## 2020-08-20 LAB
ALBUMIN SERPL BCP-MCNC: 4.8 G/DL (ref 3.2–4.9)
ALBUMIN/GLOB SERPL: 1.7 G/DL
ALP SERPL-CCNC: 71 U/L (ref 30–99)
ALT SERPL-CCNC: 24 U/L (ref 2–50)
ANION GAP SERPL CALC-SCNC: 15 MMOL/L (ref 7–16)
APPEARANCE UR: CLEAR
AST SERPL-CCNC: 19 U/L (ref 12–45)
BASOPHILS # BLD AUTO: 0.5 % (ref 0–1.8)
BASOPHILS # BLD: 0.03 K/UL (ref 0–0.12)
BILIRUB SERPL-MCNC: 0.6 MG/DL (ref 0.1–1.5)
BILIRUB UR QL STRIP.AUTO: NEGATIVE
BUN SERPL-MCNC: 15 MG/DL (ref 8–22)
CALCIUM SERPL-MCNC: 9.7 MG/DL (ref 8.5–10.5)
CHLORIDE SERPL-SCNC: 102 MMOL/L (ref 96–112)
CHOLEST SERPL-MCNC: 144 MG/DL (ref 100–199)
CO2 SERPL-SCNC: 22 MMOL/L (ref 20–33)
COLOR UR: YELLOW
CREAT SERPL-MCNC: 0.79 MG/DL (ref 0.5–1.4)
EOSINOPHIL # BLD AUTO: 0.09 K/UL (ref 0–0.51)
EOSINOPHIL NFR BLD: 1.5 % (ref 0–6.9)
ERYTHROCYTE [DISTWIDTH] IN BLOOD BY AUTOMATED COUNT: 41.2 FL (ref 35.9–50)
FASTING STATUS PATIENT QL REPORTED: NORMAL
GLOBULIN SER CALC-MCNC: 2.9 G/DL (ref 1.9–3.5)
GLUCOSE SERPL-MCNC: 88 MG/DL (ref 65–99)
GLUCOSE UR STRIP.AUTO-MCNC: NEGATIVE MG/DL
HCT VFR BLD AUTO: 46 % (ref 37–47)
HDLC SERPL-MCNC: 48 MG/DL
HGB BLD-MCNC: 15 G/DL (ref 12–16)
IMM GRANULOCYTES # BLD AUTO: 0.02 K/UL (ref 0–0.11)
IMM GRANULOCYTES NFR BLD AUTO: 0.3 % (ref 0–0.9)
KETONES UR STRIP.AUTO-MCNC: NEGATIVE MG/DL
LDLC SERPL CALC-MCNC: 63 MG/DL
LEUKOCYTE ESTERASE UR QL STRIP.AUTO: NEGATIVE
LYMPHOCYTES # BLD AUTO: 2.47 K/UL (ref 1–4.8)
LYMPHOCYTES NFR BLD: 40.4 % (ref 22–41)
MCH RBC QN AUTO: 29.7 PG (ref 27–33)
MCHC RBC AUTO-ENTMCNC: 32.6 G/DL (ref 33.6–35)
MCV RBC AUTO: 91.1 FL (ref 81.4–97.8)
MICRO URNS: NORMAL
MONOCYTES # BLD AUTO: 0.44 K/UL (ref 0–0.85)
MONOCYTES NFR BLD AUTO: 7.2 % (ref 0–13.4)
NEUTROPHILS # BLD AUTO: 3.06 K/UL (ref 2–7.15)
NEUTROPHILS NFR BLD: 50.1 % (ref 44–72)
NITRITE UR QL STRIP.AUTO: NEGATIVE
NRBC # BLD AUTO: 0 K/UL
NRBC BLD-RTO: 0 /100 WBC
PH UR STRIP.AUTO: 5.5 [PH] (ref 5–8)
PLATELET # BLD AUTO: 163 K/UL (ref 164–446)
PMV BLD AUTO: 12.9 FL (ref 9–12.9)
POTASSIUM SERPL-SCNC: 3.7 MMOL/L (ref 3.6–5.5)
PROT SERPL-MCNC: 7.7 G/DL (ref 6–8.2)
PROT UR QL STRIP: NEGATIVE MG/DL
RBC # BLD AUTO: 5.05 M/UL (ref 4.2–5.4)
RBC UR QL AUTO: NEGATIVE
SODIUM SERPL-SCNC: 139 MMOL/L (ref 135–145)
SP GR UR STRIP.AUTO: 1.02
TRIGL SERPL-MCNC: 167 MG/DL (ref 0–149)
UROBILINOGEN UR STRIP.AUTO-MCNC: 0.2 MG/DL
WBC # BLD AUTO: 6.1 K/UL (ref 4.8–10.8)

## 2020-08-20 PROCEDURE — 80053 COMPREHEN METABOLIC PANEL: CPT

## 2020-08-20 PROCEDURE — 80061 LIPID PANEL: CPT

## 2020-08-20 PROCEDURE — 81003 URINALYSIS AUTO W/O SCOPE: CPT

## 2020-08-20 PROCEDURE — 85025 COMPLETE CBC W/AUTO DIFF WBC: CPT

## 2020-08-20 PROCEDURE — 36415 COLL VENOUS BLD VENIPUNCTURE: CPT

## 2020-08-26 ENCOUNTER — OFFICE VISIT (OUTPATIENT)
Dept: MEDICAL GROUP | Facility: PHYSICIAN GROUP | Age: 65
End: 2020-08-26
Payer: COMMERCIAL

## 2020-08-26 VITALS
HEART RATE: 90 BPM | TEMPERATURE: 98.6 F | BODY MASS INDEX: 26.05 KG/M2 | SYSTOLIC BLOOD PRESSURE: 138 MMHG | HEIGHT: 63 IN | DIASTOLIC BLOOD PRESSURE: 90 MMHG | WEIGHT: 147 LBS | OXYGEN SATURATION: 97 % | RESPIRATION RATE: 16 BRPM

## 2020-08-26 DIAGNOSIS — E55.9 VITAMIN D DEFICIENCY: ICD-10-CM

## 2020-08-26 DIAGNOSIS — N95.2 VAGINAL ATROPHY: ICD-10-CM

## 2020-08-26 DIAGNOSIS — R35.0 URINARY FREQUENCY: ICD-10-CM

## 2020-08-26 DIAGNOSIS — E78.2 MIXED HYPERLIPIDEMIA: ICD-10-CM

## 2020-08-26 PROBLEM — Z77.22 SECOND HAND SMOKE EXPOSURE: Status: ACTIVE | Noted: 2020-08-26

## 2020-08-26 PROCEDURE — 99214 OFFICE O/P EST MOD 30 MIN: CPT | Performed by: NURSE PRACTITIONER

## 2020-08-26 RX ORDER — SOLIFENACIN SUCCINATE 10 MG/1
10 TABLET, FILM COATED ORAL DAILY
Qty: 90 TAB | Refills: 3 | Status: SHIPPED | OUTPATIENT
Start: 2020-08-26 | End: 2023-04-18 | Stop reason: SDUPTHER

## 2020-08-26 ASSESSMENT — FIBROSIS 4 INDEX: FIB4 SCORE: 1.5

## 2020-08-26 NOTE — PROGRESS NOTES
Chief Complaint   Patient presents with   • Medication Refill   • Lab Results         This is a 64 y.o.female patient that presents today with the following: Follow-up, review labs, medication refills    Urinary frequency  Patient has chronic urinary frequency for which she is on Vesicare 10 mg daily  She does need a refill and is asking that I put a note to the pharmacist to dispense brand name only as she does not tolerate the generic version  This was done for her, reports this medication works very well for her    Vitamin D deficiency  Patient has history of vitamin D deficiency   she will be due for labs before her next appointment in 6 months  Currently takes vitamin D supplement    Mixed hyperlipidemia  The 10-year ASCVD risk score (Melvin DAVID Jr., et al., 2013) is: 4.6%  Patient is taking simvastatin 20 mg daily  Will be due for labs before her next appointment in 6 months  Discussed the importance of ongoing lifestyle modifications    Vaginal atrophy  Patient does have vaginal atrophy associated with menopause  She does use estradiol cream, does need a refill on this  She does understand the risks associated with hormone replacement therapy  She is up-to-date with mammograms, does not smoke      Hospital Outpatient Visit on 08/20/2020   Component Date Value   • WBC 08/20/2020 6.1    • RBC 08/20/2020 5.05    • Hemoglobin 08/20/2020 15.0    • Hematocrit 08/20/2020 46.0    • MCV 08/20/2020 91.1    • MCH 08/20/2020 29.7    • MCHC 08/20/2020 32.6*   • RDW 08/20/2020 41.2    • Platelet Count 08/20/2020 163*   • MPV 08/20/2020 12.9    • Neutrophils-Polys 08/20/2020 50.10    • Lymphocytes 08/20/2020 40.40    • Monocytes 08/20/2020 7.20    • Eosinophils 08/20/2020 1.50    • Basophils 08/20/2020 0.50    • Immature Granulocytes 08/20/2020 0.30    • Nucleated RBC 08/20/2020 0.00    • Neutrophils (Absolute) 08/20/2020 3.06    • Lymphs (Absolute) 08/20/2020 2.47    • Monos (Absolute) 08/20/2020 0.44    • Eos (Absolute)  08/20/2020 0.09    • Baso (Absolute) 08/20/2020 0.03    • Immature Granulocytes (a* 08/20/2020 0.02    • NRBC (Absolute) 08/20/2020 0.00    • Color 08/20/2020 Yellow    • Character 08/20/2020 Clear    • Specific Gravity 08/20/2020 1.022    • Ph 08/20/2020 5.5    • Glucose 08/20/2020 Negative    • Ketones 08/20/2020 Negative    • Protein 08/20/2020 Negative    • Bilirubin 08/20/2020 Negative    • Urobilinogen, Urine 08/20/2020 0.2    • Nitrite 08/20/2020 Negative    • Leukocyte Esterase 08/20/2020 Negative    • Occult Blood 08/20/2020 Negative    • Micro Urine Req 08/20/2020 see below    • Sodium 08/20/2020 139    • Potassium 08/20/2020 3.7    • Chloride 08/20/2020 102    • Co2 08/20/2020 22    • Anion Gap 08/20/2020 15.0    • Glucose 08/20/2020 88    • Bun 08/20/2020 15    • Creatinine 08/20/2020 0.79    • Calcium 08/20/2020 9.7    • AST(SGOT) 08/20/2020 19    • ALT(SGPT) 08/20/2020 24    • Alkaline Phosphatase 08/20/2020 71    • Total Bilirubin 08/20/2020 0.6    • Albumin 08/20/2020 4.8    • Total Protein 08/20/2020 7.7    • Globulin 08/20/2020 2.9    • A-G Ratio 08/20/2020 1.7    • Cholesterol,Tot 08/20/2020 144    • Triglycerides 08/20/2020 167*   • HDL 08/20/2020 48    • LDL 08/20/2020 63    • Fasting Status 08/20/2020 Fasting    • GFR If  08/20/2020 >60    • GFR If Non  Ameri* 08/20/2020 >60          clinical course has been stable    Past Medical History:   Diagnosis Date   • GERD (gastroesophageal reflux disease)        Past Surgical History:   Procedure Laterality Date   • OTHER      surgery for deviated septum       Family History   Problem Relation Age of Onset   • Hypertension Mother    • Diabetes Father    • Heart Attack Father    • Stroke Father        Codeine    Current Outpatient Medications Ordered in Epic   Medication Sig Dispense Refill   • solifenacin (VESICARE) 10 MG tablet Take 1 Tab by mouth every day. Brand name only--please dispense brand name as pt does not tolerate  "the generic version of medication, please dispense 90 Tab 3   • estradiol (ESTRACE) 0.1 MG/GM vaginal cream INSERT 1 GRAM VAGINALLY ONCE DAILY 43 g 3   • dicyclomine (BENTYL) 10 MG Cap TAKE 1 CAPSULE BY MOUTH 4 TIMES DAILY BEFORE MEAL(S) AND AT BEDTIME 60 Cap 1   • pantoprazole (PROTONIX) 40 MG Tablet Delayed Response Take 1 tablet by mouth once daily 90 Tab 3   • simvastatin (ZOCOR) 20 MG Tab TAKE 1 TABLET BY MOUTH ONCE DAILY IN THE EVENING 90 Tab 1   • vitamin D (CHOLECALCIFEROL) 1000 UNIT Tab Take 1,000 Units by mouth every day.       No current [facility]-ordered facility-administered medications on file.        Constitutional ROS: No unexpected change in weight, No weakness, No unexplained fevers, sweats, or chills  Pulmonary ROS: No chronic cough, sputum, or hemoptysis, No shortness of breath, No recent change in breathing  Cardiovascular ROS: No chest pain, No edema, No palpitations  Gastrointestinal ROS: No abdominal pain, No nausea, vomiting, diarrhea, or constipation  Musculoskeletal/Extremities ROS: No clubbing, No peripheral edema, No pain, redness or swelling on the joints  Neurologic ROS: Normal development, No seizures, No weakness   ROS: Positive per HPI    Physical exam:  /90   Pulse 90   Temp 37 °C (98.6 °F) (Temporal)   Resp 16   Ht 1.6 m (5' 3\")   Wt 66.7 kg (147 lb)   SpO2 97%   BMI 26.04 kg/m²   General Appearance: Very pleasant older female, alert, no distress, well-nourished, well-groomed  Skin: Skin color, texture, turgor normal. No rashes or lesions.  Lungs: negative findings: normal respiratory rate and rhythm, lungs clear to auscultation  Heart: negative. RRR without murmur, gallop, or rubs.  No ectopy.  Abdomen: Abdomen soft, non-tender. BS normal. No masses,  No organomegaly  Musculoskeletal: negative findings: no evidence of joint instability, no evidence of muscle atrophy, no deformities present  Neurologic: intact, CN II through XII grossly intact    Medical decision " making/discussion:     Follow up 6 months    Labs before visit    Ruth was seen today for medication refill and lab results.    Diagnoses and all orders for this visit:    Mixed hyperlipidemia  -     Lipid Profile; Future    Urinary frequency  -     solifenacin (VESICARE) 10 MG tablet; Take 1 Tab by mouth every day. Brand name only--please dispense brand name as pt does not tolerate the generic version of medication, please dispense    Vaginal atrophy    Vitamin D deficiency  -     VITAMIN D,25 HYDROXY; Future        Return in about 6 months (around 2/26/2021) for Discuss Labs, Follow-up.        Please note that this dictation was created using voice recognition software. I have made every reasonable attempt to correct obvious errors, but I expect that there are errors of grammar and possibly content that I did not discover before finalizing the note.

## 2020-08-27 NOTE — ASSESSMENT & PLAN NOTE
The 10-year ASCVD risk score (Weyers Cavekeara HERNANDEZ Jr., et al., 2013) is: 4.6%  Patient is taking simvastatin 20 mg daily  Will be due for labs before her next appointment in 6 months  Discussed the importance of ongoing lifestyle modifications

## 2020-08-27 NOTE — ASSESSMENT & PLAN NOTE
Patient does have vaginal atrophy associated with menopause  She does use estradiol cream, does need a refill on this  She does understand the risks associated with hormone replacement therapy  She is up-to-date with mammograms, does not smoke

## 2020-08-27 NOTE — ASSESSMENT & PLAN NOTE
Patient has chronic urinary frequency for which she is on Vesicare 10 mg daily  She does need a refill and is asking that I put a note to the pharmacist to dispense brand name only as she does not tolerate the generic version  This was done for her, reports this medication works very well for her

## 2020-08-27 NOTE — ASSESSMENT & PLAN NOTE
Patient has history of vitamin D deficiency   she will be due for labs before her next appointment in 6 months  Currently takes vitamin D supplement

## 2020-09-19 DIAGNOSIS — K21.9 GASTROESOPHAGEAL REFLUX DISEASE WITHOUT ESOPHAGITIS: ICD-10-CM

## 2020-09-19 DIAGNOSIS — R10.13 EPIGASTRIC PAIN: ICD-10-CM

## 2020-09-22 RX ORDER — DICYCLOMINE HYDROCHLORIDE 10 MG/1
CAPSULE ORAL
Qty: 60 CAP | Refills: 1 | Status: SHIPPED | OUTPATIENT
Start: 2020-09-22 | End: 2020-11-17

## 2020-10-01 NOTE — MR AVS SNAPSHOT
"        Ruth Shoemaker   2017 1:00 PM   Office Visit   MRN: [id number]    Department:  [facility]   Dept Phone:  [phone]    Description:  Female : 1955   Provider:  ZENON Rice           Reason for Visit     Follow-Up Labs      Allergies as of 2017     Allergen Noted Reactions    Codeine 10/14/2016   Nausea      You were diagnosed with     Screening for cervical cancer   [812174]       Screening for breast cancer   [619910]       Gastroesophageal reflux disease without esophagitis   [926739]       Urinary frequency   [788.41.ICD-9-CM]       Epigastric pain   [788720]       Vitamin D deficiency   [4661345]         Vital Signs     Blood Pressure Pulse Temperature Respirations Height Weight    136/84 mmHg 96 36.9 °C (98.5 °F) 16 1.6 m (5' 3\") 58.06 kg (128 lb)    Body Mass Index Oxygen Saturation Breastfeeding? Smoking Status          22.68 kg/m2 96% No Former Smoker        Basic Information     Date Of Birth Sex Race Ethnicity Preferred Language    1955 Female White Non- English      Your appointments     Aug 11, 2017  1:00 PM   Established Patient with ZENON Rice   [facility])    [facility] [facility] [id number] [phone]           You will be receiving a confirmation call a few days before your appointment from our automated call confirmation system.              Problem List              ICD-10-CM Priority Class Noted - Resolved    Epigastric pain R10.13   10/14/2016 - Present    Stress F43.9   10/14/2016 - Present    Gastroesophageal reflux disease without esophagitis K21.9   10/14/2016 - Present    Urinary frequency R35.0   10/14/2016 - Present    Fatigue R53.83   11/15/2016 - Present      Health Maintenance        Date Due Completion Dates    IMM DTaP/Tdap/Td Vaccine (1 - Tdap) 10/24/1975 ---    PAP SMEAR 10/24/1977 ---    MAMMOGRAM 10/24/1996 ---    IMM ZOSTER VACCINE 10/24/2016 ---   " COLONOSCOPY 10/31/2026 10/31/2016            Current Immunizations     Influenza Vaccine Quad Inj (Pf) 11/15/2016  1:00 PM      Below and/or attached are the medications your provider expects you to take. Review all of your home medications and newly ordered medications with your provider and/or pharmacist. Follow medication instructions as directed by your provider and/or pharmacist. Please keep your medication list with you and share with your provider. Update the information when medications are discontinued, doses are changed, or new medications (including over-the-counter products) are added; and carry medication information at all times in the event of emergency situations     Allergies:  CODEINE - Nausea               Medications  Valid as of: February 17, 2017 -  1:57 PM    Generic Name Brand Name Tablet Size Instructions for use    Dicyclomine HCl (Cap) BENTYL 10 MG Take 1 Cap by mouth 4 Times a Day,Before Meals and at Bedtime.        Pantoprazole Sodium (Tablet Delayed Response) PROTONIX 40 MG Take 1 Tab by mouth every day.        Solifenacin Succinate (Tab) VESICARE 10 MG Take 1 Tab by mouth every day.        Sucralfate (Tab) CARAFATE 1 GM Take 1 Tab by mouth 4 Times a Day,Before Meals and at Bedtime.        .                 Medicines prescribed today were sent to:     [facility] [address] [place] [id number]    Phone: [phone] Fax: [phone]    Open 24 Hours?: No      Medication refill instructions:       If your prescription bottle indicates you have medication refills left, it is not necessary to call your provider’s office. Please contact your pharmacy and they will refill your medication.    If your prescription bottle indicates you do not have any refills left, you may request refills at any time through one of the following ways: The online [employer] system (except Urgent Care), by calling your provider’s office, or by asking  your pharmacy to contact your provider’s office with a refill request. Medication refills are processed only during regular business hours and may not be available until the next business day. Your provider may request additional information or to have a follow-up visit with you prior to refilling your medication.   *Please Note: Medication refills are assigned a new Rx number when refilled electronically. Your pharmacy may indicate that no refills were authorized even though a new prescription for the same medication is available at the pharmacy. Please request the medicine by name with the pharmacy before contacting your provider for a refill.        Your To Do List     Future Labs/Procedures Complete By Expires    MA-SCREEN MAMMO W/CAD-BILAT  As directed 2/17/2018    THINPREP PAP WITH HPV  As directed 2/17/2018    VITAMIN D,25 HYDROXY  As directed 2/17/2018      Instructions    Take OTC vitamin D 2000 units daily--will recheck in 6 months    Stay on probiotic    Will call with results of PAP/HPV and any further actions if needed    Follow up in 6 months--have labs before that visit    Mammogram            Food Choices for Gastroesophageal Reflux Disease, Adult  When you have gastroesophageal reflux disease (GERD), the foods you eat and your eating habits are very important. Choosing the right foods can help ease the discomfort of GERD.  WHAT GENERAL GUIDELINES DO I NEED TO FOLLOW?  · Choose fruits, vegetables, whole grains, low-fat dairy products, and low-fat meat, fish, and poultry.  · Limit fats such as oils, salad dressings, butter, nuts, and avocado.  · Keep a food diary to identify foods that cause symptoms.  · Avoid foods that cause reflux. These may be different for different people.  · Eat frequent small meals instead of three large meals each day.  · Eat your meals slowly, in a relaxed setting.  · Limit fried foods.  · Cook foods using methods other than frying.  · Avoid drinking alcohol.  · Avoid  drinking large amounts of liquids with your meals.  · Avoid bending over or lying down until 2-3 hours after eating.  WHAT FOODS ARE NOT RECOMMENDED?  The following are some foods and drinks that may worsen your symptoms:  Vegetables  Tomatoes. Tomato juice. Tomato and spaghetti sauce. Chili peppers. Onion and garlic. Horseradish.  Fruits  Oranges, grapefruit, and lemon (fruit and juice).  Meats  High-fat meats, fish, and poultry. This includes hot dogs, ribs, ham, sausage, salami, and clark.  Dairy  Whole milk and chocolate milk. Sour cream. Cream. Butter. Ice cream. Cream cheese.   Beverages  Coffee and tea, with or without caffeine. Carbonated beverages or energy drinks.  Condiments  Hot sauce. Barbecue sauce.   Sweets/Desserts  Chocolate and cocoa. Donuts. Peppermint and spearmint.  Fats and Oils  High-fat foods, including French fries and potato chips.  Other  Vinegar. Strong spices, such as black pepper, white pepper, red pepper, cayenne, bella powder, cloves, ginger, and chili powder.  The items listed above may not be a complete list of foods and beverages to avoid. Contact your dietitian for more information.     This information is not intended to replace advice given to you by your health care provider. Make sure you discuss any questions you have with your health care provider.     Document Released: 12/18/2006 Document Revised: 01/08/2016 Document Reviewed: 10/22/2014  [employer] Interactive Patient Education ©2016 [employer]    Irritable Bowel Syndrome, Adult  Irritable bowel syndrome (IBS) is not one specific disease. It is a group of symptoms that affects the organs responsible for digestion (gastrointestinal or GI tract).   To regulate how your GI tract works, your body sends signals back and forth between your intestines and your brain. If you have IBS, there may be a problem with these signals. As a result, your GI tract does not function normally. Your intestines may become more sensitive and  overreact to certain things. This is especially true when you eat certain foods or when you are under stress.   There are four types of IBS. These may be determined based on the consistency of your stool:   · IBS with diarrhea.    · IBS with constipation.    · Mixed IBS.    · Unsubtyped IBS.    It is important to know which type of IBS you have. Some treatments are more likely to be helpful for certain types of IBS.   CAUSES   The exact cause of IBS is not known.  RISK FACTORS  You may have a higher risk of IBS if:  · You are a woman.  · You are younger than 45 years old.  · You have a family history of IBS.  · You have mental health problems.  · You have had bacterial infection of your GI tract.  SIGNS AND SYMPTOMS   Symptoms of IBS vary from person to person. The main symptom is abdominal pain or discomfort. Additional symptoms usually include one or more of the following:   · Diarrhea, constipation, or both.    · Abdominal swelling or bloating.    · Feeling full or sick after eating a small or regular-size meal.    · Frequent gas.    · Mucus in the stool.    · A feeling of having more stool left after a bowel movement.    Symptoms tend to come and go. They may be associated with stress, psychiatric conditions, or nothing at all.   DIAGNOSIS   There is no specific test to diagnose IBS. Your health care provider will make a diagnosis based on a physical exam, medical history, and your symptoms. You may have other tests to rule out other conditions that may be causing your symptoms. These may include:   · Blood tests.    · X-rays.    · CT scan.  · Endoscopy and colonoscopy. This is a test in which your GI tract is viewed with a long, thin, flexible tube.  TREATMENT  There is no cure for IBS, but treatment can help relieve symptoms. IBS treatment often includes:   · Changes to your diet, such as:  ¨ Eating more fiber.  ¨ Avoiding foods that cause symptoms.  ¨ Drinking more water.  ¨ Eating regular, medium-sized  portioned meals.  · Medicines. These may include:  ¨ Fiber supplements if you have constipation.  ¨ Medicine to control diarrhea (antidiarrheal medicines).  ¨ Medicine to help control muscle spasms in your GI tract (antispasmodic medicines).  ¨ Medicines to help with any mental health issues, such as antidepressants or tranquilizers.  · Therapy.  ¨ Talk therapy may help with anxiety, depression, or other mental health issues that can make IBS symptoms worse.  · Stress reduction.  ¨ Managing your stress can help keep symptoms under control.  HOME CARE INSTRUCTIONS   · Take medicines only as directed by your health care provider.  · Eat a healthy diet.  ¨ Avoid foods and drinks with added sugar.  ¨ Include more whole grains, fruits, and vegetables gradually into your diet. This may be especially helpful if you have IBS with constipation.  ¨ Avoid any foods and drinks that make your symptoms worse. These may include dairy products and caffeinated or carbonated drinks.  ¨ Do not eat large meals.  ¨ Drink enough fluid to keep your urine clear or pale yellow.  · Exercise regularly. Ask your health care provider for recommendations of good activities for you.  · Keep all follow-up visits as directed by your health care provider. This is important.  SEEK MEDICAL CARE IF:   · You have constant pain.  · You have trouble or pain with swallowing.  · You have worsening diarrhea.  SEEK IMMEDIATE MEDICAL CARE IF:   · You have severe and worsening abdominal pain.    · You have diarrhea and:    ¨ You have a rash, stiff neck, or severe headache.    ¨ You are irritable, sleepy, or difficult to awaken.    ¨ You are weak, dizzy, or extremely thirsty.    · You have bright red blood in your stool or you have black tarry stools.    · You have unusual abdominal swelling that is painful.    · You vomit continuously.    · You vomit blood (hematemesis).    · You have both abdominal pain and a fever.         This information is not intended to  replace advice given to you by your health care provider. Make sure you discuss any questions you have with your health care provider.     Document Released: 12/18/2006 Document Revised: 01/08/2016 Document Reviewed: 09/04/2015  [employer] Interactive Patient Education ©2016 [employer]                  scanRhart Access Code: Activation code not generated  Current [employer] Status: Active           Hemigard Postcare Instructions: The HEMIGARD strips are to remain completely dry for at least 5-7 days.

## 2020-11-03 DIAGNOSIS — E78.2 MIXED HYPERLIPIDEMIA: ICD-10-CM

## 2020-11-03 RX ORDER — SIMVASTATIN 20 MG
TABLET ORAL
Qty: 90 TAB | Refills: 3 | Status: SHIPPED | OUTPATIENT
Start: 2020-11-03 | End: 2021-03-19

## 2020-11-15 DIAGNOSIS — R10.13 EPIGASTRIC PAIN: ICD-10-CM

## 2020-11-15 DIAGNOSIS — K21.9 GASTROESOPHAGEAL REFLUX DISEASE WITHOUT ESOPHAGITIS: ICD-10-CM

## 2020-11-17 RX ORDER — DICYCLOMINE HYDROCHLORIDE 10 MG/1
CAPSULE ORAL
Qty: 60 CAP | Refills: 1 | Status: SHIPPED | OUTPATIENT
Start: 2020-11-17 | End: 2021-01-12

## 2021-01-10 DIAGNOSIS — R10.13 EPIGASTRIC PAIN: ICD-10-CM

## 2021-01-10 DIAGNOSIS — K21.9 GASTROESOPHAGEAL REFLUX DISEASE WITHOUT ESOPHAGITIS: ICD-10-CM

## 2021-01-10 DIAGNOSIS — N95.2 VAGINAL ATROPHY: ICD-10-CM

## 2021-01-12 RX ORDER — DICYCLOMINE HYDROCHLORIDE 10 MG/1
CAPSULE ORAL
Qty: 60 CAP | Refills: 0 | Status: SHIPPED | OUTPATIENT
Start: 2021-01-12 | End: 2021-03-08 | Stop reason: SDUPTHER

## 2021-01-12 RX ORDER — ESTRADIOL 0.1 MG/G
CREAM VAGINAL
Qty: 43 G | Refills: 0 | Status: SHIPPED | OUTPATIENT
Start: 2021-01-12 | End: 2021-03-19

## 2021-02-17 ENCOUNTER — HOSPITAL ENCOUNTER (OUTPATIENT)
Dept: LAB | Facility: MEDICAL CENTER | Age: 66
End: 2021-02-17
Attending: NURSE PRACTITIONER
Payer: COMMERCIAL

## 2021-02-17 DIAGNOSIS — E78.2 MIXED HYPERLIPIDEMIA: ICD-10-CM

## 2021-02-17 DIAGNOSIS — E55.9 VITAMIN D DEFICIENCY: ICD-10-CM

## 2021-02-17 LAB
25(OH)D3 SERPL-MCNC: 60 NG/ML (ref 30–100)
CHOLEST SERPL-MCNC: 168 MG/DL (ref 100–199)
FASTING STATUS PATIENT QL REPORTED: NORMAL
HDLC SERPL-MCNC: 55 MG/DL
LDLC SERPL CALC-MCNC: 75 MG/DL
TRIGL SERPL-MCNC: 189 MG/DL (ref 0–149)

## 2021-02-17 PROCEDURE — 80061 LIPID PANEL: CPT

## 2021-02-17 PROCEDURE — 36415 COLL VENOUS BLD VENIPUNCTURE: CPT

## 2021-02-17 PROCEDURE — 82306 VITAMIN D 25 HYDROXY: CPT

## 2021-02-24 ENCOUNTER — OFFICE VISIT (OUTPATIENT)
Dept: MEDICAL GROUP | Facility: PHYSICIAN GROUP | Age: 66
End: 2021-02-24
Payer: COMMERCIAL

## 2021-02-24 VITALS
HEIGHT: 63 IN | WEIGHT: 153 LBS | OXYGEN SATURATION: 99 % | DIASTOLIC BLOOD PRESSURE: 84 MMHG | HEART RATE: 75 BPM | RESPIRATION RATE: 14 BRPM | TEMPERATURE: 100.2 F | BODY MASS INDEX: 27.11 KG/M2 | SYSTOLIC BLOOD PRESSURE: 124 MMHG

## 2021-02-24 DIAGNOSIS — E78.2 MIXED HYPERLIPIDEMIA: ICD-10-CM

## 2021-02-24 DIAGNOSIS — K21.9 GASTROESOPHAGEAL REFLUX DISEASE WITHOUT ESOPHAGITIS: ICD-10-CM

## 2021-02-24 DIAGNOSIS — Z12.31 ENCOUNTER FOR SCREENING MAMMOGRAM FOR BREAST CANCER: ICD-10-CM

## 2021-02-24 PROCEDURE — 99214 OFFICE O/P EST MOD 30 MIN: CPT | Performed by: NURSE PRACTITIONER

## 2021-02-24 RX ORDER — FAMOTIDINE 40 MG/1
40 TABLET, FILM COATED ORAL 2 TIMES DAILY
Qty: 180 TABLET | Refills: 3 | Status: SHIPPED | OUTPATIENT
Start: 2021-02-24 | End: 2021-09-17 | Stop reason: SDUPTHER

## 2021-02-24 ASSESSMENT — PATIENT HEALTH QUESTIONNAIRE - PHQ9: CLINICAL INTERPRETATION OF PHQ2 SCORE: 0

## 2021-02-24 ASSESSMENT — FIBROSIS 4 INDEX: FIB4 SCORE: 1.52

## 2021-02-24 NOTE — PROGRESS NOTES
Chief Complaint   Patient presents with   • Hyperlipidemia   • Cough     x 2 months        HISTORY OF PRESENT ILLNESS: Patient is a 65 y.o. female established patient who presents today to discuss labs, cough    Mixed hyperlipidemia  The 10-year ASCVD risk score ([place]ara HERNANDEZ Jr., et al., 2013) is: 4.3%  Is on statin, takes simvastatin 20 mg daily  Up to date with labs  Discussed importance of continued healthy lifestyle modifications      Gastroesophageal reflux disease without esophagitis  This is chronic and suboptimally controlled  Is on PPI, but this was better controlled with PPI and H2 blocker  Was on zantac until it was recalled  She has had a cough for the past couple of months, no other reported s/sxs  Denies cough to be productive, no fever or body aches, no sinus pressure, congestion or pain. Not on ACEI  Cough worse at night  She will continue on PPI, but will add H2 blocker, famotidine        Patient Active Problem List    Diagnosis Date Noted   • Second hand smoke exposure 08/26/2020   • Well woman exam with routine gynecological exam 02/26/2020   • Vaginal atrophy 08/15/2019   • Frequent UTI 02/13/2019   • Mixed hyperlipidemia 11/14/2018   • Generalized abdominal pain 11/14/2018   • Dysuria 11/13/2017   • Abnormal TSH 11/13/2017   • Vitamin D deficiency 02/17/2017   • Screening for cervical cancer 02/17/2017   • Elevated ferritin 02/17/2017   • Fatigue 11/15/2016   • Epigastric pain 10/14/2016   • Stress 10/14/2016   • Gastroesophageal reflux disease without esophagitis 10/14/2016   • Urinary frequency 10/14/2016       Allergies:Codeine    Current Outpatient Medications   Medication Sig Dispense Refill   • famotidine (PEPCID) 40 MG Tab Take 1 tablet by mouth 2 times a day. 180 tablet 3   • estradiol (ESTRACE) 0.1 MG/GM vaginal cream INSERT ONE GRAM VAGINALLY ONCE DAILY 43 g 0   • dicyclomine (BENTYL) 10 MG Cap TAKE 1 CAPSULE BY MOUTH 4 TIMES DAILY BEFORE MEAL(S) AND AT BEDTIME 60 Cap 0   • simvastatin  "(ZOCOR) 20 MG Tab TAKE 1 TABLET BY MOUTH ONCE DAILY IN THE EVENING 90 Tab 3   • solifenacin (VESICARE) 10 MG tablet Take 1 Tab by mouth every day. Brand name only--please dispense brand name as pt does not tolerate the generic version of medication, please dispense 90 Tab 3   • pantoprazole (PROTONIX) 40 MG Tablet Delayed Response Take 1 tablet by mouth once daily 90 Tab 3   • vitamin D (CHOLECALCIFEROL) 1000 UNIT Tab Take 1,000 Units by mouth every day.       No current facility-administered medications for this visit.       Social History     Tobacco Use   • Smoking status: Former Smoker     Packs/day: 0.00   • Smokeless tobacco: Never Used   • Tobacco comment: quite 3 years ago   Substance Use Topics   • Alcohol use: No     Alcohol/week: 0.0 oz   • Drug use: No       Family Status   Relation Name Status   • Mo     • Fa       Family History   Problem Relation Age of Onset   • Hypertension Mother    • Diabetes Father    • Heart Attack Father    • Stroke Father        Review of Systems:   Constitutional: Negative for fever, chills, weight loss and malaise/fatigue.   HENT: Negative for ear pain, nosebleeds, congestion, sore throat and neck pain.    Eyes: Negative for blurred vision.   Respiratory: Positive for cough  Cardiovascular: Negative for chest pain, palpitations, orthopnea and leg swelling.   Gastrointestinal: positive per HPI  Genitourinary/Renal: Negative for dysuria, urgency and frequency.   Musculoskeletal: Negative for myalgias, back pain and joint pain.   Skin: Negative for rash and itching.   Neurological: Negative for dizziness, tingling, tremors, sensory change, focal weakness and headaches.     Exam:  /84   Pulse 75   Temp 37.9 °C (100.2 °F) (Temporal)   Resp 14   Ht 1.6 m (5' 3\")   Wt 69.4 kg (153 lb)   SpO2 99%   General:  Well nourished, well developed female in NAD  Skin: warm, dry, intact, no evidence of rash or concerning lesions  Head: is grossly normal.  HEENT: " eyes clear, conjunctiva normal, PERRLA  Pulmonary: Clear to ausculation. Normal effort. No rales, ronchi, or wheezing.  Cardiovascular: Regular rate and rhythm without murmur. Carotid and radial pulses are intact and equal bilaterally.  Extremities: no clubbing, cyanosis, or edema.  Psych/mental: no depression, anxiety, hallucinations  Neuro: alert, intact, CN 2-12 grossly intact    Medical decision-making and discussion:    As mentioned above, will add H2 blocker, she is to continue on PPI. Follow up in 6 months, sooner if needed        Assessment/Plan:  Ruth was seen today for hyperlipidemia and cough.    Diagnoses and all orders for this visit:    Encounter for screening mammogram for breast cancer  -     MA-SCREENING MAMMO BILAT W/CAD; Future    Gastroesophageal reflux disease without esophagitis  -     famotidine (PEPCID) 40 MG Tab; Take 1 tablet by mouth 2 times a day.    Mixed hyperlipidemia         Return in about 6 months (around 8/24/2021) for Follow-up.    Please note that this dictation was created using voice recognition software. I have made every reasonable attempt to correct obvious errors, but I expect that there are errors of grammar and possibly content that I did not discover before finalizing the note.

## 2021-02-24 NOTE — ASSESSMENT & PLAN NOTE
The 10-year ASCVD risk score (Nat HERNANDEZ Jr., et al., 2013) is: 4.3%  Is on statin, takes simvastatin 20 mg daily  Up to date with labs  Discussed importance of continued healthy lifestyle modifications

## 2021-02-24 NOTE — ASSESSMENT & PLAN NOTE
This is chronic and suboptimally controlled  Is on PPI, but this was better controlled with PPI and H2 blocker  Was on zantac until it was recalled  She has had a cough for the past couple of months, no other reported s/sxs  Denies cough to be productive, no fever or body aches, no sinus pressure, congestion or pain. Not on ACEI  Cough worse at night  She will continue on PPI, but will add H2 blocker, famotidine

## 2021-03-08 DIAGNOSIS — R10.13 EPIGASTRIC PAIN: ICD-10-CM

## 2021-03-08 DIAGNOSIS — K21.9 GASTROESOPHAGEAL REFLUX DISEASE WITHOUT ESOPHAGITIS: ICD-10-CM

## 2021-03-09 RX ORDER — DICYCLOMINE HYDROCHLORIDE 10 MG/1
CAPSULE ORAL
Qty: 120 CAPSULE | Refills: 1 | Status: SHIPPED | OUTPATIENT
Start: 2021-03-09 | End: 2021-04-20 | Stop reason: SDUPTHER

## 2021-03-09 NOTE — TELEPHONE ENCOUNTER
Received request via: Pharmacy    Was the patient seen in the last year in this department? Yes    Does the patient have an active prescription (recently filled or refills available) for medication(s) requested? No     Last OV 2/21/2021

## 2021-03-18 DIAGNOSIS — N95.2 VAGINAL ATROPHY: ICD-10-CM

## 2021-03-18 DIAGNOSIS — E78.2 MIXED HYPERLIPIDEMIA: ICD-10-CM

## 2021-03-19 RX ORDER — SIMVASTATIN 20 MG
TABLET ORAL
Qty: 90 TABLET | Refills: 0 | Status: SHIPPED | OUTPATIENT
Start: 2021-03-19 | End: 2021-06-21

## 2021-03-19 RX ORDER — ESTRADIOL 0.1 MG/G
CREAM VAGINAL
Qty: 43 G | Refills: 0 | Status: SHIPPED
Start: 2021-03-19 | End: 2021-04-19

## 2021-04-20 DIAGNOSIS — K21.9 GASTROESOPHAGEAL REFLUX DISEASE WITHOUT ESOPHAGITIS: ICD-10-CM

## 2021-04-20 DIAGNOSIS — R10.13 EPIGASTRIC PAIN: ICD-10-CM

## 2021-04-20 RX ORDER — DICYCLOMINE HYDROCHLORIDE 10 MG/1
CAPSULE ORAL
Qty: 120 CAPSULE | Refills: 1 | Status: SHIPPED | OUTPATIENT
Start: 2021-04-20 | End: 2021-08-24

## 2021-04-20 NOTE — TELEPHONE ENCOUNTER
Was the patient seen in the last year in this department? Yes    Does patient have an active prescription for medications requested? No     Received Request Via: Pharmacy      Pt met protocol?: Yes, OV 2/21, out in the next couple weeks

## 2021-06-16 DIAGNOSIS — E78.2 MIXED HYPERLIPIDEMIA: ICD-10-CM

## 2021-06-21 RX ORDER — SIMVASTATIN 20 MG
TABLET ORAL
Qty: 90 TABLET | Refills: 1 | Status: SHIPPED | OUTPATIENT
Start: 2021-06-21 | End: 2021-08-24

## 2021-06-21 NOTE — TELEPHONE ENCOUNTER
Pt has had OV within the 12 month protocol and lipid panel is current. 6 month supply sent to pharmacy.   Lab Results   Component Value Date/Time    CHOLSTRLTOT 168 02/17/2021 12:59 PM    LDL 75 02/17/2021 12:59 PM    HDL 55 02/17/2021 12:59 PM    TRIGLYCERIDE 189 (H) 02/17/2021 12:59 PM       Lab Results   Component Value Date/Time    SODIUM 139 08/20/2020 03:18 PM    POTASSIUM 3.7 08/20/2020 03:18 PM    CHLORIDE 102 08/20/2020 03:18 PM    CO2 22 08/20/2020 03:18 PM    GLUCOSE 88 08/20/2020 03:18 PM    BUN 15 08/20/2020 03:18 PM    CREATININE 0.79 08/20/2020 03:18 PM     Lab Results   Component Value Date/Time    ALKPHOSPHAT 71 08/20/2020 03:18 PM    ASTSGOT 19 08/20/2020 03:18 PM    ALTSGPT 24 08/20/2020 03:18 PM    TBILIRUBIN 0.6 08/20/2020 03:18 PM

## 2021-07-22 DIAGNOSIS — K21.9 GASTROESOPHAGEAL REFLUX DISEASE WITHOUT ESOPHAGITIS: ICD-10-CM

## 2021-07-23 RX ORDER — PANTOPRAZOLE SODIUM 40 MG/1
TABLET, DELAYED RELEASE ORAL
Qty: 90 TABLET | Refills: 0 | Status: SHIPPED | OUTPATIENT
Start: 2021-07-23 | End: 2021-08-24

## 2021-08-23 DIAGNOSIS — E78.2 MIXED HYPERLIPIDEMIA: ICD-10-CM

## 2021-08-23 DIAGNOSIS — K21.9 GASTROESOPHAGEAL REFLUX DISEASE WITHOUT ESOPHAGITIS: ICD-10-CM

## 2021-08-23 DIAGNOSIS — R10.13 EPIGASTRIC PAIN: ICD-10-CM

## 2021-08-24 RX ORDER — PANTOPRAZOLE SODIUM 40 MG/1
TABLET, DELAYED RELEASE ORAL
Qty: 90 TABLET | Refills: 0 | Status: SHIPPED | OUTPATIENT
Start: 2021-08-24 | End: 2021-10-26

## 2021-08-24 RX ORDER — SIMVASTATIN 20 MG
TABLET ORAL
Qty: 90 TABLET | Refills: 0 | Status: SHIPPED | OUTPATIENT
Start: 2021-08-24 | End: 2021-10-26

## 2021-08-24 RX ORDER — DICYCLOMINE HYDROCHLORIDE 10 MG/1
CAPSULE ORAL
Qty: 120 CAPSULE | Refills: 0 | Status: SHIPPED | OUTPATIENT
Start: 2021-08-24 | End: 2021-09-16

## 2021-09-16 DIAGNOSIS — N95.2 VAGINAL ATROPHY: ICD-10-CM

## 2021-09-16 DIAGNOSIS — K21.9 GASTROESOPHAGEAL REFLUX DISEASE WITHOUT ESOPHAGITIS: ICD-10-CM

## 2021-09-16 DIAGNOSIS — R10.13 EPIGASTRIC PAIN: ICD-10-CM

## 2021-09-16 NOTE — TELEPHONE ENCOUNTER
Received request via: Pharmacy    Was the patient seen in the last year in this department? Yes    Does the patient have an 2/24/21ctive prescription (recently filled or refills available) for medication(s) requested? No

## 2021-09-17 DIAGNOSIS — R10.13 EPIGASTRIC PAIN: ICD-10-CM

## 2021-09-17 DIAGNOSIS — N95.2 VAGINAL ATROPHY: ICD-10-CM

## 2021-09-17 DIAGNOSIS — K21.9 GASTROESOPHAGEAL REFLUX DISEASE WITHOUT ESOPHAGITIS: ICD-10-CM

## 2021-09-17 RX ORDER — DICYCLOMINE HYDROCHLORIDE 10 MG/1
CAPSULE ORAL
Qty: 120 CAPSULE | Refills: 1 | Status: SHIPPED | OUTPATIENT
Start: 2021-09-17 | End: 2021-11-24

## 2021-09-17 RX ORDER — ESTRADIOL 0.1 MG/G
CREAM VAGINAL
Qty: 43 G | Refills: 1 | Status: SHIPPED | OUTPATIENT
Start: 2021-09-17 | End: 2021-11-24

## 2021-09-20 RX ORDER — FAMOTIDINE 40 MG/1
40 TABLET, FILM COATED ORAL 2 TIMES DAILY
Qty: 180 TABLET | Refills: 1 | Status: SHIPPED | OUTPATIENT
Start: 2021-09-20 | End: 2021-12-29 | Stop reason: SDUPTHER

## 2021-09-20 RX ORDER — PANTOPRAZOLE SODIUM 40 MG/1
40 TABLET, DELAYED RELEASE ORAL
Qty: 90 TABLET | Refills: 0 | Status: CANCELLED | OUTPATIENT
Start: 2021-09-20

## 2021-09-20 RX ORDER — ESTRADIOL 0.1 MG/G
CREAM VAGINAL
Qty: 43 G | Refills: 1 | Status: CANCELLED | OUTPATIENT
Start: 2021-09-20

## 2021-09-20 RX ORDER — DICYCLOMINE HYDROCHLORIDE 10 MG/1
CAPSULE ORAL
Qty: 120 CAPSULE | Refills: 1 | Status: CANCELLED | OUTPATIENT
Start: 2021-09-20

## 2021-09-20 NOTE — TELEPHONE ENCOUNTER
.Received request via: Pharmacy    Was the patient seen in the last year in this department? Yes  2/24/21  Does the patient have an active prescription (recently filled or refills available) for medication(s) requested? No

## 2021-10-23 DIAGNOSIS — K21.9 GASTROESOPHAGEAL REFLUX DISEASE WITHOUT ESOPHAGITIS: ICD-10-CM

## 2021-10-23 DIAGNOSIS — E78.2 MIXED HYPERLIPIDEMIA: ICD-10-CM

## 2021-10-26 RX ORDER — SIMVASTATIN 20 MG
TABLET ORAL
Qty: 90 TABLET | Refills: 1 | Status: SHIPPED | OUTPATIENT
Start: 2021-10-26 | End: 2021-12-29 | Stop reason: SDUPTHER

## 2021-10-26 RX ORDER — PANTOPRAZOLE SODIUM 40 MG/1
TABLET, DELAYED RELEASE ORAL
Qty: 90 TABLET | Refills: 1 | Status: SHIPPED | OUTPATIENT
Start: 2021-10-26 | End: 2021-12-29 | Stop reason: SDUPTHER

## 2021-11-09 ENCOUNTER — HOSPITAL ENCOUNTER (OUTPATIENT)
Dept: LAB | Facility: MEDICAL CENTER | Age: 66
End: 2021-11-09
Attending: NURSE PRACTITIONER
Payer: MEDICARE

## 2021-11-09 ENCOUNTER — OFFICE VISIT (OUTPATIENT)
Dept: MEDICAL GROUP | Facility: PHYSICIAN GROUP | Age: 66
End: 2021-11-09
Payer: MEDICARE

## 2021-11-09 VITALS
WEIGHT: 158.2 LBS | RESPIRATION RATE: 16 BRPM | DIASTOLIC BLOOD PRESSURE: 100 MMHG | OXYGEN SATURATION: 94 % | HEIGHT: 61 IN | BODY MASS INDEX: 29.87 KG/M2 | TEMPERATURE: 97.5 F | HEART RATE: 105 BPM | SYSTOLIC BLOOD PRESSURE: 150 MMHG

## 2021-11-09 DIAGNOSIS — Z13.0 ENCOUNTER FOR SCREENING FOR HEMATOLOGIC DISORDER: ICD-10-CM

## 2021-11-09 DIAGNOSIS — E55.9 VITAMIN D DEFICIENCY: ICD-10-CM

## 2021-11-09 DIAGNOSIS — M25.511 ACUTE PAIN OF RIGHT SHOULDER: ICD-10-CM

## 2021-11-09 DIAGNOSIS — I10 PRIMARY HYPERTENSION: ICD-10-CM

## 2021-11-09 DIAGNOSIS — Z23 NEED FOR INFLUENZA VACCINATION: ICD-10-CM

## 2021-11-09 DIAGNOSIS — E78.2 MIXED HYPERLIPIDEMIA: ICD-10-CM

## 2021-11-09 DIAGNOSIS — R07.89 ATYPICAL CHEST PAIN: ICD-10-CM

## 2021-11-09 DIAGNOSIS — Z12.31 ENCOUNTER FOR SCREENING MAMMOGRAM FOR BREAST CANCER: ICD-10-CM

## 2021-11-09 LAB
ALBUMIN SERPL BCP-MCNC: 5 G/DL (ref 3.2–4.9)
ALBUMIN/GLOB SERPL: 1.9 G/DL
ALP SERPL-CCNC: 82 U/L (ref 30–99)
ALT SERPL-CCNC: 61 U/L (ref 2–50)
ANION GAP SERPL CALC-SCNC: 11 MMOL/L (ref 7–16)
AST SERPL-CCNC: 34 U/L (ref 12–45)
BASOPHILS # BLD AUTO: 0.6 % (ref 0–1.8)
BASOPHILS # BLD: 0.04 K/UL (ref 0–0.12)
BILIRUB SERPL-MCNC: 0.6 MG/DL (ref 0.1–1.5)
BUN SERPL-MCNC: 13 MG/DL (ref 8–22)
CALCIUM SERPL-MCNC: 9.5 MG/DL (ref 8.5–10.5)
CHLORIDE SERPL-SCNC: 103 MMOL/L (ref 96–112)
CHOLEST SERPL-MCNC: 149 MG/DL (ref 100–199)
CO2 SERPL-SCNC: 24 MMOL/L (ref 20–33)
CREAT SERPL-MCNC: 0.82 MG/DL (ref 0.5–1.4)
EOSINOPHIL # BLD AUTO: 0.14 K/UL (ref 0–0.51)
EOSINOPHIL NFR BLD: 2.2 % (ref 0–6.9)
ERYTHROCYTE [DISTWIDTH] IN BLOOD BY AUTOMATED COUNT: 41 FL (ref 35.9–50)
FASTING STATUS PATIENT QL REPORTED: NORMAL
GLOBULIN SER CALC-MCNC: 2.6 G/DL (ref 1.9–3.5)
GLUCOSE SERPL-MCNC: 98 MG/DL (ref 65–99)
HCT VFR BLD AUTO: 45.3 % (ref 37–47)
HDLC SERPL-MCNC: 52 MG/DL
HGB BLD-MCNC: 15 G/DL (ref 12–16)
IMM GRANULOCYTES # BLD AUTO: 0.02 K/UL (ref 0–0.11)
IMM GRANULOCYTES NFR BLD AUTO: 0.3 % (ref 0–0.9)
LDLC SERPL CALC-MCNC: 66 MG/DL
LYMPHOCYTES # BLD AUTO: 2.01 K/UL (ref 1–4.8)
LYMPHOCYTES NFR BLD: 31 % (ref 22–41)
MCH RBC QN AUTO: 30.2 PG (ref 27–33)
MCHC RBC AUTO-ENTMCNC: 33.1 G/DL (ref 33.6–35)
MCV RBC AUTO: 91.3 FL (ref 81.4–97.8)
MONOCYTES # BLD AUTO: 0.53 K/UL (ref 0–0.85)
MONOCYTES NFR BLD AUTO: 8.2 % (ref 0–13.4)
NEUTROPHILS # BLD AUTO: 3.75 K/UL (ref 2–7.15)
NEUTROPHILS NFR BLD: 57.7 % (ref 44–72)
NRBC # BLD AUTO: 0 K/UL
NRBC BLD-RTO: 0 /100 WBC
PLATELET # BLD AUTO: 190 K/UL (ref 164–446)
PMV BLD AUTO: 12.4 FL (ref 9–12.9)
POTASSIUM SERPL-SCNC: 4.1 MMOL/L (ref 3.6–5.5)
PROT SERPL-MCNC: 7.6 G/DL (ref 6–8.2)
RBC # BLD AUTO: 4.96 M/UL (ref 4.2–5.4)
SODIUM SERPL-SCNC: 138 MMOL/L (ref 135–145)
TRIGL SERPL-MCNC: 153 MG/DL (ref 0–149)
WBC # BLD AUTO: 6.5 K/UL (ref 4.8–10.8)

## 2021-11-09 PROCEDURE — 80053 COMPREHEN METABOLIC PANEL: CPT

## 2021-11-09 PROCEDURE — 93000 ELECTROCARDIOGRAM COMPLETE: CPT | Performed by: NURSE PRACTITIONER

## 2021-11-09 PROCEDURE — G0008 ADMIN INFLUENZA VIRUS VAC: HCPCS | Performed by: NURSE PRACTITIONER

## 2021-11-09 PROCEDURE — 80061 LIPID PANEL: CPT

## 2021-11-09 PROCEDURE — 85025 COMPLETE CBC W/AUTO DIFF WBC: CPT

## 2021-11-09 PROCEDURE — 90662 IIV NO PRSV INCREASED AG IM: CPT | Performed by: NURSE PRACTITIONER

## 2021-11-09 PROCEDURE — 82306 VITAMIN D 25 HYDROXY: CPT

## 2021-11-09 PROCEDURE — 36415 COLL VENOUS BLD VENIPUNCTURE: CPT

## 2021-11-09 PROCEDURE — 99213 OFFICE O/P EST LOW 20 MIN: CPT | Mod: 25 | Performed by: NURSE PRACTITIONER

## 2021-11-09 RX ORDER — IBUPROFEN 800 MG/1
800 TABLET ORAL EVERY 8 HOURS PRN
Qty: 90 TABLET | Refills: 0 | Status: SHIPPED | OUTPATIENT
Start: 2021-11-09 | End: 2021-11-17

## 2021-11-09 RX ORDER — LOSARTAN POTASSIUM 50 MG/1
50 TABLET ORAL DAILY
Qty: 90 TABLET | Refills: 3 | Status: SHIPPED | OUTPATIENT
Start: 2021-11-09 | End: 2021-12-29 | Stop reason: SDUPTHER

## 2021-11-09 ASSESSMENT — FIBROSIS 4 INDEX: FIB4 SCORE: 1.55

## 2021-11-09 NOTE — PROGRESS NOTES
Chief Complaint   Patient presents with   • Follow-Up       HISTORY OF PRESENT ILLNESS: Patient is a 66 y.o. female established patient who presents today to follow up, meds, labs, health maintenance    Atypical chest pain  This started 5-6 months ago  Location is randomized  Comes and goes, last only a few a seconds  Not associated with activity  Denies associated SOB, syncope, diaphoresis  Pain not reproducible  In office EKG reassuring, normal with no evidence of ischemia or arrhythmia  Discussed the importance of ER precautions especially if she develops associated symptoms including shortness of breath, diaphoresis or syncope           Vitamin D deficiency  Patient has history of vitamin D deficiency, takes over-the-counter vitamin D supplement  Due for labs, she will have done today    Primary hypertension  This is a new diagnosis  Pressure today in office 150/100, at the end of the visit blood pressure was not much improved  She has a family history of hypertension  Denies symptoms associated with this but does agree to starting antihypertensive  Due for labs, she will have done today as she is fasting  Agrees to starting losartan 50 mg daily, discussed with her the risks, benefits and side effects associated with losartan  She will return in 1 week with MA to have blood pressure rechecked    Mixed hyperlipidemia  The 10-year ASCVD risk score ([place] DC Jr., et al., 2013) is: 9.3%  Patient is appropriately on statin  Due for labs, will have done today since she is fasting  Discussed the importance of ongoing healthy lifestyle modifications    Acute pain of right shoulder  Patient has acute pain of her right shoulder not related to any particular injury that she is aware of  She does have full range of motion  Has mild tenderness posterior to the bicep  Discussed various causes, likely tendinitis  Discussed the importance of supportive care including ice and/or heat, gentle range of motion exercises and  stretches, over-the-counter analgesics  We will prescribe ibuprofen 800 mg 1 pill up to 3 times a day as needed with food  Advised to follow-up if symptoms do not improve over the next 3 to 4 weeks      Patient Active Problem List    Diagnosis Date Noted   • Atypical chest pain 11/09/2021   • Acute pain of right shoulder 11/09/2021   • Primary hypertension 11/09/2021   • Second hand smoke exposure 08/26/2020   • Well woman exam with routine gynecological exam 02/26/2020   • Vaginal atrophy 08/15/2019   • Frequent UTI 02/13/2019   • Mixed hyperlipidemia 11/14/2018   • Generalized abdominal pain 11/14/2018   • Dysuria 11/13/2017   • Abnormal TSH 11/13/2017   • Vitamin D deficiency 02/17/2017   • Screening for cervical cancer 02/17/2017   • Elevated ferritin 02/17/2017   • Fatigue 11/15/2016   • Epigastric pain 10/14/2016   • Stress 10/14/2016   • Gastroesophageal reflux disease without esophagitis 10/14/2016   • Urinary frequency 10/14/2016       Allergies:Codeine    Current Outpatient Medications   Medication Sig Dispense Refill   • ibuprofen (MOTRIN) 800 MG Tab Take 1 Tablet by mouth every 8 hours as needed. 90 Tablet 0   • losartan (COZAAR) 50 MG Tab Take 1 Tablet by mouth every day. 90 Tablet 3   • simvastatin (ZOCOR) 20 MG Tab TAKE 1 TABLET BY MOUTH ONCE DAILY IN THE EVENING 90 Tablet 1   • pantoprazole (PROTONIX) 40 MG Tablet Delayed Response Take 1 tablet by mouth once daily 90 Tablet 1   • famotidine (PEPCID) 40 MG Tab Take 1 Tablet by mouth 2 times a day. 180 Tablet 1   • dicyclomine (BENTYL) 10 MG Cap TAKE 1 CAPSULE BY MOUTH 4 TIMES DAILY BEFORE MEAL(S) AND AT BEDTIME 120 Capsule 1   • estradiol (ESTRACE) 0.1 MG/GM vaginal cream INSERT 1 GRAM VAGINALLY ONCE DAILY 43 g 1   • solifenacin (VESICARE) 10 MG tablet Take 1 Tab by mouth every day. Brand name only--please dispense brand name as pt does not tolerate the generic version of medication, please dispense 90 Tab 3   • vitamin D (CHOLECALCIFEROL) 1000 UNIT  "Tab Take 1,000 Units by mouth every day.       No current facility-administered medications for this visit.       Social History     Tobacco Use   • Smoking status: Former Smoker     Packs/day: 0.00   • Smokeless tobacco: Never Used   • Tobacco comment: quite 3 years ago   Substance Use Topics   • Alcohol use: No     Alcohol/week: 0.0 oz   • Drug use: No       Family Status   Relation Name Status   • Mo     • Fa       Family History   Problem Relation Age of Onset   • Hypertension Mother    • Diabetes Father    • Heart Attack Father    • Stroke Father        Review of Systems:   Constitutional: Negative for fever, chills, weight loss and malaise/fatigue.   Respiratory: Negative for cough, sputum production, shortness of breath and wheezing.    Cardiovascular: Positive per HPI  Gastrointestinal: Negative for heartburn, nausea, vomiting and abdominal pain.   Genitourinary/Renal: Negative for dysuria, urgency and frequency.   Musculoskeletal: Positive per HPI  Neurological: Negative for dizziness, tingling, tremors, sensory change, focal weakness and headaches.   Endo/Heme/Allergies: Does not bruise/bleed easily.       Exam:  /100 (BP Location: Left arm)   Pulse (!) 105   Temp 36.4 °C (97.5 °F) (Temporal)   Resp 16   Ht 1.549 m (5' 1\")   Wt 71.8 kg (158 lb 3.2 oz)   SpO2 94%   General:  Well nourished, well developed female in NAD  Skin: warm, dry, intact, no evidence of rash or concerning lesions  Head: is grossly normal.  HEENT: eyes clear, conjunctiva normal, PERRLA  Pulmonary: Clear to ausculation. Normal effort. No rales, ronchi, or wheezing.  Cardiovascular: Regular rate and rhythm without murmur. Carotid and radial pulses are intact and equal bilaterally.  Abdomen: soft, non-tender, positive bowel sounds  Musculoskeletal: no clubbing, cyanosis, or edema.  Normal range of motion to right upper extremity/shoulder.  Positive for mild TTP to posterior aspect of right bicep.  Psych/mental: " no depression, anxiety, hallucinations  Neuro: alert, intact, CN 2-12 grossly intact      Medical decision-making and discussion:        Assessment/Plan:      1. Atypical chest pain  Discussed the importance of ER precautions however suspicion for cardiac cause of symptoms very low  Assured patient EKG was normal    - EKG - Clinic Performed    2. Primary hypertension  -take meds as prescribed and call for refills when needed  -monitor BP at home if warranted  -have labs done when due/ordered  -continue with healthy lifestyle modifications    - losartan (COZAAR) 50 MG Tab; Take 1 Tablet by mouth every day.  Dispense: 90 Tablet; Refill: 3    3. Mixed hyperlipidemia  -take meds as prescribed and call for refills when needed  -have labs done when due/ordered  -continue with healthy lifestyle modifications    - Comp Metabolic Panel; Future  - Lipid Profile; Future    4. Vitamin D deficiency    - VITAMIN D,25 HYDROXY; Future    5. Acute pain of right shoulder  Discussed the importance of supportive measures  Gentle range of motion exercises/stretches, ice or heat, ibuprofen  Follow-up if symptoms not improve over the next 3 to 4 weeks  - ibuprofen (MOTRIN) 800 MG Tab; Take 1 Tablet by mouth every 8 hours as needed.  Dispense: 90 Tablet; Refill: 0    6. Encounter for screening for hematologic disorder    - CBC WITH DIFFERENTIAL; Future    7. Need for influenza vaccination    - INFLUENZA VACCINE, HIGH DOSE (65+ ONLY)    8. Encounter for screening mammogram for breast cancer    - MA-SCREENING MAMMO BILAT W/CAD; Future         Return for As needed.        Please note that this dictation was created using voice recognition software. I have made every reasonable attempt to correct obvious errors, but I expect that there are errors of grammar and possibly content that I did not discover before finalizing the note.

## 2021-11-09 NOTE — ASSESSMENT & PLAN NOTE
This started 5-6 months ago  Location is randomized  Comes and goes, last only a few a seconds  Not associated with activity  Denies associated SOB, syncope, diaphoresis  Pain not reproducible  In office EKG reassuring, normal with no evidence of ischemia or arrhythmia  Discussed the importance of ER precautions especially if she develops associated symptoms including shortness of breath, diaphoresis or syncope

## 2021-11-10 NOTE — ASSESSMENT & PLAN NOTE
The 10-year ASCVD risk score ([place] HERNANDEZ Jr., et al., 2013) is: 9.3%  Patient is appropriately on statin  Due for labs, will have done today since she is fasting  Discussed the importance of ongoing healthy lifestyle modifications

## 2021-11-10 NOTE — ASSESSMENT & PLAN NOTE
Patient has acute pain of her right shoulder not related to any particular injury that she is aware of  She does have full range of motion  Has mild tenderness posterior to the bicep  Discussed various causes, likely tendinitis  Discussed the importance of supportive care including ice and/or heat, gentle range of motion exercises and stretches, over-the-counter analgesics  We will prescribe ibuprofen 800 mg 1 pill up to 3 times a day as needed with food  Advised to follow-up if symptoms do not improve over the next 3 to 4 weeks

## 2021-11-10 NOTE — ASSESSMENT & PLAN NOTE
Patient has history of vitamin D deficiency, takes over-the-counter vitamin D supplement  Due for labs, she will have done today

## 2021-11-10 NOTE — ASSESSMENT & PLAN NOTE
This is a new diagnosis  Pressure today in office 150/100, at the end of the visit blood pressure was not much improved  She has a family history of hypertension  Denies symptoms associated with this but does agree to starting antihypertensive  Due for labs, she will have done today as she is fasting  Agrees to starting losartan 50 mg daily, discussed with her the risks, benefits and side effects associated with losartan  She will return in 1 week with MA to have blood pressure rechecked

## 2021-11-12 LAB — 25(OH)D3 SERPL-MCNC: 49 NG/ML (ref 30–80)

## 2021-11-16 ENCOUNTER — NON-PROVIDER VISIT (OUTPATIENT)
Dept: MEDICAL GROUP | Facility: PHYSICIAN GROUP | Age: 66
End: 2021-11-16
Payer: COMMERCIAL

## 2021-11-16 VITALS — DIASTOLIC BLOOD PRESSURE: 86 MMHG | SYSTOLIC BLOOD PRESSURE: 138 MMHG

## 2021-11-16 NOTE — NON-PROVIDER
Ruth Shoemaker is a 66 y.o. female here for a non-provider visit for bp CHECK     Vitals:    11/16/21 1321   BP: 138/86   BP Location: Left arm   Patient Position: Sitting   BP Cuff Size: Large adult     If abnormal, was the Registered Nurse (office provider if RN is unavailable) notified today? Yes    Routed to PCP? Yes

## 2021-11-17 ENCOUNTER — OFFICE VISIT (OUTPATIENT)
Dept: MEDICAL GROUP | Facility: PHYSICIAN GROUP | Age: 66
End: 2021-11-17
Payer: MEDICARE

## 2021-11-17 ENCOUNTER — APPOINTMENT (OUTPATIENT)
Dept: RADIOLOGY | Facility: IMAGING CENTER | Age: 66
End: 2021-11-17
Attending: NURSE PRACTITIONER
Payer: MEDICARE

## 2021-11-17 VITALS
SYSTOLIC BLOOD PRESSURE: 140 MMHG | DIASTOLIC BLOOD PRESSURE: 82 MMHG | WEIGHT: 150.8 LBS | TEMPERATURE: 97.9 F | BODY MASS INDEX: 28.47 KG/M2 | HEART RATE: 105 BPM | RESPIRATION RATE: 16 BRPM | HEIGHT: 61 IN | OXYGEN SATURATION: 95 %

## 2021-11-17 DIAGNOSIS — R20.2 NUMBNESS AND TINGLING IN RIGHT HAND: ICD-10-CM

## 2021-11-17 DIAGNOSIS — R20.0 NUMBNESS AND TINGLING IN RIGHT HAND: ICD-10-CM

## 2021-11-17 DIAGNOSIS — I10 PRIMARY HYPERTENSION: ICD-10-CM

## 2021-11-17 DIAGNOSIS — K62.5 RECTAL BLEEDING: ICD-10-CM

## 2021-11-17 PROCEDURE — 99213 OFFICE O/P EST LOW 20 MIN: CPT | Performed by: NURSE PRACTITIONER

## 2021-11-17 PROCEDURE — 72040 X-RAY EXAM NECK SPINE 2-3 VW: CPT | Mod: TC,FY | Performed by: FAMILY MEDICINE

## 2021-11-17 ASSESSMENT — FIBROSIS 4 INDEX: FIB4 SCORE: 1.49

## 2021-11-17 NOTE — PATIENT INSTRUCTIONS
Consider using OTC hemorrhoid meds--preparation H or anusol suppositories    Go ahead and start BP meds, can take in the evening if you would like    Will get cervical spine x-ray

## 2021-11-17 NOTE — ASSESSMENT & PLAN NOTE
This past Monday had diarrhea, later in the day noticed blood on toilet paper, this was bright red  She has had this in the past and when she presented to the emergency room, no cause found, she is up-to-date with colonoscopy, this was well within normal limits  She does state that she had lower abdominal pain prior to having diarrhea, this has since resolved  She had bright red blood per toilet paper for 2 days and today this has resolved  She does agree to over-the-counter hemorrhoidal treatment as this is likely the cause of her symptoms, this includes over-the-counter Preparation H or Anusol suppositories  Would like her to come back for close follow-up at least in the next 6 to 8 weeks  Discussed various causes of constipation, rectal bleeding and discussed the importance of preventative measures

## 2021-11-17 NOTE — ASSESSMENT & PLAN NOTE
Does notPatient was seen last week for right shoulder pain, she states now she has numbness and tingling in the tips of her fingers of the right hand  Reports cervical spine pain but does agree to cervical spine x-ray, she will be notified of results and further actions if needed

## 2021-11-17 NOTE — ASSESSMENT & PLAN NOTE
Patient here for follow-up of hypertension, was last seen November 9 and started on losartan 50 mg daily  She unfortunately did not start medication due to other concerns (see additional notes)  However she come in this past Monday for a blood pressure check in which it was well within normal limits  But today her blood pressure mildly elevated at 140/82  She does agree to starting the medication, discussed that she can take it anytime of the day  She is concerned as pharmacist labeled her bottle as possibly causing sedation  Patient provided reassurance, advised to start taking and she can take in the evening if she would like

## 2021-12-29 ENCOUNTER — OFFICE VISIT (OUTPATIENT)
Dept: MEDICAL GROUP | Facility: PHYSICIAN GROUP | Age: 66
End: 2021-12-29
Payer: MEDICARE

## 2021-12-29 VITALS
OXYGEN SATURATION: 97 % | DIASTOLIC BLOOD PRESSURE: 82 MMHG | HEIGHT: 61 IN | SYSTOLIC BLOOD PRESSURE: 150 MMHG | WEIGHT: 157 LBS | TEMPERATURE: 97.6 F | HEART RATE: 102 BPM | BODY MASS INDEX: 29.64 KG/M2

## 2021-12-29 DIAGNOSIS — R79.89 ELEVATED LFTS: ICD-10-CM

## 2021-12-29 DIAGNOSIS — Z23 NEED FOR VACCINATION: ICD-10-CM

## 2021-12-29 DIAGNOSIS — I10 PRIMARY HYPERTENSION: ICD-10-CM

## 2021-12-29 DIAGNOSIS — E78.2 MIXED HYPERLIPIDEMIA: ICD-10-CM

## 2021-12-29 DIAGNOSIS — Z78.0 MENOPAUSE: ICD-10-CM

## 2021-12-29 DIAGNOSIS — K21.9 GASTROESOPHAGEAL REFLUX DISEASE WITHOUT ESOPHAGITIS: ICD-10-CM

## 2021-12-29 DIAGNOSIS — N95.2 VAGINAL ATROPHY: ICD-10-CM

## 2021-12-29 DIAGNOSIS — R10.13 EPIGASTRIC PAIN: ICD-10-CM

## 2021-12-29 PROCEDURE — 90670 PCV13 VACCINE IM: CPT | Performed by: FAMILY MEDICINE

## 2021-12-29 PROCEDURE — G0009 ADMIN PNEUMOCOCCAL VACCINE: HCPCS | Performed by: FAMILY MEDICINE

## 2021-12-29 PROCEDURE — 99214 OFFICE O/P EST MOD 30 MIN: CPT | Mod: 25 | Performed by: FAMILY MEDICINE

## 2021-12-29 RX ORDER — ESTRADIOL 0.1 MG/G
CREAM VAGINAL
Qty: 43 G | Refills: 3 | Status: SHIPPED | OUTPATIENT
Start: 2021-12-29 | End: 2023-04-18 | Stop reason: SDUPTHER

## 2021-12-29 RX ORDER — DICYCLOMINE HYDROCHLORIDE 10 MG/1
CAPSULE ORAL
Qty: 120 CAPSULE | Refills: 3 | Status: SHIPPED | OUTPATIENT
Start: 2021-12-29 | End: 2023-04-18 | Stop reason: SDUPTHER

## 2021-12-29 RX ORDER — PANTOPRAZOLE SODIUM 40 MG/1
40 TABLET, DELAYED RELEASE ORAL DAILY
Qty: 90 TABLET | Refills: 3 | Status: SHIPPED | OUTPATIENT
Start: 2021-12-29 | End: 2022-09-06

## 2021-12-29 RX ORDER — LOSARTAN POTASSIUM 50 MG/1
50 TABLET ORAL DAILY
Qty: 90 TABLET | Refills: 3 | Status: SHIPPED | OUTPATIENT
Start: 2021-12-29 | End: 2022-01-13 | Stop reason: SDUPTHER

## 2021-12-29 RX ORDER — FAMOTIDINE 40 MG/1
40 TABLET, FILM COATED ORAL 2 TIMES DAILY
Qty: 180 TABLET | Refills: 3 | Status: SHIPPED | OUTPATIENT
Start: 2021-12-29 | End: 2022-09-06

## 2021-12-29 RX ORDER — SIMVASTATIN 20 MG
20 TABLET ORAL EVERY EVENING
Qty: 90 TABLET | Refills: 3 | Status: SHIPPED | OUTPATIENT
Start: 2021-12-29 | End: 2022-09-06

## 2021-12-29 ASSESSMENT — FIBROSIS 4 INDEX: FIB4 SCORE: 1.49

## 2021-12-29 NOTE — PROGRESS NOTES
"Subjective:   Ruth Shoemaker is a 66 y.o. female here today for evaluation and management of:     Primary hypertension  On losartan 50 mg  BP still elevated 150/82 Hr 102  Reviewed normal renal function  EKG reviewed  She has no chest pain.   Increase losartan to 100mg             Current medicines (including changes today)  Current Outpatient Medications   Medication Sig Dispense Refill   • dicyclomine (BENTYL) 10 MG Cap TAKE 1 CAPSULE BY MOUTH 4 TIMES DAILY BEFORE MEAL(S) AND AT BEDTIME 120 Capsule 3   • estradiol (ESTRACE) 0.1 MG/GM vaginal cream INSERT 1 GRAM VAGINALLY ONCE DAILY 43 g 3   • losartan (COZAAR) 50 MG Tab Take 1 Tablet by mouth every day. 90 Tablet 3   • simvastatin (ZOCOR) 20 MG Tab TAKE 1 TABLET BY MOUTH ONCE DAILY IN THE EVENING 90 Tablet 1   • pantoprazole (PROTONIX) 40 MG Tablet Delayed Response Take 1 tablet by mouth once daily 90 Tablet 1   • famotidine (PEPCID) 40 MG Tab Take 1 Tablet by mouth 2 times a day. 180 Tablet 1   • solifenacin (VESICARE) 10 MG tablet Take 1 Tab by mouth every day. Brand name only--please dispense brand name as pt does not tolerate the generic version of medication, please dispense 90 Tab 3   • vitamin D (CHOLECALCIFEROL) 1000 UNIT Tab Take 1,000 Units by mouth every day.       No current facility-administered medications for this visit.     She  has a past medical history of GERD (gastroesophageal reflux disease), Hyperlipidemia, and Hypertension.    ROS  No chest pain, no shortness of breath, no abdominal pain       Objective:     /82   Pulse (!) 102   Temp 36.4 °C (97.6 °F)   Ht 1.549 m (5' 1\")   Wt 71.2 kg (157 lb)   SpO2 97%  Body mass index is 29.66 kg/m².   Physical Exam:  Constitutional: Alert, no distress.  Skin: Warm, dry, good turgor, no rashes in visible areas.  Eye: Equal, round and reactive, conjunctiva clear, lids normal.  ENMT: Lips without lesions, good dentition, oropharynx clear.  Neck: Trachea midline, no masses, no thyromegaly. No " cervical or supraclavicular lymphadenopathy  Respiratory: Unlabored respiratory effort, lungs clear to auscultation, no wheezes, no ronchi.  Cardiovascular: Normal S1, S2, no murmur, no edema.  Abdomen: Soft, non-tender, no masses, no hepatosplenomegaly.  Psych: Alert and oriented x3, normal affect and mood.        Assessment and Plan:   The following treatment plan was discussed    1. Menopause    - DS-BONE DENSITY STUDY (DEXA); Future    2. Primary hypertension      3. Elevated LFTs    - HEPATIC FUNCTION PANEL; Future    4. Need for vaccination    - Prevnar-13 Vaccine (PCV13)      Followup: Return for 2 weeks BP check, HTN.

## 2021-12-29 NOTE — ASSESSMENT & PLAN NOTE
On losartan 50 mg  BP still elevated 150/82 Hr 102  Reviewed normal renal function  EKG reviewed  She has no chest pain.   Increase losartan to 100mg

## 2022-01-12 ENCOUNTER — TELEPHONE (OUTPATIENT)
Dept: MEDICAL GROUP | Facility: PHYSICIAN GROUP | Age: 67
End: 2022-01-12

## 2022-01-12 ENCOUNTER — NON-PROVIDER VISIT (OUTPATIENT)
Dept: MEDICAL GROUP | Facility: PHYSICIAN GROUP | Age: 67
End: 2022-01-12
Payer: COMMERCIAL

## 2022-01-12 VITALS — SYSTOLIC BLOOD PRESSURE: 144 MMHG | DIASTOLIC BLOOD PRESSURE: 80 MMHG

## 2022-01-12 NOTE — NON-PROVIDER
Ruth Shoemaker is a 66 y.o. female here for a non-provider visit for Bp Check     Vitals:    01/12/22 1424   BP: 144/80   BP Location: Right arm   Patient Position: Sitting   BP Cuff Size: Adult     If abnormal, was the Registered Nurse (office provider if RN is unavailable) notified today? Not Indicated    Routed to PCP? Yes    Ruth came into the office today for a BP check. Ruth states that you wanted to increase her BP medication. Ruth states this was discussed on 12/29/2021 however no new prescription was sent over. Pt also requesting provider opinion on life line screening see documents scanned in media. Please advise?

## 2022-01-13 DIAGNOSIS — E78.2 MIXED HYPERLIPIDEMIA: ICD-10-CM

## 2022-01-13 DIAGNOSIS — I10 PRIMARY HYPERTENSION: ICD-10-CM

## 2022-01-13 RX ORDER — LOSARTAN POTASSIUM 50 MG/1
100 TABLET ORAL DAILY
Qty: 180 TABLET | Refills: 3 | Status: SHIPPED | OUTPATIENT
Start: 2022-01-13 | End: 2022-03-31

## 2022-01-13 NOTE — TELEPHONE ENCOUNTER
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 Ruth advised that you told her to increase her losartan to 100 mg, however you did not call the prescription in. Can you please send in the prescription for the losartan two times a day?

## 2022-01-14 RX ORDER — AMLODIPINE BESYLATE 5 MG/1
5 TABLET ORAL DAILY
Qty: 30 TABLET | Refills: 3 | Status: SHIPPED | OUTPATIENT
Start: 2022-01-14 | End: 2022-03-31 | Stop reason: SDUPTHER

## 2022-02-16 ENCOUNTER — APPOINTMENT (OUTPATIENT)
Dept: MEDICAL GROUP | Facility: PHYSICIAN GROUP | Age: 67
End: 2022-02-16
Payer: MEDICARE

## 2022-02-18 ENCOUNTER — NON-PROVIDER VISIT (OUTPATIENT)
Dept: MEDICAL GROUP | Facility: PHYSICIAN GROUP | Age: 67
End: 2022-02-18
Payer: MEDICARE

## 2022-02-18 VITALS — SYSTOLIC BLOOD PRESSURE: 122 MMHG | DIASTOLIC BLOOD PRESSURE: 80 MMHG

## 2022-02-18 NOTE — NON-PROVIDER
Ruth Shoemaker is a 66 y.o. female here for a non-provider visit for Blood pressure check     There were no vitals filed for this visit.  If abnormal, was the Registered Nurse (office provider if RN is unavailable) notified today? Not Indicated    Routed to PCP? No

## 2022-03-23 ENCOUNTER — HOSPITAL ENCOUNTER (OUTPATIENT)
Dept: LAB | Facility: MEDICAL CENTER | Age: 67
End: 2022-03-23
Attending: FAMILY MEDICINE
Payer: MEDICARE

## 2022-03-23 DIAGNOSIS — R79.89 ELEVATED LFTS: ICD-10-CM

## 2022-03-23 LAB
ALBUMIN SERPL BCP-MCNC: 4.9 G/DL (ref 3.2–4.9)
ALP SERPL-CCNC: 93 U/L (ref 30–99)
ALT SERPL-CCNC: 69 U/L (ref 2–50)
AST SERPL-CCNC: 33 U/L (ref 12–45)
BILIRUB CONJ SERPL-MCNC: <0.2 MG/DL (ref 0.1–0.5)
BILIRUB INDIRECT SERPL-MCNC: ABNORMAL MG/DL (ref 0–1)
BILIRUB SERPL-MCNC: 0.5 MG/DL (ref 0.1–1.5)
PROT SERPL-MCNC: 7.2 G/DL (ref 6–8.2)

## 2022-03-23 PROCEDURE — 36415 COLL VENOUS BLD VENIPUNCTURE: CPT

## 2022-03-23 PROCEDURE — 80076 HEPATIC FUNCTION PANEL: CPT

## 2022-03-25 NOTE — RESULT ENCOUNTER NOTE
Released to [facility],  Your labs show that one liver enzyme is still a bit high. I'll talk about next steps to evaluation at your visit with me 3/31/22 at 2.30 make sure to come 15-30 min early!  Iva Hopkins M.D.

## 2022-03-31 ENCOUNTER — OFFICE VISIT (OUTPATIENT)
Dept: MEDICAL GROUP | Facility: PHYSICIAN GROUP | Age: 67
End: 2022-03-31
Payer: MEDICARE

## 2022-03-31 VITALS
BODY MASS INDEX: 28.52 KG/M2 | OXYGEN SATURATION: 97 % | DIASTOLIC BLOOD PRESSURE: 80 MMHG | RESPIRATION RATE: 14 BRPM | TEMPERATURE: 97.6 F | HEART RATE: 95 BPM | HEIGHT: 62 IN | SYSTOLIC BLOOD PRESSURE: 130 MMHG | WEIGHT: 155 LBS

## 2022-03-31 DIAGNOSIS — E55.9 VITAMIN D DEFICIENCY: ICD-10-CM

## 2022-03-31 DIAGNOSIS — E78.2 MIXED HYPERLIPIDEMIA: ICD-10-CM

## 2022-03-31 DIAGNOSIS — R79.89 ABNORMAL TSH: ICD-10-CM

## 2022-03-31 DIAGNOSIS — I10 PRIMARY HYPERTENSION: ICD-10-CM

## 2022-03-31 PROCEDURE — 99214 OFFICE O/P EST MOD 30 MIN: CPT | Performed by: FAMILY MEDICINE

## 2022-03-31 RX ORDER — AMLODIPINE BESYLATE 5 MG/1
5 TABLET ORAL DAILY
Qty: 90 TABLET | Refills: 3 | Status: SHIPPED | OUTPATIENT
Start: 2022-03-31 | End: 2022-11-30

## 2022-03-31 RX ORDER — LOSARTAN POTASSIUM 100 MG/1
100 TABLET ORAL DAILY
Qty: 90 TABLET | Refills: 3 | Status: SHIPPED | OUTPATIENT
Start: 2022-03-31 | End: 2023-04-18 | Stop reason: SDUPTHER

## 2022-03-31 ASSESSMENT — PATIENT HEALTH QUESTIONNAIRE - PHQ9: CLINICAL INTERPRETATION OF PHQ2 SCORE: 0

## 2022-03-31 ASSESSMENT — FIBROSIS 4 INDEX: FIB4 SCORE: 1.36

## 2022-03-31 NOTE — PROGRESS NOTES
"Subjective:   Ruth Shoemaker is a 66 y.o. female here today for evaluation and management of:     Abnormal TSH  Repeat labs ordered. Normal tsh in 2018 not checked since then.     Mixed hyperlipidemia  Chronic condition, patient is on simvastatin 20 mg with normal lfts and no myalgia  Fasting labs ordered.       Primary hypertension  Chronic condition, well controlled on losartan 100mg, she has no chest pain with exertion, LE edema.   Repeat cmp ordered.          Current medicines (including changes today)  Current Outpatient Medications   Medication Sig Dispense Refill   • losartan (COZAAR) 100 MG Tab Take 1 Tablet by mouth every day. 90 Tablet 3   • amLODIPine (NORVASC) 5 MG Tab Take 1 Tablet by mouth every day. For high blood pressure 90 Tablet 3   • simvastatin (ZOCOR) 20 MG Tab Take 1 Tablet by mouth every evening. 90 Tablet 3   • pantoprazole (PROTONIX) 40 MG Tablet Delayed Response Take 1 Tablet by mouth every day. 90 Tablet 3   • dicyclomine (BENTYL) 10 MG Cap TAKE 1 CAPSULE BY MOUTH 4 TIMES DAILY BEFORE MEAL(S) AND AT BEDTIME 120 Capsule 3   • famotidine (PEPCID) 40 MG Tab Take 1 Tablet by mouth 2 times a day. 180 Tablet 3   • estradiol (ESTRACE) 0.1 MG/GM vaginal cream INSERT 1 GRAM VAGINALLY ONCE DAILY 43 g 3   • solifenacin (VESICARE) 10 MG tablet Take 1 Tab by mouth every day. Brand name only--please dispense brand name as pt does not tolerate the generic version of medication, please dispense 90 Tab 3   • vitamin D (CHOLECALCIFEROL) 1000 UNIT Tab Take 1,000 Units by mouth every day.       No current facility-administered medications for this visit.     She  has a past medical history of GERD (gastroesophageal reflux disease), Hyperlipidemia, and Hypertension.    ROS  No chest pain, no shortness of breath, no abdominal pain       Objective:     /80   Pulse 95   Temp 36.4 °C (97.6 °F) (Temporal)   Resp 14   Ht 1.575 m (5' 2\")   Wt 70.3 kg (155 lb)   SpO2 97%  Body mass index is 28.35 " kg/m².   Physical Exam:  Constitutional: Alert, no distress.  Skin: Warm, dry, good turgor, no rashes in visible areas.  Eye: Equal, round and reactive, conjunctiva clear, lids normal.  ENMT: Lips without lesions, good dentition, oropharynx clear.  Neck: Trachea midline, no masses, no thyromegaly. No cervical or supraclavicular lymphadenopathy  Respiratory: Unlabored respiratory effort, lungs clear to auscultation, no wheezes, no ronchi.  Cardiovascular: Normal S1, S2, no murmur, no edema.  Abdomen: Soft, non-tender, no masses, no hepatosplenomegaly.  Psych: Alert and oriented x3, normal affect and mood.        Assessment and Plan:   The following treatment plan was discussed    1. Mixed hyperlipidemia    - Comp Metabolic Panel; Future  - Lipid Profile; Future    2. Vitamin D deficiency    - VITAMIN D,25 HYDROXY; Future    3. Abnormal TSH    - TSH WITH REFLEX TO FT4; Future      Followup: Return in about 3 months (around 6/30/2022) for follow up .

## 2022-03-31 NOTE — LETTER
[facility]  Iva Hopkins M.D.  [address]  [place] [address]  Fax: [phone]   Authorization for Release/Disclosure of   Protected Health Information   Name: RUTH SHOEMAKER : 1955 SSN: [id number]   Address: [place] [address] Phone:    [phone] (home)    I authorize the entity listed below to release/disclose the PHI below to:   [facility]/Iva Hopkins M.D. and Iva Hopkins M.D.   Provider or Entity Name: [place]         Address   City, State, [facility]   Phone: [phone]      Fax:[phone]     Reason for request: continuity of care   Information to be released:    [  ] LAST COLONOSCOPY,  including any PATH REPORT and follow-up  [  ] LAST FIT/COLOGUARD RESULT [  ] LAST DEXA  [x] LAST MAMMOGRAM  [  ] LAST PAP  [  ] LAST LABS [  ] RETINA EXAM REPORT  [  ] IMMUNIZATION RECORDS  [  ] Release all info      [  ] Check here and initial the line next to each item to release ALL health information INCLUDING  _____ Care and treatment for drug and / or alcohol abuse  _____ HIV testing, infection status, or AIDS  _____ Genetic Testing    DATES OF SERVICE OR TIME PERIOD TO BE DISCLOSED: _____________  I understand and acknowledge that:  * This Authorization may be revoked at any time by you in writing, except if your health information has already been used or disclosed.  * Your health information that will be used or disclosed as a result of you signing this authorization could be re-disclosed by the recipient. If this occurs, your re-disclosed health information may no longer be protected by State or Federal laws.  * You may refuse to sign this Authorization. Your refusal will not affect your ability to obtain treatment.  * This Authorization becomes effective upon signing and will  on (date) __________.      If no date is indicated, this Authorization will  one (1) year from the signature date.    Name: Ruth Shoemaker    Signature:   Date:      3/31/2022       PLEASE FAX REQUESTED RECORDS BACK TO: [phone]

## 2022-04-01 NOTE — RESULT ENCOUNTER NOTE
Released to [facility]  Your mammogram was normal! Next is due in one year.   Please let me know immediately if you notice any new lumps/rashes/pain/nipple discharge.  Iva Hopkins M.D.

## 2022-04-12 NOTE — ASSESSMENT & PLAN NOTE
Chronic condition, patient is on simvastatin 20 mg with normal lfts and no myalgia  Fasting labs ordered.

## 2022-04-12 NOTE — ASSESSMENT & PLAN NOTE
Chronic condition, well controlled on losartan 100mg, she has no chest pain with exertion, LE edema.   Repeat cmp ordered.

## 2022-06-09 ENCOUNTER — APPOINTMENT (OUTPATIENT)
Dept: DERMATOLOGY | Facility: IMAGING CENTER | Age: 67
End: 2022-06-09
Payer: MEDICARE

## 2022-06-16 ENCOUNTER — OFFICE VISIT (OUTPATIENT)
Dept: DERMATOLOGY | Facility: IMAGING CENTER | Age: 67
End: 2022-06-16
Payer: MEDICARE

## 2022-06-16 DIAGNOSIS — L91.8 ACROCHORDON: ICD-10-CM

## 2022-06-16 DIAGNOSIS — D18.01 CHERRY ANGIOMA: ICD-10-CM

## 2022-06-16 DIAGNOSIS — Z12.83 SKIN CANCER SCREENING: ICD-10-CM

## 2022-06-16 DIAGNOSIS — L81.4 LENTIGINES: ICD-10-CM

## 2022-06-16 DIAGNOSIS — D22.9 MULTIPLE NEVI: ICD-10-CM

## 2022-06-16 DIAGNOSIS — L82.1 SK (SEBORRHEIC KERATOSIS): ICD-10-CM

## 2022-06-16 PROCEDURE — 99213 OFFICE O/P EST LOW 20 MIN: CPT | Mod: 25 | Performed by: NURSE PRACTITIONER

## 2022-06-16 PROCEDURE — 17110 DESTRUCTION B9 LES UP TO 14: CPT | Performed by: NURSE PRACTITIONER

## 2022-06-16 NOTE — PROGRESS NOTES
DERMATOLOGY NOTE  NEW VISIT       Chief complaint: Establish Care       MYNOR       History of skin cancer: No  History of precancers/actinic keratoses: Yes, Details: face   History of biopsies:Yes, Details: yes   History of blistering/severe sunburns:Yes, Details: teenager   Family history of skin cancer:Yes, Details: mother , father type unknown  , daughter   Family history of atypical moles:Yes, Details: daughter       Allergies   Allergen Reactions   • Codeine Nausea        MEDICATIONS:  Medications relevant to specialty reviewed.     REVIEW OF SYSTEMS:   Positive for skin (see HPI)  Negative for fevers and chills       EXAM:  There were no vitals taken for this visit.  Constitutional: Well-developed, well-nourished, and in no distress.     A total body skin exam was performed excluding the genitals per patient preference and including the following areas: head (including face), neck, chest, abdomen, groin/buttocks, back, bilateral upper extremities, and bilateral lower extremities with the following pertinent findings listed below and/or in assessment/plan.       -sun exposed skin of trunk and b/l upper, lower extremities and face with scattered clinically benign light brown reticulated macules all of which were morphologically similar and none of which were suspicious for skin cancer today on exam  Several scattered 1-3mm bright red macules and thin papules on the trunk  Multiple tan medium brown skin-colored macules papules scattered over the trunk >> extremities, All with benign-appearing pigment network patterns on dermoscopy  Several tan skin-colored stuck-on waxy papules scattered on the trunk, extremities and face  multiple skin tags neck line that are irritated           IMPRESSION / PLAN:    1. Lentigines  - Benign-appearing nature of lesions discussed during exam.   - Advised to continue to monitor for any return to clinic for new or concerning changes.      2. Cherry angioma  - Benign-appearing nature  of lesions discussed during exam.   - Advised to continue to monitor for any return to clinic for new or concerning changes.      3. Multiple nevi  - Benign-appearing nature of lesions discussed during exam.   - Advised to continue to monitor for any return to clinic for new or concerning changes.  - ABCDE's of melanoma discussed      4. SK (seborrheic keratosis)  - Benign-appearing nature of lesions discussed during exam.  - several SKs on face and neck line, pruritic, irritating and easily traumatized, thus LN2 applied:  CRYOTHERAPY:  Risks (including, but not limited to: skin discoloration, redness, blister, blood blister, recurrence, need for further treatment, infection, scar) and benefits of cryotherapy discussed. Patient verbally agreed to proceed with treatment. 1 cryotherapy freeze thaw cycles of 10 seconds were applied to 6 lesions on face and neck with cryac. Patient tolerated procedure well. Aftercare instructions given--no specific care needed unless irritated during healing process, can apply Vaseline with small band-aid if needed.     - Advised to continue to monitor for any return to clinic for new or concerning changes.      5. Acrochordon  - Benign-appearing nature of lesions discussed during exam.   - Advised pt of cosmetic removal with associated fees  - Advised to continue to monitor for any return to clinic for new or concerning changes.      6. Skin cancer screening  Skin cancer education  - discussed importance of sun protective clothing, eyewear in addition to the use of broad spectrum sunscreen with SPF 30 or greater, as well as need for reapplication ~every 2 hours when exposed to UVR  - discussed importance following up for any new or changing lesions as noted in handout given, but every 12 months exams in clinic in the setting of dermatologic history  - ABCDE's of melanoma discussed/handout given          Discussed risks, benefits, alternative treatments as well as common side effects  associated with prescribed treatment, Patient verbalized understanding and agrees with plan regarding the above            Please note that this dictation was created using voice recognition software. I have made every reasonable attempt to correct obvious errors, but I expect that there are errors of grammar and possibly content that I did not discover before finalizing the note.      Return to clinic in: Return in about 1 year (around 6/16/2023) for MYNOR, cosmetic removal of acrochordon at pt's convenience. and as needed for any new or changing skin lesions.

## 2022-06-23 ENCOUNTER — OFFICE VISIT (OUTPATIENT)
Dept: DERMATOLOGY | Facility: IMAGING CENTER | Age: 67
End: 2022-06-23

## 2022-06-23 DIAGNOSIS — L82.1 SK (SEBORRHEIC KERATOSIS): ICD-10-CM

## 2022-06-23 DIAGNOSIS — L91.8 ACROCHORDON: ICD-10-CM

## 2022-06-23 PROCEDURE — 99999 PR NO CHARGE: CPT | Performed by: NURSE PRACTITIONER

## 2022-07-12 ENCOUNTER — OFFICE VISIT (OUTPATIENT)
Dept: MEDICAL GROUP | Facility: PHYSICIAN GROUP | Age: 67
End: 2022-07-12
Payer: MEDICARE

## 2022-07-12 ENCOUNTER — APPOINTMENT (OUTPATIENT)
Dept: RADIOLOGY | Facility: IMAGING CENTER | Age: 67
End: 2022-07-12
Attending: NURSE PRACTITIONER
Payer: MEDICARE

## 2022-07-12 VITALS
OXYGEN SATURATION: 98 % | SYSTOLIC BLOOD PRESSURE: 134 MMHG | HEART RATE: 111 BPM | HEIGHT: 62 IN | TEMPERATURE: 98.6 F | WEIGHT: 156.4 LBS | BODY MASS INDEX: 28.78 KG/M2 | RESPIRATION RATE: 18 BRPM | DIASTOLIC BLOOD PRESSURE: 84 MMHG

## 2022-07-12 DIAGNOSIS — M79.604 PAIN OF RIGHT LOWER EXTREMITY: ICD-10-CM

## 2022-07-12 DIAGNOSIS — S89.201A: ICD-10-CM

## 2022-07-12 PROBLEM — M79.606 LEG PAIN: Status: ACTIVE | Noted: 2022-07-12

## 2022-07-12 PROCEDURE — 99214 OFFICE O/P EST MOD 30 MIN: CPT | Performed by: NURSE PRACTITIONER

## 2022-07-12 PROCEDURE — 73590 X-RAY EXAM OF LOWER LEG: CPT | Mod: TC,FY,RT | Performed by: NURSE PRACTITIONER

## 2022-07-12 ASSESSMENT — PAIN SCALES - GENERAL: PAINLEVEL: 5=MODERATE PAIN

## 2022-07-12 ASSESSMENT — FIBROSIS 4 INDEX: FIB4 SCORE: 1.36

## 2022-07-12 NOTE — ASSESSMENT & PLAN NOTE
"This is a new condition.  Patient reports that she tripped over her 's boot on Sunday and now has left lower leg pain, just below the knee and on the lateral side.  She is positive for swelling.  Denies any loss of sensation, numbness, tingling, or redness.  She is able to bear weight \"hobbling\" but does have severe pain  5-9/10 pain level, sharp pain, worse with ambulation.   Has been taking over-the-counter Tylenol ibuprofen and using cryotherapy which does help with her pain  "

## 2022-07-12 NOTE — PROGRESS NOTES
"Chief Complaint   Patient presents with   • Leg Injury     Tripped over boots Sunday, hurt right outer calf       Subjective:     HPI:   Ruth Shoemaker is a 66 y.o. female here to discuss the evaluation and management of:      Leg pain  This is a new condition.  Patient reports that she tripped over her 's boot on Sunday and now has left lower leg pain, just below the knee and on the lateral side.  She is positive for swelling.  Denies any loss of sensation, numbness, tingling, or redness.  She is able to bear weight \"hobbling\" but does have severe pain  5-9/10 pain level, sharp pain, worse with ambulation.   Has been taking over-the-counter Tylenol ibuprofen and using cryotherapy which does help with her pain        ROS:  Gen: no fevers/chills, no changes in weight  Pulm: no sob, no cough  CV: no chest pain, no palpitations  MSk: no myalgias  Neuro: no headaches, no numbness/tingling      Allergies   Allergen Reactions   • Codeine Nausea       Current medicines (including changes today)  Current Outpatient Medications   Medication Sig Dispense Refill   • losartan (COZAAR) 100 MG Tab Take 1 Tablet by mouth every day. 90 Tablet 3   • amLODIPine (NORVASC) 5 MG Tab Take 1 Tablet by mouth every day. For high blood pressure 90 Tablet 3   • simvastatin (ZOCOR) 20 MG Tab Take 1 Tablet by mouth every evening. 90 Tablet 3   • pantoprazole (PROTONIX) 40 MG Tablet Delayed Response Take 1 Tablet by mouth every day. 90 Tablet 3   • dicyclomine (BENTYL) 10 MG Cap TAKE 1 CAPSULE BY MOUTH 4 TIMES DAILY BEFORE MEAL(S) AND AT BEDTIME 120 Capsule 3   • famotidine (PEPCID) 40 MG Tab Take 1 Tablet by mouth 2 times a day. 180 Tablet 3   • estradiol (ESTRACE) 0.1 MG/GM vaginal cream INSERT 1 GRAM VAGINALLY ONCE DAILY 43 g 3   • solifenacin (VESICARE) 10 MG tablet Take 1 Tab by mouth every day. Brand name only--please dispense brand name as pt does not tolerate the generic version of medication, please dispense 90 Tab 3   • " "vitamin D (CHOLECALCIFEROL) 1000 UNIT Tab Take 1,000 Units by mouth every day.       No current facility-administered medications for this visit.          Objective:       /84 (BP Location: Right arm)   Pulse (!) 111   Temp 37 °C (98.6 °F) (Temporal)   Resp 18   Ht 1.575 m (5' 2\")   Wt 70.9 kg (156 lb 6.4 oz)   SpO2 98%  Body mass index is 28.61 kg/m².    Physical Exam:  Constitutional: Well-developed and well-nourished female in NAD. Not diaphoretic. No distress.   Skin: warm, dry, intact  Cardiovascular: Regular rate and rhythm without murmur. Radial pulses are intact and equal bilaterally.  Pulmonary: Clear to ausculation. Normal effort. No rales, ronchi, or wheezing.  Abdomen: Soft, non tender, and without distention. Active bowel sounds in all four quadrants.   Extremities: Left lower leg: No noticeable deformity or bruising. Tenderness to palpation just below knee on the lateral aspect, no tenderness with palpation over tibial crest. Full ROM, 4/5 strength, anterior/posterior drawer negative. Patient is able to bear weight on left foot, with antalgic gait.   Neurological: Alert and oriented x 3.   Psychiatric:  Behavior, mood, and affect are appropriate.      DX-TIBIA AND FIBULA RIGHT  Order: [id number]   Status: Final result     Visible to patient: No (scheduled for 7/13/2022  2:59 PM)     Next appt: None     Dx: Pain of right lower extremity     0 Result Notes    Details    Reading Physician Reading Date Result Priority   Radhames Young M.D.  [phone] 7/12/2022 Routine     Narrative & Impression        HISTORY/REASON FOR EXAM:  Pain/Deformity Following Trauma        TECHNIQUE/EXAM DESCRIPTION AND NUMBER OF VIEWS:  2 views of the right tibia and fibula, 7/12/2022 4:43 PM.     COMPARISON: None     FINDINGS:  There appears be some cortical irregularity along the neck of the fibula suggesting fibular neck fracture. The tibia appears intact.     IMPRESSION:     Apparent cortical irregularity " about the fibular neck suggesting fibular neck fracture. Consider further evaluation with dedicated knee series.     DX-KNEE COMPLETE 4+ RIGHT  Order: [id number]   Status: Final result     Visible to patient: No (scheduled for 7/14/2022  3:37 PM)     Dx: Pain of right lower extremity     0 Result Notes    Details    Reading Physician Reading Date Result Priority   Azam De La Rosa M.D.  [phone] 7/13/2022 Routine     Narrative & Impression     7/13/2022 4:48 PM     HISTORY/REASON FOR EXAM:  Likely proximal fibular fx on previous imaging.     TECHNIQUE/EXAM DESCRIPTION AND NUMBER OF VIEWS:  4 views of the RIGHT knee.     COMPARISON: Right leg x-ray 7/13/2022     FINDINGS:  There is a right proximal fibular fracture. It is nondisplaced.     The proximal tibia and distal femur are intact. No effusion is identified.                 IMPRESSION:     Right proximal fibular fracture         Assessment and Plan:     The following treatment plan was discussed:      Patient complains of right lateral leg pain just distal to her knee since Sunday after tripping over her  boot.  She is able to ambulate however it causes pain.  There is swelling on the lateral aspect.  Point tenderness over proximal end of fibula.  Most likely musculoskeletal injury.,  However cannot rule out fracture.  X-ray order has been placed as indicated below.  At length discussion with patient regards to at home treatment including Tylenol, ibuprofen, cryotherapy, and elevation.  Ace bandage was applied today in clinic.  We will discuss his chronic results via MyChart or phone call.      1740  X-ray results indicate irregularity along the neck of fibula suggestion of fibular neck fracture.  Recommended knee series.  Order has been placed.  Attempted to call patient to discuss x-ray results.  Voice message was left.  Stat referral was placed to orthopedics.    1835: Attempted to call patient in regards to x-ray results.  No answer.    7/13/2022  1730  Patient return to clinic for follow-up x-ray on right knee due to fibular neck fracture.  Patient was fitted with long knee immobilizer and crutches were given.  Education was provided.  Discussed previous x-rays indicating fibular neck fracture.  Referral information to the [facility] was given in discharge and patient was encouraged to call and schedule appointment tomorrow.  Stressed the importance of ongoing cryotherapy and use of Tylenol and ibuprofen for pain.  Patient declined wanting anything stronger to help with pain.      1. Pain of right lower extremity  - DX-TIBIA AND FIBULA RIGHT; Future  - DX-KNEE COMPLETE 4+ RIGHT; Future  - Knee Immobilizer    2. Nondisplaced physeal fracture of proximal end of right fibula, initial encounter  - Knee Immobilizer  - Referral to Orthopedics      Any change or worsening of signs or symptoms, patient encouraged to follow-up or report to urgent care or emergency room for further evaluation. Patient verbalizes understanding and agrees.    Follow-Up: No follow-ups on file.      PLEASE NOTE: This dictation was created using voice recognition software. I have made every reasonable attempt to correct obvious errors, but I expect that there are errors of grammar and possibly content that I did not discover before finalizing the note.

## 2022-07-12 NOTE — Clinical Note
Can you please assist me with billing information for this patient.  I saw her 2 days in a row due to her needing to come back in for repeat x-rays as well as needing application of knee brace and crutches.  Thank you so much for your help, CORNELIO Treviño.

## 2022-07-13 ENCOUNTER — NON-PROVIDER VISIT (OUTPATIENT)
Dept: URGENT CARE | Facility: PHYSICIAN GROUP | Age: 67
End: 2022-07-13
Payer: MEDICARE

## 2022-07-13 ENCOUNTER — TELEPHONE (OUTPATIENT)
Dept: MEDICAL GROUP | Facility: PHYSICIAN GROUP | Age: 67
End: 2022-07-13
Payer: MEDICARE

## 2022-07-13 ENCOUNTER — APPOINTMENT (OUTPATIENT)
Dept: RADIOLOGY | Facility: IMAGING CENTER | Age: 67
End: 2022-07-13
Attending: NURSE PRACTITIONER
Payer: MEDICARE

## 2022-07-13 DIAGNOSIS — M79.604 PAIN OF RIGHT LOWER EXTREMITY: ICD-10-CM

## 2022-07-13 PROCEDURE — 73564 X-RAY EXAM KNEE 4 OR MORE: CPT | Mod: TC,FY,RT | Performed by: NURSE PRACTITIONER

## 2022-07-14 NOTE — PATIENT INSTRUCTIONS
Orthopedic Surgery     [facility]  [address]  [facility] [address]  Phone: [phone]

## 2022-07-19 PROBLEM — S82.831A: Status: ACTIVE | Noted: 2022-07-19

## 2022-09-05 DIAGNOSIS — K21.9 GASTROESOPHAGEAL REFLUX DISEASE WITHOUT ESOPHAGITIS: ICD-10-CM

## 2022-09-05 DIAGNOSIS — E78.2 MIXED HYPERLIPIDEMIA: ICD-10-CM

## 2022-09-06 RX ORDER — FAMOTIDINE 40 MG/1
TABLET, FILM COATED ORAL
Qty: 180 TABLET | Refills: 3 | Status: SHIPPED | OUTPATIENT
Start: 2022-09-06 | End: 2023-04-18 | Stop reason: SDUPTHER

## 2022-09-06 RX ORDER — PANTOPRAZOLE SODIUM 40 MG/1
TABLET, DELAYED RELEASE ORAL
Qty: 90 TABLET | Refills: 3 | Status: SHIPPED | OUTPATIENT
Start: 2022-09-06 | End: 2023-04-18 | Stop reason: SDUPTHER

## 2022-09-06 RX ORDER — SIMVASTATIN 20 MG
TABLET ORAL
Qty: 90 TABLET | Refills: 3 | Status: SHIPPED | OUTPATIENT
Start: 2022-09-06 | End: 2023-04-18 | Stop reason: SDUPTHER

## 2022-09-06 NOTE — TELEPHONE ENCOUNTER
Received request via: Pharmacy    Was the patient seen in the last year in this department? Yes    Does the patient have an active prescription (recently filled or refills available) for medication(s) requested? No    Last office Visit:07/12/2022  Last Labs:03/23/2022     General

## 2022-11-30 RX ORDER — AMLODIPINE BESYLATE 5 MG/1
TABLET ORAL
Qty: 90 TABLET | Refills: 0 | Status: SHIPPED | OUTPATIENT
Start: 2022-11-30 | End: 2023-04-18 | Stop reason: SDUPTHER

## 2022-12-01 NOTE — TELEPHONE ENCOUNTER
Received request via: Pharmacy    Was the patient seen in the last year in this department? Yes    Does the patient have an active prescription (recently filled or refills available) for medication(s) requested? No    Does the patient have senior living Plus and need 100 day supply (blood pressure, diabetes and cholesterol meds only)? Patient does not have SCP    Last Office Visit:07/12/2022  Last Labs:11/09/2021

## 2023-01-12 ENCOUNTER — HOSPITAL ENCOUNTER (OUTPATIENT)
Dept: LAB | Facility: MEDICAL CENTER | Age: 68
End: 2023-01-12
Attending: FAMILY MEDICINE
Payer: MEDICARE

## 2023-01-12 DIAGNOSIS — E55.9 VITAMIN D DEFICIENCY: ICD-10-CM

## 2023-01-12 DIAGNOSIS — R79.89 ABNORMAL TSH: ICD-10-CM

## 2023-01-12 DIAGNOSIS — E78.2 MIXED HYPERLIPIDEMIA: ICD-10-CM

## 2023-01-12 LAB
25(OH)D3 SERPL-MCNC: 56 NG/ML (ref 30–100)
ALBUMIN SERPL BCP-MCNC: 5 G/DL (ref 3.2–4.9)
ALBUMIN/GLOB SERPL: 2 G/DL
ALP SERPL-CCNC: 98 U/L (ref 30–99)
ALT SERPL-CCNC: 50 U/L (ref 2–50)
ANION GAP SERPL CALC-SCNC: 11 MMOL/L (ref 7–16)
AST SERPL-CCNC: 28 U/L (ref 12–45)
BILIRUB SERPL-MCNC: 0.7 MG/DL (ref 0.1–1.5)
BUN SERPL-MCNC: 16 MG/DL (ref 8–22)
CALCIUM ALBUM COR SERPL-MCNC: 8.8 MG/DL (ref 8.5–10.5)
CALCIUM SERPL-MCNC: 9.6 MG/DL (ref 8.5–10.5)
CHLORIDE SERPL-SCNC: 105 MMOL/L (ref 96–112)
CHOLEST SERPL-MCNC: 156 MG/DL (ref 100–199)
CO2 SERPL-SCNC: 26 MMOL/L (ref 20–33)
CREAT SERPL-MCNC: 0.81 MG/DL (ref 0.5–1.4)
FASTING STATUS PATIENT QL REPORTED: NORMAL
GFR SERPLBLD CREATININE-BSD FMLA CKD-EPI: 80 ML/MIN/1.73 M 2
GLOBULIN SER CALC-MCNC: 2.5 G/DL (ref 1.9–3.5)
GLUCOSE SERPL-MCNC: 99 MG/DL (ref 65–99)
HDLC SERPL-MCNC: 44 MG/DL
LDLC SERPL CALC-MCNC: 71 MG/DL
POTASSIUM SERPL-SCNC: 4.1 MMOL/L (ref 3.6–5.5)
PROT SERPL-MCNC: 7.5 G/DL (ref 6–8.2)
SODIUM SERPL-SCNC: 142 MMOL/L (ref 135–145)
TRIGL SERPL-MCNC: 205 MG/DL (ref 0–149)
TSH SERPL DL<=0.005 MIU/L-ACNC: 4.07 UIU/ML (ref 0.38–5.33)

## 2023-01-12 PROCEDURE — 84443 ASSAY THYROID STIM HORMONE: CPT

## 2023-01-12 PROCEDURE — 36415 COLL VENOUS BLD VENIPUNCTURE: CPT

## 2023-01-12 PROCEDURE — 80053 COMPREHEN METABOLIC PANEL: CPT

## 2023-01-12 PROCEDURE — 80061 LIPID PANEL: CPT

## 2023-01-12 PROCEDURE — 82306 VITAMIN D 25 HYDROXY: CPT

## 2023-01-18 ENCOUNTER — OFFICE VISIT (OUTPATIENT)
Dept: MEDICAL GROUP | Facility: PHYSICIAN GROUP | Age: 68
End: 2023-01-18
Payer: MEDICARE

## 2023-01-18 VITALS
OXYGEN SATURATION: 97 % | TEMPERATURE: 97.5 F | BODY MASS INDEX: 29.27 KG/M2 | RESPIRATION RATE: 14 BRPM | HEART RATE: 99 BPM | HEIGHT: 61 IN | WEIGHT: 155 LBS | DIASTOLIC BLOOD PRESSURE: 82 MMHG | SYSTOLIC BLOOD PRESSURE: 130 MMHG

## 2023-01-18 DIAGNOSIS — N39.3 STRESS INCONTINENCE OF URINE: ICD-10-CM

## 2023-01-18 DIAGNOSIS — Z78.0 POSTMENOPAUSAL STATUS (AGE-RELATED) (NATURAL): ICD-10-CM

## 2023-01-18 DIAGNOSIS — Z23 NEED FOR VACCINATION: ICD-10-CM

## 2023-01-18 DIAGNOSIS — N95.1 MENOPAUSAL STATE: ICD-10-CM

## 2023-01-18 PROCEDURE — 99214 OFFICE O/P EST MOD 30 MIN: CPT | Mod: 25 | Performed by: FAMILY MEDICINE

## 2023-01-18 PROCEDURE — G0009 ADMIN PNEUMOCOCCAL VACCINE: HCPCS | Performed by: FAMILY MEDICINE

## 2023-01-18 PROCEDURE — 90732 PPSV23 VACC 2 YRS+ SUBQ/IM: CPT | Performed by: FAMILY MEDICINE

## 2023-01-18 RX ORDER — LOSARTAN POTASSIUM 50 MG/1
50 TABLET ORAL DAILY
COMMUNITY
Start: 2022-11-30 | End: 2023-01-18

## 2023-01-18 ASSESSMENT — FIBROSIS 4 INDEX: FIB4 SCORE: 1.38

## 2023-01-18 ASSESSMENT — PATIENT HEALTH QUESTIONNAIRE - PHQ9: CLINICAL INTERPRETATION OF PHQ2 SCORE: 0

## 2023-01-18 NOTE — PROGRESS NOTES
Subjective:   Ruth Shoemaker is a 67 y.o. female here today for evaluation and management of:     Stress incontinence of urine  Patient has stress incontinence of urine with sneezing, coughing or laughing, she now does not even feel leaks  She is interested in bladder nerve stimulation treatment.   She is on vesicare which helps with urinary frequency.   She used to have frequent bladder infections but as soon as she was given vaginal estradiol she has no UTIs  She has been trying to drink detox tea with lemon and blue agave.   She stays up later till 12 so she does not have to wake up to go to the bathroom to urinate.   Ref to urology provided. Patient wants a female doctor.              Current medicines (including changes today)  Current Outpatient Medications   Medication Sig Dispense Refill    amLODIPine (NORVASC) 5 MG Tab TAKE 1 TABLET BY MOUTH ONCE DAILY FOR HIGH BLOOD PRESSURE 90 Tablet 0    famotidine (PEPCID) 40 MG Tab Take 1 tablet by mouth twice daily 180 Tablet 3    pantoprazole (PROTONIX) 40 MG Tablet Delayed Response Take 1 tablet by mouth once daily 90 Tablet 3    simvastatin (ZOCOR) 20 MG Tab TAKE 1 TABLET BY MOUTH ONCE DAILY IN THE EVENING 90 Tablet 3    losartan (COZAAR) 100 MG Tab Take 1 Tablet by mouth every day. 90 Tablet 3    dicyclomine (BENTYL) 10 MG Cap TAKE 1 CAPSULE BY MOUTH 4 TIMES DAILY BEFORE MEAL(S) AND AT BEDTIME 120 Capsule 3    estradiol (ESTRACE) 0.1 MG/GM vaginal cream INSERT 1 GRAM VAGINALLY ONCE DAILY 43 g 3    solifenacin (VESICARE) 10 MG tablet Take 1 Tab by mouth every day. Brand name only--please dispense brand name as pt does not tolerate the generic version of medication, please dispense 90 Tab 3    vitamin D (CHOLECALCIFEROL) 1000 UNIT Tab Take 1,000 Units by mouth every day.       No current facility-administered medications for this visit.     She  has a past medical history of GERD (gastroesophageal reflux disease), Hyperlipidemia, and Hypertension.    ROS  No  "chest pain, no shortness of breath, no abdominal pain       Objective:     /82 (BP Location: Left arm, Patient Position: Sitting, BP Cuff Size: Adult)   Pulse 99   Temp 36.4 °C (97.5 °F) (Temporal)   Resp 14   Ht 1.549 m (5' 1\")   Wt 70.3 kg (155 lb)   SpO2 97%  Body mass index is 29.29 kg/m².   Physical Exam:  Constitutional: Alert, no distress.  Skin: Warm, dry, good turgor, no rashes in visible areas.  Eye: Equal, round and reactive, conjunctiva clear, lids normal.  ENMT: Lips without lesions, good dentition, oropharynx clear.  Neck: Trachea midline, no masses, no thyromegaly. No cervical or supraclavicular lymphadenopathy  Respiratory: Unlabored respiratory effort, lungs clear to auscultation, no wheezes, no ronchi.  Cardiovascular: Normal S1, S2, no murmur, no edema.  Abdomen: Soft, non-tender, no masses, no hepatosplenomegaly.  Psych: Alert and oriented x3, normal affect and mood.        Assessment and Plan:   The following treatment plan was discussed    1. Postmenopausal status (age-related) (natural)  - DS-BONE DENSITY STUDY (DEXA); Future    2. Menopausal state  - DS-BONE DENSITY STUDY (DEXA); Future    3. Need for vaccination  - PneumoVax PPV23 =>1yo    4. Stress incontinence of urine  - Referral to Urology      Followup: No follow-ups on file.           "

## 2023-01-18 NOTE — ASSESSMENT & PLAN NOTE
Patient has stress incontinence of urine with sneezing, coughing or laughing, she now does not even feel leaks  She is interested in bladder nerve stimulation treatment.   She is on vesicare which helps with urinary frequency.   She used to have frequent bladder infections but as soon as she was given vaginal estradiol she has no UTIs  She has been trying to drink detox tea with lemon and blue agave.   She stays up later till 12 so she does not have to wake up to go to the bathroom to urinate.   Ref to urology provided. Patient wants a female doctor.

## 2023-04-18 ENCOUNTER — OFFICE VISIT (OUTPATIENT)
Dept: MEDICAL GROUP | Facility: PHYSICIAN GROUP | Age: 68
End: 2023-04-18
Payer: MEDICARE

## 2023-04-18 VITALS
DIASTOLIC BLOOD PRESSURE: 70 MMHG | RESPIRATION RATE: 14 BRPM | WEIGHT: 155.6 LBS | OXYGEN SATURATION: 97 % | BODY MASS INDEX: 27.57 KG/M2 | SYSTOLIC BLOOD PRESSURE: 134 MMHG | HEART RATE: 95 BPM | TEMPERATURE: 97 F | HEIGHT: 63 IN

## 2023-04-18 DIAGNOSIS — N39.3 STRESS INCONTINENCE OF URINE: ICD-10-CM

## 2023-04-18 DIAGNOSIS — R35.0 URINARY FREQUENCY: ICD-10-CM

## 2023-04-18 DIAGNOSIS — N95.2 VAGINAL ATROPHY: ICD-10-CM

## 2023-04-18 DIAGNOSIS — R10.2 PELVIC PAIN: ICD-10-CM

## 2023-04-18 DIAGNOSIS — R10.13 EPIGASTRIC PAIN: ICD-10-CM

## 2023-04-18 DIAGNOSIS — E78.2 MIXED HYPERLIPIDEMIA: ICD-10-CM

## 2023-04-18 DIAGNOSIS — K21.9 GASTROESOPHAGEAL REFLUX DISEASE WITHOUT ESOPHAGITIS: ICD-10-CM

## 2023-04-18 PROCEDURE — 99214 OFFICE O/P EST MOD 30 MIN: CPT | Performed by: FAMILY MEDICINE

## 2023-04-18 RX ORDER — SOLIFENACIN SUCCINATE 10 MG/1
10 TABLET, FILM COATED ORAL DAILY
Qty: 90 TABLET | Refills: 3 | Status: SHIPPED | OUTPATIENT
Start: 2023-04-18

## 2023-04-18 RX ORDER — AMLODIPINE BESYLATE 5 MG/1
TABLET ORAL
Qty: 90 TABLET | Refills: 3 | Status: SHIPPED | OUTPATIENT
Start: 2023-04-18 | End: 2023-10-17 | Stop reason: SDUPTHER

## 2023-04-18 RX ORDER — FAMOTIDINE 40 MG/1
40 TABLET, FILM COATED ORAL 2 TIMES DAILY
Qty: 180 TABLET | Refills: 3 | Status: SHIPPED | OUTPATIENT
Start: 2023-04-18

## 2023-04-18 RX ORDER — PANTOPRAZOLE SODIUM 40 MG/1
40 TABLET, DELAYED RELEASE ORAL DAILY
Qty: 90 TABLET | Refills: 3 | Status: SHIPPED | OUTPATIENT
Start: 2023-04-18

## 2023-04-18 RX ORDER — LOSARTAN POTASSIUM 100 MG/1
100 TABLET ORAL DAILY
Qty: 90 TABLET | Refills: 3 | Status: SHIPPED | OUTPATIENT
Start: 2023-04-18

## 2023-04-18 RX ORDER — DICYCLOMINE HYDROCHLORIDE 10 MG/1
CAPSULE ORAL
Qty: 120 CAPSULE | Refills: 3 | Status: SHIPPED | OUTPATIENT
Start: 2023-04-18 | End: 2023-04-27 | Stop reason: SDUPTHER

## 2023-04-18 RX ORDER — ESTRADIOL 0.1 MG/G
CREAM VAGINAL
Qty: 43 G | Refills: 3 | Status: SHIPPED | OUTPATIENT
Start: 2023-04-18

## 2023-04-18 RX ORDER — SIMVASTATIN 20 MG
20 TABLET ORAL EVERY EVENING
Qty: 90 TABLET | Refills: 3 | Status: SHIPPED | OUTPATIENT
Start: 2023-04-18

## 2023-04-18 ASSESSMENT — FIBROSIS 4 INDEX: FIB4 SCORE: 1.38

## 2023-04-18 NOTE — ASSESSMENT & PLAN NOTE
Patient is on estrace vaginal cream which prevents the frequent UTIs she used to have, vesicare, she uses a pad for urinary stress incontinence.   She is going to have urology treatment, evaluation.

## 2023-04-18 NOTE — PROGRESS NOTES
Subjective:   Ruth Shoemaker is a 67 y.o. female here today for evaluation and management of:     Pelvic pain  For about 2 months she has pelvic discomfort. No vaginal discharge. When standing and inserting vaginal estrogen applicator she felt it obstructed by something on vaginal wall.   She uses vaginal estrogen for post menopausal vaginal dryness and atrophy.     Pelvic exam: external no lesions, mild atrophy  Vaginal no lesions, no cervical lesions, no adnexal masses, uterus normal size.   Normal pelvic exam with speculum and bimanual examination.     Reassurance provided. Advised to monitor closely, if worsening symptoms will refer to gyn, check pelvic US      Stress incontinence of urine  Patient is on estrace vaginal cream which prevents the frequent UTIs she used to have, vesicare, she uses a pad for urinary stress incontinence.   She is going to have urology treatment, evaluation.        refill of medications provided.   Labs reviewed.         Current medicines (including changes today)  Current Outpatient Medications   Medication Sig Dispense Refill    amLODIPine (NORVASC) 5 MG Tab TAKE 1 TABLET BY MOUTH ONCE DAILY FOR HIGH BLOOD PRESSURE 90 Tablet 3    famotidine (PEPCID) 40 MG Tab Take 1 Tablet by mouth 2 times a day. 180 Tablet 3    pantoprazole (PROTONIX) 40 MG Tablet Delayed Response Take 1 Tablet by mouth every day. 90 Tablet 3    simvastatin (ZOCOR) 20 MG Tab Take 1 Tablet by mouth every evening. 90 Tablet 3    losartan (COZAAR) 100 MG Tab Take 1 Tablet by mouth every day. 90 Tablet 3    dicyclomine (BENTYL) 10 MG Cap TAKE 1 CAPSULE BY MOUTH 4 TIMES DAILY BEFORE MEAL(S) AND AT BEDTIME 120 Capsule 3    estradiol (ESTRACE) 0.1 MG/GM vaginal cream INSERT 1 GRAM VAGINALLY ONCE DAILY 43 g 3    solifenacin (VESICARE) 10 MG tablet Take 1 Tablet by mouth every day. Brand name only--please dispense brand name as pt does not tolerate the generic version of medication, please dispense 90 Tablet 3    vitamin  "D (CHOLECALCIFEROL) 1000 UNIT Tab Take 1,000 Units by mouth every day.       No current facility-administered medications for this visit.     She  has a past medical history of GERD (gastroesophageal reflux disease), Hyperlipidemia, and Hypertension.    ROS  No chest pain, no shortness of breath, no abdominal pain       Objective:     /70   Pulse 95   Temp 36.1 °C (97 °F) (Temporal)   Resp 14   Ht 1.6 m (5' 3\")   Wt 70.6 kg (155 lb 9.6 oz)   SpO2 97%  Body mass index is 27.56 kg/m².   Physical Exam:  Constitutional: Alert, no distress, well-groomed.  Skin: No rashes in visible areas.  Eye: Round. Conjunctiva clear, lids normal. No icterus.   ENMT: Lips pink without lesions, good dentition, moist mucous membranes. Phonation normal.  Neck: No masses, no thyromegaly. Moves freely without pain.  Respiratory: Unlabored respiratory effort, no cough or audible wheeze  Psych: Alert and oriented x3, normal affect and mood.  Pelvic exam: normal external, no lesions, no vaginal lesions, no cervical lesions, no cervical motion tenderness, no adnexal masses, uterus normal size.         Assessment and Plan:   The following treatment plan was discussed    1. Gastroesophageal reflux disease without esophagitis  - famotidine (PEPCID) 40 MG Tab; Take 1 Tablet by mouth 2 times a day.  Dispense: 180 Tablet; Refill: 3  - pantoprazole (PROTONIX) 40 MG Tablet Delayed Response; Take 1 Tablet by mouth every day.  Dispense: 90 Tablet; Refill: 3  - dicyclomine (BENTYL) 10 MG Cap; TAKE 1 CAPSULE BY MOUTH 4 TIMES DAILY BEFORE MEAL(S) AND AT BEDTIME  Dispense: 120 Capsule; Refill: 3    2. Mixed hyperlipidemia  - simvastatin (ZOCOR) 20 MG Tab; Take 1 Tablet by mouth every evening.  Dispense: 90 Tablet; Refill: 3    3. Epigastric pain  - dicyclomine (BENTYL) 10 MG Cap; TAKE 1 CAPSULE BY MOUTH 4 TIMES DAILY BEFORE MEAL(S) AND AT BEDTIME  Dispense: 120 Capsule; Refill: 3    4. Vaginal atrophy  - estradiol (ESTRACE) 0.1 MG/GM vaginal " cream; INSERT 1 GRAM VAGINALLY ONCE DAILY  Dispense: 43 g; Refill: 3    5. Urinary frequency  - solifenacin (VESICARE) 10 MG tablet; Take 1 Tablet by mouth every day. Brand name only--please dispense brand name as pt does not tolerate the generic version of medication, please dispense  Dispense: 90 Tablet; Refill: 3    6. Pelvic pain    7. Stress incontinence of urine    Other orders  - amLODIPine (NORVASC) 5 MG Tab; TAKE 1 TABLET BY MOUTH ONCE DAILY FOR HIGH BLOOD PRESSURE  Dispense: 90 Tablet; Refill: 3  - losartan (COZAAR) 100 MG Tab; Take 1 Tablet by mouth every day.  Dispense: 90 Tablet; Refill: 3      Followup: Return in about 3 months (around 7/18/2023) for ur stress incont.

## 2023-04-18 NOTE — ASSESSMENT & PLAN NOTE
For about 2 months she has pelvic discomfort. No vaginal discharge. When standing and inserting vaginal estrogen applicator she felt it obstructed by something on vaginal wall.   She uses vaginal estrogen for post menopausal vaginal dryness and atrophy.     Pelvic exam: external no lesions, mild atrophy  Vaginal no lesions, no cervical lesions, no adnexal masses, uterus normal size.   Normal pelvic exam with speculum and bimanual examination.     Reassurance provided. Advised to monitor closely, if worsening symptoms will refer to gyn, check pelvic US

## 2023-04-27 DIAGNOSIS — K21.9 GASTROESOPHAGEAL REFLUX DISEASE WITHOUT ESOPHAGITIS: ICD-10-CM

## 2023-04-27 DIAGNOSIS — R10.13 EPIGASTRIC PAIN: ICD-10-CM

## 2023-04-27 RX ORDER — DICYCLOMINE HYDROCHLORIDE 10 MG/1
CAPSULE ORAL
Qty: 360 CAPSULE | Refills: 3 | Status: SHIPPED | OUTPATIENT
Start: 2023-04-27

## 2023-04-27 NOTE — TELEPHONE ENCOUNTER
Received request via: Patient    Was the patient seen in the last year in this department? Yes    Does the patient have an active prescription (recently filled or refills available) for medication(s) requested? No    Does the patient have custodial Plus and need 100 day supply (blood pressure, diabetes and cholesterol meds only)? Patient does not have SCP    Pt is requesting 90 day supply     Last office Visit: 04/18/2023  Last Labs:01/12/2023

## 2023-07-25 ENCOUNTER — HOSPITAL ENCOUNTER (OUTPATIENT)
Dept: LAB | Facility: MEDICAL CENTER | Age: 68
End: 2023-07-25
Attending: FAMILY MEDICINE
Payer: MEDICARE

## 2023-07-25 ENCOUNTER — OFFICE VISIT (OUTPATIENT)
Dept: MEDICAL GROUP | Facility: PHYSICIAN GROUP | Age: 68
End: 2023-07-25
Payer: MEDICARE

## 2023-07-25 VITALS
BODY MASS INDEX: 26.4 KG/M2 | OXYGEN SATURATION: 93 % | TEMPERATURE: 97.7 F | DIASTOLIC BLOOD PRESSURE: 80 MMHG | HEIGHT: 63 IN | HEART RATE: 110 BPM | WEIGHT: 149 LBS | RESPIRATION RATE: 14 BRPM | SYSTOLIC BLOOD PRESSURE: 122 MMHG

## 2023-07-25 DIAGNOSIS — R10.2 PELVIC PAIN: ICD-10-CM

## 2023-07-25 DIAGNOSIS — E55.9 VITAMIN D DEFICIENCY: ICD-10-CM

## 2023-07-25 DIAGNOSIS — M25.50 ARTHRALGIA, UNSPECIFIED JOINT: ICD-10-CM

## 2023-07-25 DIAGNOSIS — R23.4 SKIN TEXTURE CHANGES: ICD-10-CM

## 2023-07-25 DIAGNOSIS — M25.512 PAIN IN JOINT OF LEFT SHOULDER: ICD-10-CM

## 2023-07-25 PROBLEM — R10.84 GENERALIZED ABDOMINAL PAIN: Status: RESOLVED | Noted: 2018-11-14 | Resolved: 2023-07-25

## 2023-07-25 PROBLEM — N39.0 FREQUENT UTI: Status: RESOLVED | Noted: 2019-02-13 | Resolved: 2023-07-25

## 2023-07-25 LAB
25(OH)D3 SERPL-MCNC: 61 NG/ML (ref 30–100)
ALBUMIN SERPL BCP-MCNC: 4.5 G/DL (ref 3.2–4.9)
ALBUMIN/GLOB SERPL: 1.3 G/DL
ALP SERPL-CCNC: 227 U/L (ref 30–99)
ALT SERPL-CCNC: 33 U/L (ref 2–50)
ANION GAP SERPL CALC-SCNC: 11 MMOL/L (ref 7–16)
AST SERPL-CCNC: 19 U/L (ref 12–45)
BASOPHILS # BLD AUTO: 0.5 % (ref 0–1.8)
BASOPHILS # BLD: 0.05 K/UL (ref 0–0.12)
BILIRUB SERPL-MCNC: 0.5 MG/DL (ref 0.1–1.5)
BUN SERPL-MCNC: 15 MG/DL (ref 8–22)
CALCIUM ALBUM COR SERPL-MCNC: 9.7 MG/DL (ref 8.5–10.5)
CALCIUM SERPL-MCNC: 10.1 MG/DL (ref 8.5–10.5)
CHLORIDE SERPL-SCNC: 100 MMOL/L (ref 96–112)
CO2 SERPL-SCNC: 26 MMOL/L (ref 20–33)
CREAT SERPL-MCNC: 0.88 MG/DL (ref 0.5–1.4)
EOSINOPHIL # BLD AUTO: 0.16 K/UL (ref 0–0.51)
EOSINOPHIL NFR BLD: 1.7 % (ref 0–6.9)
ERYTHROCYTE [DISTWIDTH] IN BLOOD BY AUTOMATED COUNT: 43.4 FL (ref 35.9–50)
GFR SERPLBLD CREATININE-BSD FMLA CKD-EPI: 72 ML/MIN/1.73 M 2
GLOBULIN SER CALC-MCNC: 3.4 G/DL (ref 1.9–3.5)
GLUCOSE SERPL-MCNC: 106 MG/DL (ref 65–99)
HCT VFR BLD AUTO: 42.4 % (ref 37–47)
HGB BLD-MCNC: 13.5 G/DL (ref 12–16)
IMM GRANULOCYTES # BLD AUTO: 0.05 K/UL (ref 0–0.11)
IMM GRANULOCYTES NFR BLD AUTO: 0.5 % (ref 0–0.9)
LYMPHOCYTES # BLD AUTO: 1.61 K/UL (ref 1–4.8)
LYMPHOCYTES NFR BLD: 17.3 % (ref 22–41)
MCH RBC QN AUTO: 29.2 PG (ref 27–33)
MCHC RBC AUTO-ENTMCNC: 31.8 G/DL (ref 32.2–35.5)
MCV RBC AUTO: 91.8 FL (ref 81.4–97.8)
MONOCYTES # BLD AUTO: 0.85 K/UL (ref 0–0.85)
MONOCYTES NFR BLD AUTO: 9.1 % (ref 0–13.4)
NEUTROPHILS # BLD AUTO: 6.59 K/UL (ref 1.82–7.42)
NEUTROPHILS NFR BLD: 70.9 % (ref 44–72)
NRBC # BLD AUTO: 0 K/UL
NRBC BLD-RTO: 0 /100 WBC (ref 0–0.2)
PLATELET # BLD AUTO: 247 K/UL (ref 164–446)
PMV BLD AUTO: 13.2 FL (ref 9–12.9)
POTASSIUM SERPL-SCNC: 4.6 MMOL/L (ref 3.6–5.5)
PROT SERPL-MCNC: 7.9 G/DL (ref 6–8.2)
RBC # BLD AUTO: 4.62 M/UL (ref 4.2–5.4)
SODIUM SERPL-SCNC: 137 MMOL/L (ref 135–145)
TSH SERPL DL<=0.005 MIU/L-ACNC: 3.41 UIU/ML (ref 0.38–5.33)
WBC # BLD AUTO: 9.3 K/UL (ref 4.8–10.8)

## 2023-07-25 PROCEDURE — 82306 VITAMIN D 25 HYDROXY: CPT

## 2023-07-25 PROCEDURE — 84443 ASSAY THYROID STIM HORMONE: CPT | Mod: GA

## 2023-07-25 PROCEDURE — 86038 ANTINUCLEAR ANTIBODIES: CPT

## 2023-07-25 PROCEDURE — 85025 COMPLETE CBC W/AUTO DIFF WBC: CPT

## 2023-07-25 PROCEDURE — 99214 OFFICE O/P EST MOD 30 MIN: CPT | Performed by: FAMILY MEDICINE

## 2023-07-25 PROCEDURE — 80053 COMPREHEN METABOLIC PANEL: CPT

## 2023-07-25 PROCEDURE — 3079F DIAST BP 80-89 MM HG: CPT | Performed by: FAMILY MEDICINE

## 2023-07-25 PROCEDURE — 3074F SYST BP LT 130 MM HG: CPT | Performed by: FAMILY MEDICINE

## 2023-07-25 PROCEDURE — 36415 COLL VENOUS BLD VENIPUNCTURE: CPT

## 2023-07-25 ASSESSMENT — FIBROSIS 4 INDEX: FIB4 SCORE: 1.38

## 2023-07-25 NOTE — ASSESSMENT & PLAN NOTE
Some pelvic discomfort.   Chronic  Improved with estradiol    Had normal pelvic exam.   Will check ua and culture

## 2023-07-25 NOTE — ASSESSMENT & PLAN NOTE
For a few months left arm, right knee, wrists with sharp pain, burning.   She feel she is falling apart.   Will check tsh, vit d, cbc, cmp, lesli

## 2023-07-25 NOTE — ASSESSMENT & PLAN NOTE
Over last 2-3 months she has skin on neck feeling rough like sandpaper and hair loss and scattered red rashes on neck.   Recheck thyroid cbc, cmp, check lesli  She is double jointed

## 2023-07-25 NOTE — PROGRESS NOTES
Subjective:   Ruth Shoemaker is a 67 y.o. female here today for evaluation and management of:     Pelvic pain  Some pelvic discomfort.   Chronic  Improved with estradiol    Had normal pelvic exam.   Will check ua and culture    Pain in joint of left shoulder  For a few months left arm, right knee, wrists with sharp pain, burning.   She feel she is falling apart.   Will check tsh, vit d, cbc, cmp, lesli    Skin texture changes  Over last 2-3 months she has skin on neck feeling rough like sandpaper and hair loss and scattered red rashes on neck.   Recheck thyroid cbc, cmp, check lesli  She is double jointed             Current medicines (including changes today)  Current Outpatient Medications   Medication Sig Dispense Refill    dicyclomine (BENTYL) 10 MG Cap TAKE 1 CAPSULE BY MOUTH 4 TIMES DAILY BEFORE MEAL(S) AND AT BEDTIME 360 Capsule 3    amLODIPine (NORVASC) 5 MG Tab TAKE 1 TABLET BY MOUTH ONCE DAILY FOR HIGH BLOOD PRESSURE 90 Tablet 3    famotidine (PEPCID) 40 MG Tab Take 1 Tablet by mouth 2 times a day. 180 Tablet 3    pantoprazole (PROTONIX) 40 MG Tablet Delayed Response Take 1 Tablet by mouth every day. 90 Tablet 3    simvastatin (ZOCOR) 20 MG Tab Take 1 Tablet by mouth every evening. 90 Tablet 3    losartan (COZAAR) 100 MG Tab Take 1 Tablet by mouth every day. 90 Tablet 3    estradiol (ESTRACE) 0.1 MG/GM vaginal cream INSERT 1 GRAM VAGINALLY ONCE DAILY 43 g 3    solifenacin (VESICARE) 10 MG tablet Take 1 Tablet by mouth every day. Brand name only--please dispense brand name as pt does not tolerate the generic version of medication, please dispense 90 Tablet 3    vitamin D (CHOLECALCIFEROL) 1000 UNIT Tab Take 1,000 Units by mouth every day.       No current facility-administered medications for this visit.     She  has a past medical history of GERD (gastroesophageal reflux disease), Hyperlipidemia, and Hypertension.    ROS  No chest pain, no shortness of breath, no abdominal pain       Objective:     BP  "122/80 (BP Location: Left arm, Patient Position: Sitting, BP Cuff Size: Adult)   Pulse (!) 110   Temp 36.5 °C (97.7 °F) (Temporal)   Resp 14   Ht 1.6 m (5' 3\")   Wt 67.6 kg (149 lb)   SpO2 93%  Body mass index is 26.39 kg/m².   Physical Exam:  Constitutional: Alert, no distress, well-groomed.  Skin: No rashes in visible areas.  Eye: Round. Conjunctiva clear, lids normal. No icterus.   ENMT: Lips pink without lesions, good dentition, moist mucous membranes. Phonation normal.  Neck: No masses, no thyromegaly. Moves freely without pain.  Respiratory: Unlabored respiratory effort, no cough or audible wheeze  Psych: Alert and oriented x3, normal affect and mood.          Assessment and Plan:   The following treatment plan was discussed    1. Pelvic pain  - URINALYSIS; Future  - URINE CULTURE(NEW); Future    2. Vitamin D deficiency  - VITAMIN D,25 HYDROXY (DEFICIENCY); Future    3. Arthralgia, unspecified joint  - TSH WITH REFLEX TO FT4; Future  - CBC WITH DIFFERENTIAL; Future  - Comp Metabolic Panel; Future  - JIMMY REFLEXIVE PROFILE; Future    4. Pain in joint of left shoulder    5. Skin texture changes  - Referral to Dermatology      Followup: Return in about 3 months (around 10/25/2023) for Lab Review skin changes, arthralgia.           "

## 2023-07-26 ENCOUNTER — HOSPITAL ENCOUNTER (OUTPATIENT)
Facility: MEDICAL CENTER | Age: 68
End: 2023-07-26
Attending: FAMILY MEDICINE
Payer: MEDICARE

## 2023-07-26 LAB
APPEARANCE UR: CLEAR
BACTERIA #/AREA URNS HPF: ABNORMAL /HPF
BILIRUB UR QL STRIP.AUTO: NEGATIVE
CAOX CRY #/AREA URNS HPF: ABNORMAL /HPF
COLOR UR: YELLOW
EPI CELLS #/AREA URNS HPF: NEGATIVE /HPF
GLUCOSE UR STRIP.AUTO-MCNC: NEGATIVE MG/DL
HYALINE CASTS #/AREA URNS LPF: ABNORMAL /LPF
KETONES UR STRIP.AUTO-MCNC: NEGATIVE MG/DL
LEUKOCYTE ESTERASE UR QL STRIP.AUTO: NEGATIVE
MICRO URNS: ABNORMAL
NITRITE UR QL STRIP.AUTO: POSITIVE
PH UR STRIP.AUTO: 5.5 [PH] (ref 5–8)
PROT UR QL STRIP: NEGATIVE MG/DL
RBC # URNS HPF: ABNORMAL /HPF
RBC UR QL AUTO: NEGATIVE
SP GR UR STRIP.AUTO: 1.02
UROBILINOGEN UR STRIP.AUTO-MCNC: 0.2 MG/DL
WBC #/AREA URNS HPF: ABNORMAL /HPF

## 2023-07-26 PROCEDURE — 87186 SC STD MICRODIL/AGAR DIL: CPT

## 2023-07-26 PROCEDURE — 87077 CULTURE AEROBIC IDENTIFY: CPT

## 2023-07-26 PROCEDURE — 87086 URINE CULTURE/COLONY COUNT: CPT

## 2023-07-26 PROCEDURE — 81001 URINALYSIS AUTO W/SCOPE: CPT

## 2023-07-27 LAB — NUCLEAR IGG SER QL IA: NORMAL

## 2023-08-02 DIAGNOSIS — R73.01 ELEVATED FASTING GLUCOSE: ICD-10-CM

## 2023-08-02 DIAGNOSIS — R74.8 ELEVATED ALKALINE PHOSPHATASE LEVEL: ICD-10-CM

## 2023-08-02 RX ORDER — SULFAMETHOXAZOLE AND TRIMETHOPRIM 800; 160 MG/1; MG/1
1 TABLET ORAL 2 TIMES DAILY
Qty: 10 TABLET | Refills: 0 | Status: SHIPPED | OUTPATIENT
Start: 2023-08-02 | End: 2023-08-07

## 2023-08-02 NOTE — RESULT ENCOUNTER NOTE
Please advise patient that urine culture showed an ecoli UTI, rx for bactrim antibiotic sent to [facility].   Iva Hopkins M.D.

## 2023-09-14 DIAGNOSIS — Z78.0 POSTMENOPAUSAL STATUS (AGE-RELATED) (NATURAL): ICD-10-CM

## 2023-09-14 DIAGNOSIS — N95.1 MENOPAUSAL STATE: ICD-10-CM

## 2023-10-14 ENCOUNTER — HOSPITAL ENCOUNTER (OUTPATIENT)
Dept: LAB | Facility: MEDICAL CENTER | Age: 68
End: 2023-10-14
Attending: FAMILY MEDICINE
Payer: MEDICARE

## 2023-10-14 DIAGNOSIS — R73.01 ELEVATED FASTING GLUCOSE: ICD-10-CM

## 2023-10-14 DIAGNOSIS — R74.8 ELEVATED ALKALINE PHOSPHATASE LEVEL: ICD-10-CM

## 2023-10-14 LAB
EST. AVERAGE GLUCOSE BLD GHB EST-MCNC: 114 MG/DL
HBA1C MFR BLD: 5.6 % (ref 4–5.6)

## 2023-10-14 PROCEDURE — 83036 HEMOGLOBIN GLYCOSYLATED A1C: CPT | Mod: GA

## 2023-10-14 PROCEDURE — 84080 ASSAY ALKALINE PHOSPHATASES: CPT

## 2023-10-14 PROCEDURE — 36415 COLL VENOUS BLD VENIPUNCTURE: CPT | Mod: GA

## 2023-10-14 PROCEDURE — 84075 ASSAY ALKALINE PHOSPHATASE: CPT

## 2023-10-17 ENCOUNTER — OFFICE VISIT (OUTPATIENT)
Dept: MEDICAL GROUP | Facility: PHYSICIAN GROUP | Age: 68
End: 2023-10-17
Payer: MEDICARE

## 2023-10-17 VITALS
TEMPERATURE: 98.5 F | WEIGHT: 149.6 LBS | DIASTOLIC BLOOD PRESSURE: 86 MMHG | RESPIRATION RATE: 18 BRPM | HEIGHT: 63 IN | BODY MASS INDEX: 26.51 KG/M2 | SYSTOLIC BLOOD PRESSURE: 124 MMHG | OXYGEN SATURATION: 95 % | HEART RATE: 90 BPM

## 2023-10-17 DIAGNOSIS — Z23 NEED FOR VACCINATION: ICD-10-CM

## 2023-10-17 DIAGNOSIS — M81.0 AGE-RELATED OSTEOPOROSIS WITHOUT CURRENT PATHOLOGICAL FRACTURE: ICD-10-CM

## 2023-10-17 DIAGNOSIS — N39.3 STRESS INCONTINENCE OF URINE: ICD-10-CM

## 2023-10-17 DIAGNOSIS — N95.0 POSTMENOPAUSAL VAGINAL BLEEDING: ICD-10-CM

## 2023-10-17 DIAGNOSIS — R10.2 PELVIC PAIN: ICD-10-CM

## 2023-10-17 PROCEDURE — 90662 IIV NO PRSV INCREASED AG IM: CPT | Performed by: FAMILY MEDICINE

## 2023-10-17 PROCEDURE — G0008 ADMIN INFLUENZA VIRUS VAC: HCPCS | Performed by: FAMILY MEDICINE

## 2023-10-17 PROCEDURE — 3079F DIAST BP 80-89 MM HG: CPT | Performed by: FAMILY MEDICINE

## 2023-10-17 PROCEDURE — 99214 OFFICE O/P EST MOD 30 MIN: CPT | Mod: 25 | Performed by: FAMILY MEDICINE

## 2023-10-17 PROCEDURE — 3074F SYST BP LT 130 MM HG: CPT | Performed by: FAMILY MEDICINE

## 2023-10-17 RX ORDER — ALENDRONATE SODIUM 70 MG/1
70 TABLET ORAL
Qty: 12 TABLET | Refills: 3 | Status: SHIPPED | OUTPATIENT
Start: 2023-10-17 | End: 2024-01-26

## 2023-10-17 RX ORDER — AMLODIPINE BESYLATE 5 MG/1
TABLET ORAL
Qty: 90 TABLET | Refills: 3 | Status: SHIPPED | OUTPATIENT
Start: 2023-10-17

## 2023-10-17 ASSESSMENT — FIBROSIS 4 INDEX: FIB4 SCORE: 0.88

## 2023-10-17 NOTE — ASSESSMENT & PLAN NOTE
She had one episode of vaginal bleeding and had bleeding for a week and it completely resolved. This was about 3 months ago.   She has been on estradiol for a few years.   Recent pelvic exam in clinic was normal.

## 2023-10-17 NOTE — ASSESSMENT & PLAN NOTE
Patient had fracture of right fibula near her knee when she tripped on her husbands boots last year.   Dexa scan August 2023 showed osteoporosis.   I advised her to start calcium 500mg, vitamin D 1000, regular weight bearing exercises like walking with light weights.   Alendronate prescription provided.   Advised on side effects.

## 2023-10-17 NOTE — PROGRESS NOTES
Subjective:   Ruth Shoemaker is a 67 y.o. female here today for evaluation and management of:     Age-related osteoporosis without current pathological fracture  Patient had fracture of right fibula near her knee when she tripped on her husbands boots last year.   Dexa scan August 2023 showed osteoporosis.   I advised her to start calcium 500mg, vitamin D 1000, regular weight bearing exercises like walking with light weights.   Alendronate prescription provided.   Advised on side effects.     Stress incontinence of urine  Overactive bladder and stress incontinence 90% improved with kegels.   Encouraged to empty her bladder regularly.       Pelvic pain  She had one episode of vaginal bleeding and had bleeding for a week and it completely resolved. This was about 3 months ago.   She has been on estradiol for a few years.   Recent pelvic exam in clinic was normal.              Current medicines (including changes today)  Current Outpatient Medications   Medication Sig Dispense Refill    amLODIPine (NORVASC) 5 MG Tab TAKE 1 TABLET BY MOUTH ONCE DAILY FOR HIGH BLOOD PRESSURE 90 Tablet 3    alendronate (FOSAMAX) 70 MG Tab Take 1 Tablet by mouth every 7 days. 12 Tablet 3    dicyclomine (BENTYL) 10 MG Cap TAKE 1 CAPSULE BY MOUTH 4 TIMES DAILY BEFORE MEAL(S) AND AT BEDTIME 360 Capsule 3    famotidine (PEPCID) 40 MG Tab Take 1 Tablet by mouth 2 times a day. 180 Tablet 3    pantoprazole (PROTONIX) 40 MG Tablet Delayed Response Take 1 Tablet by mouth every day. 90 Tablet 3    simvastatin (ZOCOR) 20 MG Tab Take 1 Tablet by mouth every evening. 90 Tablet 3    losartan (COZAAR) 100 MG Tab Take 1 Tablet by mouth every day. 90 Tablet 3    estradiol (ESTRACE) 0.1 MG/GM vaginal cream INSERT 1 GRAM VAGINALLY ONCE DAILY 43 g 3    solifenacin (VESICARE) 10 MG tablet Take 1 Tablet by mouth every day. Brand name only--please dispense brand name as pt does not tolerate the generic version of medication, please dispense 90 Tablet 3     "vitamin D (CHOLECALCIFEROL) 1000 UNIT Tab Take 1,000 Units by mouth every day.       No current facility-administered medications for this visit.     She  has a past medical history of GERD (gastroesophageal reflux disease), Hyperlipidemia, and Hypertension.    ROS  No chest pain, no shortness of breath, no abdominal pain       Objective:     /86   Pulse 90   Temp 36.9 °C (98.5 °F) (Temporal)   Resp 18   Ht 1.6 m (5' 3\")   Wt 67.9 kg (149 lb 9.6 oz)   SpO2 95%  Body mass index is 26.5 kg/m².   Physical Exam:  Constitutional: Alert, no distress.  Skin: Warm, dry, good turgor, no rashes in visible areas.  Eye: Equal, round and reactive, conjunctiva clear, lids normal.  ENMT: Lips without lesions, good dentition, oropharynx clear.  Neck: Trachea midline, no masses, no thyromegaly. No cervical or supraclavicular lymphadenopathy  Respiratory: Unlabored respiratory effort, lungs clear to auscultation, no wheezes, no ronchi.  Cardiovascular: Normal S1, S2, no murmur, no edema.  Abdomen: Soft, non-tender, no masses, no hepatosplenomegaly.  Psych: Alert and oriented x3, normal affect and mood.        Assessment and Plan:   The following treatment plan was discussed    1. Need for vaccination  - Influenza Vaccine, High Dose (65+ Only)    2. Age-related osteoporosis without current pathological fracture    3. Stress incontinence of urine    4. Pelvic pain    5. Postmenopausal vaginal bleeding  - US-PELVIC COMPLETE (TRANSABDOMINAL/TRANSVAGINAL) (COMBO); Future    Other orders  - amLODIPine (NORVASC) 5 MG Tab; TAKE 1 TABLET BY MOUTH ONCE DAILY FOR HIGH BLOOD PRESSURE  Dispense: 90 Tablet; Refill: 3  - alendronate (FOSAMAX) 70 MG Tab; Take 1 Tablet by mouth every 7 days.  Dispense: 12 Tablet; Refill: 3      Followup: Return in about 3 months (around 1/17/2024) for please fax order for pelvic US to [facility].           "

## 2023-10-18 LAB
ALP BONE SERPL-CCNC: 33 U/L (ref 0–55)
ALP ISOS SERPL HS-CCNC: 0 U/L
ALP LIVER SERPL-CCNC: 55 U/L (ref 0–94)
ALP SERPL-CCNC: 88 U/L (ref 40–120)

## 2023-12-22 NOTE — PROCEDURE: LIQUID NITROGEN
"Daily Note     Today's date: 2023  Patient name: Halfway SUNSHINE Valdez  : 1968  MRN: [id number]  Referring provider: Donato Linder DPM  Dx:   Encounter Diagnosis     ICD-10-CM    1. S/P Achilles tendon repair  Z98.890                      Subjective: Pt reports improvement in pain after lv.       Objective: See treatment diary below      Assessment: Tolerated treatment well. Patient would benefit from continued PT. Focused on distal tendon mobilization today. Continue to strengthen as tolerated.       Plan: Continue per plan of care.      Precautions: Wbing in boot      Manuals 11/21 11/28 12/8 12/12 12/15 12/19 12/22      Gastroc stretch/STM nv GH CB CB CB CB CB                                             Neuro Re-Ed             Tband weight shifts reviewed            VG DL squats      X20 35% CB      VG DL heel raise      X20 35% CB                                                          Ther Ex             Tband Eversion reviewed BTB In/ev 5\"x10 each CB CB CB CB CB      Tband PF reviewed BTB 5\"x10 CB CB CB CB       Iso ankle inv reviewed 5\"x10 CB CB CB CB       Seated heel raise nv 5\"x10 NV 2x10  CB CB CB      SLR flexion nv 5\"x10 NV nv         SLR abd nv 5\"x5 NV nv         bike nv 5 min L1 CB 7' L1 CB CB CB      Standing soleus stretch     10x10: CB CB      Ther Activity                                       Gait Training                                       Modalities                                                      "
Post-Care Instructions: I reviewed with the patient in detail post-care instructions. Patient is to wear sunprotection, and avoid picking at any of the treated lesions. Pt may apply Vaseline to crusted or scabbing areas.
Number Of Freeze-Thaw Cycles: 1 freeze-thaw cycle
Render Note In Bullet Format When Appropriate: No
Duration Of Freeze Thaw-Cycle (Seconds): 0
Detail Level: Detailed
Consent: The patient's consent was obtained including but not limited to risks of crusting, scabbing, blistering, scarring, darker or lighter pigmentary change, recurrence, incomplete removal and infection.

## 2024-01-26 ENCOUNTER — HOSPITAL ENCOUNTER (OUTPATIENT)
Dept: LAB | Facility: MEDICAL CENTER | Age: 69
End: 2024-01-26
Attending: FAMILY MEDICINE
Payer: MEDICARE

## 2024-01-26 ENCOUNTER — OFFICE VISIT (OUTPATIENT)
Dept: MEDICAL GROUP | Facility: PHYSICIAN GROUP | Age: 69
End: 2024-01-26
Payer: MEDICARE

## 2024-01-26 ENCOUNTER — APPOINTMENT (OUTPATIENT)
Dept: RADIOLOGY | Facility: IMAGING CENTER | Age: 69
End: 2024-01-26
Attending: FAMILY MEDICINE
Payer: MEDICARE

## 2024-01-26 ENCOUNTER — NON-PROVIDER VISIT (OUTPATIENT)
Dept: URGENT CARE | Facility: PHYSICIAN GROUP | Age: 69
End: 2024-01-26
Payer: MEDICARE

## 2024-01-26 VITALS
RESPIRATION RATE: 16 BRPM | HEIGHT: 63 IN | WEIGHT: 153 LBS | DIASTOLIC BLOOD PRESSURE: 78 MMHG | SYSTOLIC BLOOD PRESSURE: 122 MMHG | BODY MASS INDEX: 27.11 KG/M2 | TEMPERATURE: 98 F | OXYGEN SATURATION: 94 % | HEART RATE: 84 BPM

## 2024-01-26 DIAGNOSIS — M79.602 LEFT ARM PAIN: ICD-10-CM

## 2024-01-26 DIAGNOSIS — M89.8X9 BONE PAIN: ICD-10-CM

## 2024-01-26 DIAGNOSIS — Z87.891 HISTORY OF SMOKING FOR 6-10 YEARS: ICD-10-CM

## 2024-01-26 DIAGNOSIS — R74.8 ELEVATED ALKALINE PHOSPHATASE LEVEL: ICD-10-CM

## 2024-01-26 LAB
ALBUMIN SERPL BCP-MCNC: 4.9 G/DL (ref 3.2–4.9)
ALBUMIN/GLOB SERPL: 1.6 G/DL
ALP SERPL-CCNC: 86 U/L (ref 30–99)
ALT SERPL-CCNC: 31 U/L (ref 2–50)
ANION GAP SERPL CALC-SCNC: 11 MMOL/L (ref 7–16)
AST SERPL-CCNC: 27 U/L (ref 12–45)
BILIRUB SERPL-MCNC: 0.6 MG/DL (ref 0.1–1.5)
BUN SERPL-MCNC: 14 MG/DL (ref 8–22)
CALCIUM ALBUM COR SERPL-MCNC: 9.2 MG/DL (ref 8.5–10.5)
CALCIUM SERPL-MCNC: 9.9 MG/DL (ref 8.5–10.5)
CHLORIDE SERPL-SCNC: 105 MMOL/L (ref 96–112)
CO2 SERPL-SCNC: 27 MMOL/L (ref 20–33)
CREAT SERPL-MCNC: 0.83 MG/DL (ref 0.5–1.4)
GFR SERPLBLD CREATININE-BSD FMLA CKD-EPI: 77 ML/MIN/1.73 M 2
GGT SERPL-CCNC: 145 U/L (ref 7–34)
GLOBULIN SER CALC-MCNC: 3 G/DL (ref 1.9–3.5)
GLUCOSE SERPL-MCNC: 104 MG/DL (ref 65–99)
POTASSIUM SERPL-SCNC: 3.9 MMOL/L (ref 3.6–5.5)
PROT SERPL-MCNC: 7.9 G/DL (ref 6–8.2)
SODIUM SERPL-SCNC: 143 MMOL/L (ref 135–145)

## 2024-01-26 PROCEDURE — 3078F DIAST BP <80 MM HG: CPT | Performed by: FAMILY MEDICINE

## 2024-01-26 PROCEDURE — 82977 ASSAY OF GGT: CPT

## 2024-01-26 PROCEDURE — 99214 OFFICE O/P EST MOD 30 MIN: CPT | Performed by: FAMILY MEDICINE

## 2024-01-26 PROCEDURE — 3074F SYST BP LT 130 MM HG: CPT | Performed by: FAMILY MEDICINE

## 2024-01-26 PROCEDURE — 36415 COLL VENOUS BLD VENIPUNCTURE: CPT

## 2024-01-26 PROCEDURE — 80053 COMPREHEN METABOLIC PANEL: CPT

## 2024-01-26 PROCEDURE — 73060 X-RAY EXAM OF HUMERUS: CPT | Mod: TC,FY,LT | Performed by: FAMILY MEDICINE

## 2024-01-26 ASSESSMENT — FIBROSIS 4 INDEX: FIB4 SCORE: 0.9

## 2024-01-26 ASSESSMENT — PATIENT HEALTH QUESTIONNAIRE - PHQ9: CLINICAL INTERPRETATION OF PHQ2 SCORE: 0

## 2024-01-26 NOTE — ASSESSMENT & PLAN NOTE
Smoked 1/2 pack for about total 10 years also has current second hand smoke exposure. Her  smokes and smokes in the car and in the house.

## 2024-01-26 NOTE — PROGRESS NOTES
Subjective:   Ruth Shoemaker is a 68 y.o. 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   Full ROM intact, no swelling, redness, masses or LAD.   Will stop alendronate reassess in 3 months   Has elevated alk phos but normal iso enzymes both bone and liver. Repeat labs ordered with repeat ggt.     History of smoking for 6-10 years  Smoked 1/2 pack for about total 10 years also has current second hand smoke exposure. Her  smokes and smokes in the car and in the house.     Elevated alkaline phosphatase level  Isolated elevated alk phos  Has other lfts normal,   Has normal alk phos iso enzymes for liver and bone.   Repeat labs after stopping alendronate as it is causing left arm pain.              Current medicines (including changes today)  Current Outpatient Medications   Medication Sig Dispense Refill    amLODIPine (NORVASC) 5 MG Tab TAKE 1 TABLET BY MOUTH ONCE DAILY FOR HIGH BLOOD PRESSURE 90 Tablet 3    alendronate (FOSAMAX) 70 MG Tab Take 1 Tablet by mouth every 7 days. 12 Tablet 3    dicyclomine (BENTYL) 10 MG Cap TAKE 1 CAPSULE BY MOUTH 4 TIMES DAILY BEFORE MEAL(S) AND AT BEDTIME 360 Capsule 3    famotidine (PEPCID) 40 MG Tab Take 1 Tablet by mouth 2 times a day. 180 Tablet 3    pantoprazole (PROTONIX) 40 MG Tablet Delayed Response Take 1 Tablet by mouth every day. 90 Tablet 3    simvastatin (ZOCOR) 20 MG Tab Take 1 Tablet by mouth every evening. 90 Tablet 3    losartan (COZAAR) 100 MG Tab Take 1 Tablet by mouth every day. 90 Tablet 3    estradiol (ESTRACE) 0.1 MG/GM vaginal cream INSERT 1 GRAM VAGINALLY ONCE DAILY 43 g 3    solifenacin (VESICARE) 10 MG tablet Take 1 Tablet by mouth every day. Brand name only--please dispense brand name as pt does not tolerate the generic version of medication, please  "dispense 90 Tablet 3    vitamin D (CHOLECALCIFEROL) 1000 UNIT Tab Take 1,000 Units by mouth every day.       No current facility-administered medications for this visit.     She  has a past medical history of GERD (gastroesophageal reflux disease), Hyperlipidemia, and Hypertension.    ROS  No chest pain, no shortness of breath, no abdominal pain       Objective:     /78 (BP Location: Left arm, Patient Position: Sitting, BP Cuff Size: Adult)   Pulse 84   Temp 36.7 °C (98 °F) (Temporal)   Resp 16   Ht 1.6 m (5' 3\")   Wt 69.4 kg (153 lb)   SpO2 94%  Body mass index is 27.1 kg/m².   Physical Exam:  Constitutional: Alert, no distress.  Skin: Warm, dry, good turgor, no rashes in visible areas.  Eye: Equal, round and reactive, conjunctiva clear, lids normal.  ENMT: Lips without lesions, good dentition, oropharynx clear.  Neck: Trachea midline, no masses, no thyromegaly. No cervical or supraclavicular lymphadenopathy  Respiratory: Unlabored respiratory effort, lungs clear to auscultation, no wheezes, no ronchi.  Cardiovascular: Normal S1, S2, no murmur, no edema.  Abdomen: Soft, non-tender, no masses, no hepatosplenomegaly.  Psych: Alert and oriented x3, normal affect and mood.        Assessment and Plan:   The following treatment plan was discussed    1. Elevated alkaline phosphatase level  - Comp Metabolic Panel; Future  - GAMMA GT (GGT); Future    2. Bone pain    3. Left arm pain  - DX-HUMERUS 2+ LEFT; Future    4. History of smoking for 6-10 years      Followup: No follow-ups on file.           "

## 2024-01-26 NOTE — ASSESSMENT & PLAN NOTE
Left deltoid region upper arm pain x 3 months since flu shot in that area and since starting alendronate for osteoporosis in Oct 13990  Unable to do her house decorating for seasons.   Full ROM intact, no swelling, redness, masses or LAD.   Will stop alendronate reassess in 3 months   Has elevated alk phos but normal iso enzymes both bone and liver. Repeat labs ordered with repeat ggt.

## 2024-01-26 NOTE — ASSESSMENT & PLAN NOTE
Isolated elevated alk phos  Has other lfts normal,   Has normal alk phos iso enzymes for liver and bone.   Repeat labs after stopping alendronate as it is causing left arm pain.

## 2024-03-31 DIAGNOSIS — E78.2 MIXED HYPERLIPIDEMIA: ICD-10-CM

## 2024-03-31 DIAGNOSIS — K21.9 GASTROESOPHAGEAL REFLUX DISEASE WITHOUT ESOPHAGITIS: ICD-10-CM

## 2024-03-31 DIAGNOSIS — N95.2 VAGINAL ATROPHY: ICD-10-CM

## 2024-04-02 NOTE — TELEPHONE ENCOUNTER
Requested Prescriptions     Pending Prescriptions Disp Refills    simvastatin (ZOCOR) 20 MG Tab [Pharmacy Med Name: Simvastatin 20 MG Oral Tablet] 90 Tablet 0     Sig: TAKE 1 TABLET BY MOUTH ONCE DAILY IN THE EVENING    pantoprazole (PROTONIX) 40 MG Tablet Delayed Response [Pharmacy Med Name: Pantoprazole Sodium 40 MG Oral Tablet Delayed Release] 90 Tablet 0     Sig: Take 1 tablet by mouth once daily    famotidine (PEPCID) 40 MG Tab [Pharmacy Med Name: Famotidine 40 MG Oral Tablet] 180 Tablet 0     Sig: Take 1 tablet by mouth twice daily    losartan (COZAAR) 100 MG Tab [Pharmacy Med Name: Losartan Potassium 100 MG Oral Tablet] 90 Tablet 0     Sig: Take 1 tablet by mouth once daily    estradiol (ESTRACE) 0.1 MG/GM vaginal cream [Pharmacy Med Name: Estradiol 0.1 MG/GM Vaginal Cream] 43 g 0     Sig: INSERT 1 GRAM VAGINALLY ONCE DAILY      Last office visit: 1/26/24  Last lab: 1/26/24

## 2024-04-09 RX ORDER — FAMOTIDINE 40 MG/1
40 TABLET, FILM COATED ORAL 2 TIMES DAILY
Qty: 180 TABLET | Refills: 3 | Status: SHIPPED | OUTPATIENT
Start: 2024-04-09

## 2024-04-09 RX ORDER — SIMVASTATIN 20 MG
20 TABLET ORAL EVERY EVENING
Qty: 90 TABLET | Refills: 3 | Status: SHIPPED | OUTPATIENT
Start: 2024-04-09

## 2024-04-09 RX ORDER — PANTOPRAZOLE SODIUM 40 MG/1
40 TABLET, DELAYED RELEASE ORAL DAILY
Qty: 90 TABLET | Refills: 3 | Status: SHIPPED | OUTPATIENT
Start: 2024-04-09

## 2024-04-09 RX ORDER — ESTRADIOL 0.1 MG/G
CREAM VAGINAL
Qty: 43 G | Refills: 3 | Status: SHIPPED | OUTPATIENT
Start: 2024-04-09

## 2024-04-09 RX ORDER — LOSARTAN POTASSIUM 100 MG/1
100 TABLET ORAL DAILY
Qty: 90 TABLET | Refills: 3 | Status: SHIPPED | OUTPATIENT
Start: 2024-04-09

## 2024-05-30 ENCOUNTER — OFFICE VISIT (OUTPATIENT)
Dept: MEDICAL GROUP | Facility: PHYSICIAN GROUP | Age: 69
End: 2024-05-30
Payer: MEDICARE

## 2024-05-30 VITALS
DIASTOLIC BLOOD PRESSURE: 80 MMHG | SYSTOLIC BLOOD PRESSURE: 120 MMHG | BODY MASS INDEX: 27.29 KG/M2 | HEART RATE: 80 BPM | TEMPERATURE: 98.2 F | HEIGHT: 63 IN | WEIGHT: 154 LBS | RESPIRATION RATE: 16 BRPM | OXYGEN SATURATION: 96 %

## 2024-05-30 DIAGNOSIS — R73.01 ELEVATED FASTING GLUCOSE: ICD-10-CM

## 2024-05-30 DIAGNOSIS — N95.2 VAGINAL ATROPHY: ICD-10-CM

## 2024-05-30 DIAGNOSIS — Z12.4 SCREENING FOR CERVICAL CANCER: ICD-10-CM

## 2024-05-30 DIAGNOSIS — Z77.22 SECOND HAND SMOKE EXPOSURE: ICD-10-CM

## 2024-05-30 DIAGNOSIS — Z12.11 COLON CANCER SCREENING: ICD-10-CM

## 2024-05-30 DIAGNOSIS — R10.13 EPIGASTRIC PAIN: ICD-10-CM

## 2024-05-30 DIAGNOSIS — E78.2 MIXED HYPERLIPIDEMIA: ICD-10-CM

## 2024-05-30 DIAGNOSIS — K21.9 GASTROESOPHAGEAL REFLUX DISEASE WITHOUT ESOPHAGITIS: ICD-10-CM

## 2024-05-30 DIAGNOSIS — N39.3 STRESS INCONTINENCE OF URINE: ICD-10-CM

## 2024-05-30 DIAGNOSIS — E55.9 VITAMIN D DEFICIENCY: ICD-10-CM

## 2024-05-30 DIAGNOSIS — R74.8 ELEVATED ALKALINE PHOSPHATASE LEVEL: ICD-10-CM

## 2024-05-30 DIAGNOSIS — R35.0 URINARY FREQUENCY: ICD-10-CM

## 2024-05-30 DIAGNOSIS — R74.8 ELEVATED SERUM GGT LEVEL: ICD-10-CM

## 2024-05-30 DIAGNOSIS — M81.0 AGE-RELATED OSTEOPOROSIS WITHOUT CURRENT PATHOLOGICAL FRACTURE: ICD-10-CM

## 2024-05-30 PROCEDURE — 3074F SYST BP LT 130 MM HG: CPT | Performed by: FAMILY MEDICINE

## 2024-05-30 PROCEDURE — 3079F DIAST BP 80-89 MM HG: CPT | Performed by: FAMILY MEDICINE

## 2024-05-30 PROCEDURE — G0438 PPPS, INITIAL VISIT: HCPCS | Performed by: FAMILY MEDICINE

## 2024-05-30 RX ORDER — ESTRADIOL 0.1 MG/G
CREAM VAGINAL
Qty: 86 G | Refills: 3 | Status: SHIPPED | OUTPATIENT
Start: 2024-05-30

## 2024-05-30 RX ORDER — ALENDRONATE SODIUM 70 MG/1
70 TABLET ORAL
Qty: 12 TABLET | Refills: 3 | Status: SHIPPED | OUTPATIENT
Start: 2024-05-30

## 2024-05-30 ASSESSMENT — FIBROSIS 4 INDEX: FIB4 SCORE: 1.32

## 2024-05-30 ASSESSMENT — ACTIVITIES OF DAILY LIVING (ADL): BATHING_REQUIRES_ASSISTANCE: 0

## 2024-05-30 ASSESSMENT — PATIENT HEALTH QUESTIONNAIRE - PHQ9: CLINICAL INTERPRETATION OF PHQ2 SCORE: 0

## 2024-05-30 ASSESSMENT — ENCOUNTER SYMPTOMS: GENERAL WELL-BEING: GOOD

## 2024-05-30 NOTE — PROGRESS NOTES
Chief Complaint   Patient presents with    Medicare Annual Wellness       HPI:  Ruth Shoemaker is a 68 y.o. here for Medicare Annual Wellness Visit     Patient Active Problem List    Diagnosis Date Noted    Bone pain 01/26/2024    History of smoking for 6-10 years 01/26/2024    Elevated alkaline phosphatase level 01/26/2024    Age-related osteoporosis without current pathological fracture 10/17/2023    Pain in joint of left shoulder 07/25/2023    Skin texture changes 07/25/2023    Pelvic pain 04/18/2023    Stress incontinence of urine 01/18/2023    Closed fracture of neck of right fibula 07/19/2022    Leg pain 07/12/2022    Rectal bleeding 11/17/2021    Numbness and tingling in right hand 11/17/2021    Atypical chest pain 11/09/2021    Acute pain of right shoulder 11/09/2021    Primary hypertension 11/09/2021    Second hand smoke exposure 08/26/2020    Well woman exam with routine gynecological exam 02/26/2020    Vaginal atrophy 08/15/2019    Mixed hyperlipidemia 11/14/2018    Dysuria 11/13/2017    Abnormal TSH 11/13/2017    Vitamin D deficiency 02/17/2017    Screening for cervical cancer 02/17/2017    Elevated ferritin 02/17/2017    Fatigue 11/15/2016    Epigastric pain 10/14/2016    Stress 10/14/2016    Gastroesophageal reflux disease without esophagitis 10/14/2016    Urinary frequency 10/14/2016       Current Outpatient Medications   Medication Sig Dispense Refill    simvastatin (ZOCOR) 20 MG Tab TAKE 1 TABLET BY MOUTH ONCE DAILY IN THE EVENING 90 Tablet 3    pantoprazole (PROTONIX) 40 MG Tablet Delayed Response Take 1 tablet by mouth once daily 90 Tablet 3    famotidine (PEPCID) 40 MG Tab Take 1 tablet by mouth twice daily 180 Tablet 3    losartan (COZAAR) 100 MG Tab Take 1 tablet by mouth once daily 90 Tablet 3    estradiol (ESTRACE) 0.1 MG/GM vaginal cream INSERT 1 GRAM VAGINALLY ONCE DAILY 43 g 3    amLODIPine (NORVASC) 5 MG Tab TAKE 1 TABLET BY MOUTH ONCE DAILY FOR HIGH BLOOD PRESSURE 90 Tablet 3     dicyclomine (BENTYL) 10 MG Cap TAKE 1 CAPSULE BY MOUTH 4 TIMES DAILY BEFORE MEAL(S) AND AT BEDTIME 360 Capsule 3    solifenacin (VESICARE) 10 MG tablet Take 1 Tablet by mouth every day. Brand name only--please dispense brand name as pt does not tolerate the generic version of medication, please dispense 90 Tablet 3    vitamin D (CHOLECALCIFEROL) 1000 UNIT Tab Take 1,000 Units by mouth every day.       No current facility-administered medications for this visit.          Current supplements as per medication list.     Allergies: Codeine    Current social contact/activities: yes       She  reports that she has quit smoking. Her smoking use included cigarettes. She has never used smokeless tobacco. She reports that she does not drink alcohol and does not use drugs.  Counseling given: Not Answered  Tobacco comments: quite 3 years ago      ROS:    Gait: Uses no assistive device  Ostomy: No  Other tubes: No  Amputations: No  Chronic oxygen use: No  Last eye exam: 2020  Wears hearing aids: No   : Reports urinary leakage during the last 6 months that has not interfered at all with their daily activities or sleep.    Screening:    Depression Screening  Little interest or pleasure in doing things?  0 - not at all  Feeling down, depressed , or hopeless? 0 - not at all  Patient Health Questionnaire Score: 0     If depressive symptoms identified deferred to follow up visit unless specifically addressed in assessment and plan.    Interpretation of PHQ-9 Total Score   Score Severity   1-4 No Depression   5-9 Mild Depression   10-14 Moderate Depression   15-19 Moderately Severe Depression   20-27 Severe Depression    Screening for Cognitive Impairment  Do you or any of your friends or family members have any concern about your memory? No  Three Minute Recall (Leader, Season, Table) 3/3    Jovanny clock face with all 12 numbers and set the hands to show 10 minutes after 11.       Cognitive concerns identified deferred for follow up  unless specifically addressed in assessment and plan.    Fall Risk Assessment  Has the patient had two or more falls in the last year or any fall with injury in the last year?  No    Safety Assessment  Do you always wear your seatbelt?  Yes  Any changes to home needed to function safely? No  Difficulty hearing.  Yes  Patient counseled about all safety risks that were identified.    Functional Assessment ADLs  Are there any barriers preventing you from cooking for yourself or meeting nutritional needs?  No.    Are there any barriers preventing you from driving safely or obtaining transportation?  No.    Are there any barriers preventing you from using a telephone or calling for help?  No    Are there any barriers preventing you from shopping?  No.    Are there any barriers preventing you from taking care of your own finances?  No    Are there any barriers preventing you from managing your medications?  No    Are there any barriers preventing you from showering, bathing or dressing yourself? No    Are there any barriers preventing you from doing housework or laundry? No  Are there any barriers preventing you from using the toilet?No  Are you currently engaging in any exercise or physical activity?  Yes.      Self-Assessment of Health  What is your perception of your health? Good  Do you sleep more than six hours a night? Yes  In the past 7 days, how much did pain keep you from doing your normal work? None  Do you spend quality time with family or friends (virtually or in person)? Yes  Do you usually eat a heart healthy diet that constists of a variety of fruits, vegetables, whole grains and fiber? Yes  Do you eat foods high in fat and/or Fast Food more than three times per week? No    Advance Care Planning  Do you have an Advance Directive, Living Will, Durable Power of , or POLST? No                 Health Maintenance Summary            Ordered - Bone Density Scan (Every 5 Years) Ordered on 9/14/2023      No  completion history exists for this topic.              Overdue - Annual Wellness Visit (Yearly) Never done      No completion history exists for this topic.              Mammogram (Yearly) Next due on 1/10/2025      01/10/2024  Done    12/13/2022  Done    12/07/2021  MA-SCREENING MAMMO BILAT W/TOMOSYNTHESIS W/CAD    04/27/2017  MA-SCREEN MAMMO W/CAD-BILAT              Colorectal Cancer Screening (Colonoscopy - Every 10 Years) Tentatively due on 10/31/2026      10/31/2016  REFERRAL TO GI FOR COLONOSCOPY              IMM DTaP/Tdap/Td Vaccine (2 - Td or Tdap) Next due on 11/25/2029 11/25/2019  Imm Admin: Tdap Vaccine              Hepatitis C Screening  Tentatively Complete      02/19/2020  Hepatitis C Antibody component of HEP C VIRUS ANTIBODY              Zoster (Shingles) Vaccines (Series Information) Completed      08/03/2020  Imm Admin: Zoster Vaccine Recombinant (RZV) (SHINGRIX)    07/20/2020  Imm Admin: Zoster Vaccine Recombinant (RZV) (SHINGRIX)    12/30/2019  Imm Admin: Zoster Vaccine Recombinant (RZV) (SHINGRIX)    11/25/2019  Imm Admin: Zoster Vaccine Recombinant (RZV) (SHINGRIX)              Pneumococcal Vaccine: 65+ Years (Series Information) Completed      01/18/2023  Imm Admin: Pneumococcal polysaccharide vaccine (PPSV-23)    12/29/2021  Imm Admin: Pneumococcal Conjugate Vaccine (Prevnar/PCV-13)              Influenza Vaccine (Series Information) Completed      10/17/2023  Imm Admin: Influenza Vaccine Adult HD    11/03/2022  Imm Admin: Influenza Vaccine Adult HD    11/09/2021  Imm Admin: Influenza Vaccine Adult HD    10/22/2020  Imm Admin: Influenza Vaccine Quad Inj (Pf)    11/25/2019  Imm Admin: Influenza Vaccine Quad Inj (Pf)    Only the first 5 history entries have been loaded, but more history exists.              COVID-19 Vaccine (Series Information) Completed      12/27/2023  Imm Admin: Covid-19 Mrna (Spikevax) Moderna 12+ Years    11/03/2022  Outside Immunization: COVID mRNA Bivalent(MOD)     07/06/2022  Imm Admin: MODERNA SARS-COV-2 VACCINE (12+)    01/19/2022  Imm Admin: MODERNA SARS-COV-2 VACCINE (12+)    04/29/2021  Imm Admin: MODERNA SARS-COV-2 VACCINE (12+)    Only the first 5 history entries have been loaded, but more history exists.              Hepatitis A Vaccine (Hep A) (Series Information) Aged Out      No completion history exists for this topic.              Hepatitis B Vaccine (Hep B) (Series Information) Aged Out      No completion history exists for this topic.              HPV Vaccines (Series Information) Aged Out      No completion history exists for this topic.              Polio Vaccine (Inactivated Polio) (Series Information) Aged Out      No completion history exists for this topic.              Meningococcal Immunization (Series Information) Aged Out      No completion history exists for this topic.              Discontinued - Cervical Cancer Screening  Discontinued        Frequency changed to Never automatically (Topic No Longer Applies)    02/26/2020  Thinprep Pap with HPV    02/26/2020  Pathology Gynecology Specimen    02/18/2020  Done - normal    02/17/2017  THINPREP PAP WITH HPV    Only the first 5 history entries have been loaded, but more history exists.                    Patient Care Team:  Iva Hopkins M.D. as PCP - General (Family Medicine)        Social History     Tobacco Use    Smoking status: Former     Types: Cigarettes    Smokeless tobacco: Never    Tobacco comments:     quite 3 years ago   Vaping Use    Vaping status: Never Used   Substance Use Topics    Alcohol use: No     Alcohol/week: 0.0 oz    Drug use: No     Family History   Problem Relation Age of Onset    Hypertension Mother     Diabetes Father     Heart Attack Father     Stroke Father      She  has a past medical history of GERD (gastroesophageal reflux disease), Hyperlipidemia, and Hypertension.   Past Surgical History:   Procedure Laterality Date    OTHER      surgery for deviated septum  "      Exam:   /80   Pulse 80   Temp 36.8 °C (98.2 °F) (Temporal)   Resp 16   Ht 1.6 m (5' 3\")   Wt 69.9 kg (154 lb)   SpO2 96%  Body mass index is 27.28 kg/m².    Hearing fair.    Dentition fair  Alert, oriented in no acute distress.  Eye contact is good, speech goal directed, affect calm    Assessment and Plan. The following treatment and monitoring plan is recommended:    There are no diagnoses linked to this encounter.    Services suggested: No services needed at this time  Health Care Screening: Age-appropriate preventive services recommended by USPTF and ACIP covered by Medicare were discussed today. Services ordered if indicated and agreed upon by the patient.  Referrals offered: Community-based lifestyle interventions to reduce health risks and promote self-management and wellness, fall prevention, nutrition, physical activity, tobacco-use cessation, weight loss, and mental health services as per orders if indicated.    Discussion today about general wellness and lifestyle habits:    Prevent falls and reduce trip hazards; Cautioned about securing or removing rugs.  Have a working fire alarm and carbon monoxide detector;   Engage in regular physical activity and social activities     Follow-up: No follow-ups on file.    "

## 2024-06-03 DIAGNOSIS — K21.9 GASTROESOPHAGEAL REFLUX DISEASE WITHOUT ESOPHAGITIS: ICD-10-CM

## 2024-06-03 DIAGNOSIS — R10.13 EPIGASTRIC PAIN: ICD-10-CM

## 2024-06-04 RX ORDER — DICYCLOMINE HYDROCHLORIDE 10 MG/1
CAPSULE ORAL
Qty: 360 CAPSULE | Refills: 3 | Status: SHIPPED | OUTPATIENT
Start: 2024-06-04

## 2024-06-04 NOTE — TELEPHONE ENCOUNTER
Requested Prescriptions     Pending Prescriptions Disp Refills    dicyclomine (BENTYL) 10 MG Cap [Pharmacy Med Name: Dicyclomine HCl 10 MG Oral Capsule] 360 Capsule 0     Sig: TAKE 1 CAPSULE BY MOUTH 4 TIMES DAILY BEFORE MEAL(S) AND AT BEDTIME      Last office visit: 5/30/24  Last lab:  1/26/24

## 2024-06-05 PROBLEM — Z12.11 COLON CANCER SCREENING: Status: ACTIVE | Noted: 2024-06-05

## 2024-06-05 PROBLEM — R79.89 ABNORMAL TSH: Status: RESOLVED | Noted: 2017-11-13 | Resolved: 2024-06-05

## 2024-06-05 PROBLEM — R30.0 DYSURIA: Status: RESOLVED | Noted: 2017-11-13 | Resolved: 2024-06-05

## 2024-06-05 PROBLEM — R23.4 SKIN TEXTURE CHANGES: Status: RESOLVED | Noted: 2023-07-25 | Resolved: 2024-06-05

## 2024-06-05 PROBLEM — M89.8X9 BONE PAIN: Status: RESOLVED | Noted: 2024-01-26 | Resolved: 2024-06-05

## 2024-06-05 PROBLEM — S82.831A: Status: RESOLVED | Noted: 2022-07-19 | Resolved: 2024-06-05

## 2024-06-05 PROBLEM — R07.89 ATYPICAL CHEST PAIN: Status: RESOLVED | Noted: 2021-11-09 | Resolved: 2024-06-05

## 2024-06-05 PROBLEM — M25.511 ACUTE PAIN OF RIGHT SHOULDER: Status: RESOLVED | Noted: 2021-11-09 | Resolved: 2024-06-05

## 2024-06-05 PROBLEM — K62.5 RECTAL BLEEDING: Status: RESOLVED | Noted: 2021-11-17 | Resolved: 2024-06-05

## 2024-06-05 NOTE — ASSESSMENT & PLAN NOTE
Normal lfts other than alk phos, normal iso enzymes for bone and liver.   Continue vit d, alendronate for osteoporosis

## 2024-06-05 NOTE — ASSESSMENT & PLAN NOTE
Overactive bladder and stress incontinence 90% improved with kegels.   Encouraged to empty her bladder regularly.   Also on vesicare 10 mg daily

## 2024-08-19 ENCOUNTER — HOSPITAL ENCOUNTER (OUTPATIENT)
Dept: LAB | Facility: MEDICAL CENTER | Age: 69
End: 2024-08-19
Attending: FAMILY MEDICINE
Payer: MEDICARE

## 2024-08-19 DIAGNOSIS — E78.2 MIXED HYPERLIPIDEMIA: ICD-10-CM

## 2024-08-19 DIAGNOSIS — R73.01 ELEVATED FASTING GLUCOSE: ICD-10-CM

## 2024-08-19 LAB
ALBUMIN SERPL BCP-MCNC: 4.7 G/DL (ref 3.2–4.9)
ALBUMIN/GLOB SERPL: 1.6 G/DL
ALP SERPL-CCNC: 93 U/L (ref 30–99)
ALT SERPL-CCNC: 48 U/L (ref 2–50)
ANION GAP SERPL CALC-SCNC: 12 MMOL/L (ref 7–16)
AST SERPL-CCNC: 33 U/L (ref 12–45)
BILIRUB SERPL-MCNC: 0.6 MG/DL (ref 0.1–1.5)
BUN SERPL-MCNC: 15 MG/DL (ref 8–22)
CALCIUM ALBUM COR SERPL-MCNC: 9.7 MG/DL (ref 8.5–10.5)
CALCIUM SERPL-MCNC: 10.3 MG/DL (ref 8.5–10.5)
CHLORIDE SERPL-SCNC: 102 MMOL/L (ref 96–112)
CHOLEST SERPL-MCNC: 155 MG/DL (ref 100–199)
CO2 SERPL-SCNC: 25 MMOL/L (ref 20–33)
CREAT SERPL-MCNC: 0.87 MG/DL (ref 0.5–1.4)
EST. AVERAGE GLUCOSE BLD GHB EST-MCNC: 111 MG/DL
FASTING STATUS PATIENT QL REPORTED: NORMAL
GFR SERPLBLD CREATININE-BSD FMLA CKD-EPI: 73 ML/MIN/1.73 M 2
GLOBULIN SER CALC-MCNC: 3 G/DL (ref 1.9–3.5)
GLUCOSE SERPL-MCNC: 102 MG/DL (ref 65–99)
HBA1C MFR BLD: 5.5 % (ref 4–5.6)
HDLC SERPL-MCNC: 53 MG/DL
LDLC SERPL CALC-MCNC: 70 MG/DL
POTASSIUM SERPL-SCNC: 4.8 MMOL/L (ref 3.6–5.5)
PROT SERPL-MCNC: 7.7 G/DL (ref 6–8.2)
SODIUM SERPL-SCNC: 139 MMOL/L (ref 135–145)
TRIGL SERPL-MCNC: 159 MG/DL (ref 0–149)

## 2024-08-19 PROCEDURE — 80053 COMPREHEN METABOLIC PANEL: CPT

## 2024-08-19 PROCEDURE — 80061 LIPID PANEL: CPT

## 2024-08-19 PROCEDURE — 36415 COLL VENOUS BLD VENIPUNCTURE: CPT

## 2024-08-19 PROCEDURE — 83036 HEMOGLOBIN GLYCOSYLATED A1C: CPT | Mod: GA

## 2024-09-05 ENCOUNTER — HOSPITAL ENCOUNTER (OUTPATIENT)
Dept: LAB | Facility: MEDICAL CENTER | Age: 69
End: 2024-09-05
Attending: FAMILY MEDICINE
Payer: MEDICARE

## 2024-09-05 ENCOUNTER — APPOINTMENT (OUTPATIENT)
Dept: MEDICAL GROUP | Facility: PHYSICIAN GROUP | Age: 69
End: 2024-09-05
Payer: MEDICARE

## 2024-09-05 ENCOUNTER — NON-PROVIDER VISIT (OUTPATIENT)
Dept: URGENT CARE | Facility: PHYSICIAN GROUP | Age: 69
End: 2024-09-05
Payer: MEDICARE

## 2024-09-05 ENCOUNTER — APPOINTMENT (OUTPATIENT)
Dept: RADIOLOGY | Facility: IMAGING CENTER | Age: 69
End: 2024-09-05
Attending: FAMILY MEDICINE
Payer: MEDICARE

## 2024-09-05 VITALS
RESPIRATION RATE: 16 BRPM | WEIGHT: 156 LBS | HEIGHT: 63 IN | SYSTOLIC BLOOD PRESSURE: 118 MMHG | BODY MASS INDEX: 27.64 KG/M2 | OXYGEN SATURATION: 95 % | DIASTOLIC BLOOD PRESSURE: 60 MMHG | HEART RATE: 95 BPM | TEMPERATURE: 96.9 F

## 2024-09-05 DIAGNOSIS — N93.9 VAGINA BLEEDING: ICD-10-CM

## 2024-09-05 DIAGNOSIS — N95.0 POST-MENOPAUSAL BLEEDING: ICD-10-CM

## 2024-09-05 DIAGNOSIS — M25.512 PAIN IN JOINT OF LEFT SHOULDER: ICD-10-CM

## 2024-09-05 DIAGNOSIS — M25.512 ACUTE PAIN OF LEFT SHOULDER: ICD-10-CM

## 2024-09-05 DIAGNOSIS — M25.561 CHRONIC PAIN OF RIGHT KNEE: ICD-10-CM

## 2024-09-05 DIAGNOSIS — D64.9 ANEMIA, UNSPECIFIED TYPE: ICD-10-CM

## 2024-09-05 DIAGNOSIS — G89.29 CHRONIC PAIN OF RIGHT KNEE: ICD-10-CM

## 2024-09-05 DIAGNOSIS — R93.89 THICKENED ENDOMETRIUM: ICD-10-CM

## 2024-09-05 LAB
IRON SATN MFR SERPL: 25 % (ref 15–55)
IRON SERPL-MCNC: 78 UG/DL (ref 40–170)
TIBC SERPL-MCNC: 311 UG/DL (ref 250–450)
UIBC SERPL-MCNC: 233 UG/DL (ref 110–370)

## 2024-09-05 PROCEDURE — 36415 COLL VENOUS BLD VENIPUNCTURE: CPT

## 2024-09-05 PROCEDURE — 3074F SYST BP LT 130 MM HG: CPT | Performed by: FAMILY MEDICINE

## 2024-09-05 PROCEDURE — 83550 IRON BINDING TEST: CPT

## 2024-09-05 PROCEDURE — 3078F DIAST BP <80 MM HG: CPT | Performed by: FAMILY MEDICINE

## 2024-09-05 PROCEDURE — 73560 X-RAY EXAM OF KNEE 1 OR 2: CPT | Mod: TC,FY,RT | Performed by: NURSE PRACTITIONER

## 2024-09-05 PROCEDURE — 99214 OFFICE O/P EST MOD 30 MIN: CPT | Performed by: FAMILY MEDICINE

## 2024-09-05 PROCEDURE — 83540 ASSAY OF IRON: CPT

## 2024-09-05 ASSESSMENT — FIBROSIS 4 INDEX: FIB4 SCORE: 1.29

## 2024-09-05 NOTE — ASSESSMENT & PLAN NOTE
May and June 2024 had bright red bleeding large amount to soak pads  Prior to this had dark bleeding every two months  Plevic US showed thickened endometrial thickening.   Stopped her vaginal estrogen, doing ok with stopping the vaginal estrogen has some hot flashes but not severe like before. Was on vaginal estrogen supplement about 5 yrs.   Had hx of endometrial biopsy in the past which was benign, this was very painful for her  New ref to obgyn to evaluate thickened endometrium, if biopsy needed, with pain medication or sedation.

## 2024-09-05 NOTE — PROGRESS NOTES
Subjective:   Ruth Shoemaker is a 68 y.o. female here today for evaluation and management of:     Pain in joint of left shoulder  Sharp pain in left shoulder  Pain with weakness  Xray normal  Not improved with tylenol/ibuprofen  ROM able to lift arm to ear but with pain  Not improved with prescribed exercises  MRI ordered to check for rotator cuff tear.     Vagina bleeding  May and June 2024 had bright red bleeding large amount to soak pads  Prior to this had dark bleeding every two months  Plevic US showed thickened endometrial thickening.   Stopped her vaginal estrogen, doing ok with stopping the vaginal estrogen has some hot flashes but not severe like before. Was on vaginal estrogen supplement about 5 yrs.   Had hx of endometrial biopsy in the past which was benign, this was very painful for her  New ref to obgyn to evaluate thickened endometrium, if biopsy needed, with pain medication or sedation.      Right knee pain  Check xray  Ice   Only when she is on the floor when she gets up it is stiff and painful  Ref to PT    [place]   No shortness of breath  On losartan      Current medicines (including changes today)  Current Outpatient Medications   Medication Sig Dispense Refill    dicyclomine (BENTYL) 10 MG Cap TAKE 1 CAPSULE BY MOUTH 4 TIMES DAILY BEFORE MEAL(S) AND AT BEDTIME 360 Capsule 3    estradiol (ESTRACE) 0.1 MG/GM vaginal cream INSERT 1 GRAM VAGINALLY ONCE DAILY 86 g 3    alendronate (FOSAMAX) 70 MG Tab Take 1 Tablet by mouth every 7 days. 12 Tablet 3    simvastatin (ZOCOR) 20 MG Tab TAKE 1 TABLET BY MOUTH ONCE DAILY IN THE EVENING 90 Tablet 3    pantoprazole (PROTONIX) 40 MG Tablet Delayed Response Take 1 tablet by mouth once daily 90 Tablet 3    famotidine (PEPCID) 40 MG Tab Take 1 tablet by mouth twice daily 180 Tablet 3    losartan (COZAAR) 100 MG Tab Take 1 tablet by mouth once daily 90 Tablet 3    amLODIPine (NORVASC) 5 MG Tab TAKE 1 TABLET BY MOUTH ONCE DAILY FOR HIGH BLOOD  "PRESSURE 90 Tablet 3    solifenacin (VESICARE) 10 MG tablet Take 1 Tablet by mouth every day. Brand name only--please dispense brand name as pt does not tolerate the generic version of medication, please dispense 90 Tablet 3    vitamin D (CHOLECALCIFEROL) 1000 UNIT Tab Take 1,000 Units by mouth every day.       No current facility-administered medications for this visit.     She  has a past medical history of GERD (gastroesophageal reflux disease), Hyperlipidemia, and Hypertension.    ROS  No chest pain, no shortness of breath, no abdominal pain       Objective:     /60 (BP Location: Left arm, Patient Position: Sitting, BP Cuff Size: Adult)   Pulse 95   Temp 36.1 °C (96.9 °F) (Temporal)   Resp 16   Ht 1.6 m (5' 3\")   Wt 70.8 kg (156 lb)   SpO2 95%  Body mass index is 27.63 kg/m².   Physical Exam:  Constitutional: Alert, no distress.  Skin: Warm, dry, good turgor, no rashes in visible areas.  Eye: Equal, round and reactive, conjunctiva clear, lids normal.  ENMT: Lips without lesions, good dentition, oropharynx clear.  Neck: Trachea midline, no masses, no thyromegaly. No cervical or supraclavicular lymphadenopathy  Respiratory: Unlabored respiratory effort, lungs clear to auscultation, no wheezes, no ronchi.  Cardiovascular: Normal S1, S2, no murmur, no edema.  Abdomen: Soft, non-tender, no masses, no hepatosplenomegaly.  Psych: Alert and oriented x3, normal affect and mood.    Assessment and Plan:   The following treatment plan was discussed    1. Post-menopausal bleeding  - Referral to Gynecology    2. Thickened endometrium  - Referral to Gynecology    3. Acute pain of left shoulder  - MR-SHOULDER-W/O LEFT; Future  - Referral to Physical Therapy    4. Pain in joint of left shoulder  - Referral to Physical Therapy    5. Vagina bleeding    6. Chronic pain of right knee  - DX-KNEE 2- RIGHT; Future  - Referral to Physical Therapy    7. Anemia, unspecified type  - IRON/TOTAL IRON BIND; Future      Followup: " Return in about 3 months (around 12/5/2024) for pls fax MRI, xray to [facility].

## 2024-09-05 NOTE — ASSESSMENT & PLAN NOTE
Sharp pain in left shoulder  Pain with weakness  Xray normal  Not improved with tylenol/ibuprofen  ROM able to lift arm to ear but with pain  Not improved with prescribed exercises  MRI ordered to check for rotator cuff tear.

## 2024-10-08 RX ORDER — AMLODIPINE BESYLATE 5 MG/1
TABLET ORAL
Qty: 90 TABLET | Refills: 3 | Status: SHIPPED | OUTPATIENT
Start: 2024-10-08

## 2024-12-01 NOTE — PROGRESS NOTES
Gynecology Visit        CC: postmenopausal bleeding      HPI  Ruth Shoemaker is a 69 y.o. female  presenting today for the above.  Patient with 3 episodes of postmenopausal bleeding, last  was in 2024.  She has been menopausal for over 10 years, never had postmenopausal bleeding.  She was on estradiol cream until September, she then discontinued with the last bleeding episode and has not had any bleeding since.      Imaging  Pelvic U 2024: 9.9mm endometrial lining     Menstrual History  No LMP recorded. Patient is postmenopausal.  Menopausal     Gynecologic History  Last pap:  NILM/HPV neg  Hx abnormal pap smears: yes      OB History    Para Term  AB Living   3 2 2   1 2   SAB IAB Ectopic Molar Multiple Live Births   1         2      # Outcome Date GA Lbr Shawn/2nd Weight Sex Type Anes PTL Lv   3 Term 97 40w0d   M Vag-Spont   CHARMAINE   2 Term 10/15/76 40w0d   F Vag-Spont   CHARMAINE   1 SAB                Past Medical History  Past Medical History:   Diagnosis Date    GERD (gastroesophageal reflux disease)     Hyperlipidemia     Hypertension        Past Surgical History  Past Surgical History:   Procedure Laterality Date    OTHER      surgery for deviated septum       Social History  Social History     Tobacco Use    Smoking status: Former     Types: Cigarettes    Smokeless tobacco: Never    Tobacco comments:     quite 3 years ago   Vaping Use    Vaping status: Never Used   Substance Use Topics    Alcohol use: No     Alcohol/week: 0.0 oz    Drug use: No        Family History  Family History   Problem Relation Age of Onset    Hypertension Mother     Diabetes Father     Heart Attack Father     Stroke Father        Home Medications  Current Outpatient Medications   Medication Sig    amLODIPine (NORVASC) 5 MG Tab TAKE 1 TABLET BY MOUTH ONCE DAILY FOR HIGH BLOOD PRESSURE    dicyclomine (BENTYL) 10 MG Cap TAKE 1 CAPSULE BY MOUTH 4 TIMES DAILY BEFORE MEAL(S) AND AT BEDTIME     simvastatin (ZOCOR) 20 MG Tab TAKE 1 TABLET BY MOUTH ONCE DAILY IN THE EVENING    pantoprazole (PROTONIX) 40 MG Tablet Delayed Response Take 1 tablet by mouth once daily    famotidine (PEPCID) 40 MG Tab Take 1 tablet by mouth twice daily    losartan (COZAAR) 100 MG Tab Take 1 tablet by mouth once daily    solifenacin (VESICARE) 10 MG tablet Take 1 Tablet by mouth every day. Brand name only--please dispense brand name as pt does not tolerate the generic version of medication, please dispense    vitamin D (CHOLECALCIFEROL) 1000 UNIT Tab Take 1,000 Units by mouth every day.    estradiol (ESTRACE) 0.1 MG/GM vaginal cream INSERT 1 GRAM VAGINALLY ONCE DAILY (Patient not taking: Reported on 12/2/2024)    alendronate (FOSAMAX) 70 MG Tab Take 1 Tablet by mouth every 7 days. (Patient not taking: Reported on 12/2/2024)       Allergies/Reactions  Allergies   Allergen Reactions    Codeine Nausea          ROS: I have reviewed all systems with patient. Pertinent positive and negative findings are listed below except for what is otherwise stated in the History of Present Illness.       Objective:    Labs  Lab Results   Component Value Date/Time    WBC 9.3 07/25/2023 02:33 PM    RBC 4.62 07/25/2023 02:33 PM    HEMOGLOBIN 13.5 07/25/2023 02:33 PM    HEMATOCRIT 42.4 07/25/2023 02:33 PM    MCV 91.8 07/25/2023 02:33 PM    MCH 29.2 07/25/2023 02:33 PM    MCHC 31.8 (L) 07/25/2023 02:33 PM    RDW 43.4 07/25/2023 02:33 PM    PLATELETCT 247 07/25/2023 02:33 PM    MPV 13.2 (H) 07/25/2023 02:33 PM    NEUTSPOLYS 70.90 07/25/2023 02:33 PM    LYMPHOCYTES 17.30 (L) 07/25/2023 02:33 PM    MONOCYTES 9.10 07/25/2023 02:33 PM    EOSINOPHILS 1.70 07/25/2023 02:33 PM    BASOPHILS 0.50 07/25/2023 02:33 PM    IMMGRAN 0.50 07/25/2023 02:33 PM    NRBC 0.00 07/25/2023 02:33 PM    NEUTS 6.59 07/25/2023 02:33 PM    LYMPHS 1.61 07/25/2023 02:33 PM    MONOS 0.85 07/25/2023 02:33 PM    EOS 0.16 07/25/2023 02:33 PM    BASO 0.05 07/25/2023 02:33 PM    IMMGRANAB  "0.05 07/25/2023 02:33 PM    NRBCAB 0.00 07/25/2023 02:33 PM       Lab Results   Component Value Date/Time    HBA1C 5.5 08/19/2024 01:41 PM         Physical Exam  BP (!) 151/80 (BP Location: Left arm, Patient Position: Sitting, BP Cuff Size: Adult)   Pulse 89   Ht 5' 3\"   Wt 159 lb   BMI 28.17 kg/m²    No LMP recorded. Patient is postmenopausal.  Body mass index is 28.17 kg/m².    General: alert, well appearing, and in no distress  Neurological: alert, oriented, normal speech, no focal findings or movement disorder noted  Respiratory: no tachypnea, retractions or cyanosis    Pelvic exam:   external genitalia: normal appearance, erythema around anus and vaginal introitus, cleaning of the tissue suspicious for lichen sclerosis  urinary system: urethral meatus normal  vaginal: Atrophic  cervix: normal appearance, external os severely stenotic      Female chaperone present.         Assessment:  69-year-old postmenopausal  3 episodes of postmenopausal bleeding  Endometrial lining 4 mm  Previously on estradiol cream, discontinued since bleeding episodes.  No bleeding since September         Plan:  1. Postmenopausal bleeding  Severely stenotic external os, unable to proceed with endometrial biopsy.  Recommend hysteroscopy and D&C.  Patient reluctant to go under anesthesia.  Alternatively, could have repeat ultrasound and if lining is less than 4 mm and no more episodes of bleeding, we could monitor.  Patient has decided to proceed with repeat pelvic ultrasound.  She is aware that if another bleeding episode happens or if lining of the uterus is greater than 4 mm, she will need hysteroscopy and D&C.      - US-PELVIC COMPLETE (TRANSABDOMINAL/TRANSVAGINAL) (COMBO); Future         Tonya Nolan MD  "

## 2024-12-02 ENCOUNTER — APPOINTMENT (OUTPATIENT)
Dept: GYNECOLOGY | Facility: CLINIC | Age: 69
End: 2024-12-02
Payer: MEDICARE

## 2024-12-02 VITALS
HEIGHT: 63 IN | BODY MASS INDEX: 28.17 KG/M2 | SYSTOLIC BLOOD PRESSURE: 151 MMHG | WEIGHT: 159 LBS | DIASTOLIC BLOOD PRESSURE: 80 MMHG | HEART RATE: 89 BPM

## 2024-12-02 DIAGNOSIS — N95.0 POSTMENOPAUSAL BLEEDING: ICD-10-CM

## 2024-12-02 PROCEDURE — 99204 OFFICE O/P NEW MOD 45 MIN: CPT | Performed by: STUDENT IN AN ORGANIZED HEALTH CARE EDUCATION/TRAINING PROGRAM

## 2024-12-02 ASSESSMENT — FIBROSIS 4 INDEX: FIB4 SCORE: 1.31

## 2024-12-02 NOTE — PROGRESS NOTES
Pt here for postmenopausal bleeding + establish care  Pt states:   *U/S 6/20/24 -endometrial stripe 9.9mm (thickened)    [facility] PT.   Intake:   Last PAP: 2/26/20  Hx of STDs: no  Mammo: 3/31/22, 2024  Colonoscopy: 2016 - polyp in rectum, diverticulosis of sigmoid colon  DEXA: 2024

## 2024-12-12 ENCOUNTER — OFFICE VISIT (OUTPATIENT)
Dept: MEDICAL GROUP | Facility: PHYSICIAN GROUP | Age: 69
End: 2024-12-12
Payer: MEDICARE

## 2024-12-12 VITALS
SYSTOLIC BLOOD PRESSURE: 110 MMHG | OXYGEN SATURATION: 95 % | TEMPERATURE: 97 F | DIASTOLIC BLOOD PRESSURE: 80 MMHG | HEART RATE: 89 BPM | RESPIRATION RATE: 16 BRPM | HEIGHT: 63 IN | WEIGHT: 157 LBS | BODY MASS INDEX: 27.82 KG/M2

## 2024-12-12 DIAGNOSIS — Z12.31 ENCOUNTER FOR SCREENING MAMMOGRAM FOR MALIGNANT NEOPLASM OF BREAST: ICD-10-CM

## 2024-12-12 DIAGNOSIS — M67.912 TENDINOPATHY OF LEFT SHOULDER: ICD-10-CM

## 2024-12-12 DIAGNOSIS — E78.2 MIXED HYPERLIPIDEMIA: ICD-10-CM

## 2024-12-12 DIAGNOSIS — R74.8 ELEVATED ALKALINE PHOSPHATASE LEVEL: ICD-10-CM

## 2024-12-12 DIAGNOSIS — Z23 NEED FOR VACCINATION: ICD-10-CM

## 2024-12-12 DIAGNOSIS — E55.9 VITAMIN D DEFICIENCY: ICD-10-CM

## 2024-12-12 DIAGNOSIS — R73.01 ELEVATED FASTING GLUCOSE: ICD-10-CM

## 2024-12-12 DIAGNOSIS — N93.9 VAGINA BLEEDING: ICD-10-CM

## 2024-12-12 DIAGNOSIS — M75.112 NONTRAUMATIC INCOMPLETE TEAR OF LEFT ROTATOR CUFF: ICD-10-CM

## 2024-12-12 DIAGNOSIS — M25.512 ACUTE PAIN OF LEFT SHOULDER: ICD-10-CM

## 2024-12-12 PROCEDURE — G0008 ADMIN INFLUENZA VIRUS VAC: HCPCS

## 2024-12-12 PROCEDURE — 90662 IIV NO PRSV INCREASED AG IM: CPT

## 2024-12-12 PROCEDURE — 3079F DIAST BP 80-89 MM HG: CPT | Performed by: FAMILY MEDICINE

## 2024-12-12 PROCEDURE — 99214 OFFICE O/P EST MOD 30 MIN: CPT | Mod: 25 | Performed by: FAMILY MEDICINE

## 2024-12-12 PROCEDURE — 3074F SYST BP LT 130 MM HG: CPT | Performed by: FAMILY MEDICINE

## 2024-12-12 ASSESSMENT — FIBROSIS 4 INDEX: FIB4 SCORE: 1.31

## 2024-12-13 NOTE — PROGRESS NOTES
Subjective:   Ruth Shoemaker is a 69 y.o. female here today for evaluation and management of:     History of Present Illness  The patient presents for evaluation of left shoulder tendinopathy, vaginal bleeding, urinary incontinence, eye floaters, and health maintenance.    She has been experiencing severe pain in her left shoulder, which has prevented her from attending physical therapy. The pain is so intense that it disrupts her sleep and daily activities, such as lifting objects. She also reports intermittent pain upon palpation of the shoulder. She has been managing the pain with daily baby aspirin and Aleve as needed.    She has discontinued her alendronate medication due to associated side effects and is seeking advice on whether to resume it.    She reports experiencing urinary incontinence, which she had previously managed with Kegel exercises. However, the condition has gradually worsened. She also reports experiencing hot flashes.    She describes a sudden onset of thirst that is unquenchable, leading to increased water intake and frequent urination. This symptom has been present for the past month. She also reports a decrease in energy levels around 6:00 PM for the past 2 weeks. She does not report any preceding symptoms such as sore throat, fevers, chills, diarrhea, or vomiting. She does not report any symptoms of sleep apnea, such as snoring or cessation of breathing during sleep. She is currently on Vesicare.    She reports the presence of a floater in her vision, described as a string-like object. She does not report any double vision, vision loss, bright flashes of light, pain with eye movements, or a shadow or curtain over her vision.    She is scheduled for a mammogram in January 2025.    Supplemental Information  She had an episode of diarrhea after consuming salmon but has since recovered. She was previously prescribed thyroid medication by a holistic doctor, but her thyroid levels have  "consistently been within normal limits. She has a history of working in an environment with exposure to toxic fumes, which resulted in elevated toxin levels.    FAMILY HISTORY  Her father had diabetes.    MEDICATIONS  Current: Baby aspirin, Aleve, Vesicare  Discontinued: Alendronate    IMMUNIZATIONS  She has had her shingles shots.          Current medicines (including changes today)  Current Outpatient Medications   Medication Sig Dispense Refill    amLODIPine (NORVASC) 5 MG Tab TAKE 1 TABLET BY MOUTH ONCE DAILY FOR HIGH BLOOD PRESSURE 90 Tablet 3    dicyclomine (BENTYL) 10 MG Cap TAKE 1 CAPSULE BY MOUTH 4 TIMES DAILY BEFORE MEAL(S) AND AT BEDTIME 360 Capsule 3    simvastatin (ZOCOR) 20 MG Tab TAKE 1 TABLET BY MOUTH ONCE DAILY IN THE EVENING 90 Tablet 3    pantoprazole (PROTONIX) 40 MG Tablet Delayed Response Take 1 tablet by mouth once daily 90 Tablet 3    famotidine (PEPCID) 40 MG Tab Take 1 tablet by mouth twice daily 180 Tablet 3    losartan (COZAAR) 100 MG Tab Take 1 tablet by mouth once daily 90 Tablet 3    solifenacin (VESICARE) 10 MG tablet Take 1 Tablet by mouth every day. Brand name only--please dispense brand name as pt does not tolerate the generic version of medication, please dispense 90 Tablet 3    vitamin D (CHOLECALCIFEROL) 1000 UNIT Tab Take 1,000 Units by mouth every day.      alendronate (FOSAMAX) 70 MG Tab Take 1 Tablet by mouth every 7 days. (Patient not taking: Reported on 12/12/2024) 12 Tablet 3     No current facility-administered medications for this visit.     She  has a past medical history of GERD (gastroesophageal reflux disease), Hyperlipidemia, and Hypertension.    ROS  No chest pain, no shortness of breath, no abdominal pain       Objective:     /80 (BP Location: Left arm, Patient Position: Sitting, BP Cuff Size: Adult)   Pulse 89   Temp 36.1 °C (97 °F) (Temporal)   Resp 16   Ht 1.6 m (5' 3\")   Wt 71.2 kg (157 lb)   SpO2 95%  Body mass index is 27.81 kg/m².   Physical " Exam:  Constitutional: Alert, no distress.  Skin: Warm, dry, good turgor, no rashes in visible areas.  Eye: Equal, round and reactive, conjunctiva clear, lids normal.  ENMT: Lips without lesions, good dentition, oropharynx clear.  Neck: Trachea midline, no masses, no thyromegaly. No cervical or supraclavicular lymphadenopathy  Respiratory: Unlabored respiratory effort, lungs clear to auscultation, no wheezes, no ronchi.  Cardiovascular: Normal S1, S2, no murmur, no edema.  Abdomen: Soft, non-tender, no masses, no hepatosplenomegaly.  Psych: Alert and oriented x3, normal affect and mood.       The following treatment plan was discussed  Assessment & Plan  1. Left shoulder tendinopathy.  The MRI results indicate tendinopathy, characterized by inflammation and irritation of the tendons. There is no evidence of a tear or rupture. The bone structure is normal, with no fractures or signs of bone cancer. The AC joint and glenohumeral joint alignment are satisfactory. Cartilage integrity is maintained, and there are no joint effusions. The labrum is intact without any tears. However, there is minor fraying and irregularity in the tendon, along with a low-grade partial thickness tear. No full-thickness tears of the rotator cuff were observed. Overall, there is mild tendinopathy of the rotator cuff. She is advised to apply ice to the affected area, rest, and engage in physical therapy. She can take Aleve for a week to help with inflammation. A referral to an orthopedic surgeon will be provided for further consultation. If there is no improvement with physical therapy, a joint injection may be considered, and a referral to a sports medicine specialist will be made.    2. Osteoporosis.  The DEXA scan conducted last year revealed a significant reduction in bone density, indicative of osteoporosis. She is advised to resume her Fosamax, calcium, and vitamin D supplements. She should also perform regular stretches and maintain a  daily water intake of 6 to 8 cups to prevent muscle spasms.    3. Vaginal bleeding.  The vaginal bleeding ceased following the discontinuation of the vaginal estrogen cream. She is advised to consult her OB/GYN regarding the recurrence of incontinence and hot flashes, and to discuss the possibility of resuming the cream at a lower dose or frequency. An ultrasound will be ordered to monitor the condition annually.    4. Eye floaters.  The presence of floaters is not a cause for concern. She is advised to consult her optometrist and request an examination of her retinas.    5. Health maintenance.  She is advised to receive her influenza vaccine today. She can get the RSV vaccine at the pharmacy. She is also advised to continue her annual mammograms. A mammogram will be ordered for January 2025.    6. Urinary incontinence.  The urinary incontinence could be due to the discontinuation of estrogen. She is advised to consult her OB/GYN regarding the recurrence of incontinence and hot flashes, and to discuss the possibility of resuming the cream at a lower dose or frequency.    7. Thirst and fatigue.  Her blood sugar levels are borderline, but not high enough to cause severe thirst. Her A1c is 5.5, and her electrolyte levels are within normal range. An A1c test will be ordered. A thyroid function test will also be ordered to rule out any imbalances.    Follow-up  The patient will follow up in 3 months for lab review.    1. Tendinopathy of left shoulder  - Referral to Orthopedics    2. Acute pain of left shoulder  - Referral to Orthopedics    3. Nontraumatic incomplete tear of left rotator cuff  - Referral to Orthopedics    4. Need for vaccination  - Influenza Vaccine, High Dose (65+ Only)    5. Vitamin D deficiency  - VITAMIN D,25 HYDROXY (DEFICIENCY); Future    6. Elevated alkaline phosphatase level  - Comp Metabolic Panel; Future    7. Mixed hyperlipidemia  - Lipid Profile; Future    8. Vagina bleeding  - CBC WITH  DIFFERENTIAL; Future  - TSH WITH REFLEX TO FT4; Future    9. Elevated fasting glucose  - HEMOGLOBIN A1C; Future    10. Encounter for screening mammogram for malignant neoplasm of breast  - MA-SCREENING MAMMO BILAT W/CAD; Future      Followup: Return in about 3 months (around 3/12/2025) for Lab Review, pls fax mammo order to [facility].  Verbal consent was acquired by the patient to use [employer] ambient listening note generation during this visit Yes

## 2025-02-04 DIAGNOSIS — R93.89 ENDOMETRIAL THICKENING ON ULTRASOUND: ICD-10-CM

## 2025-03-03 ENCOUNTER — TELEMEDICINE (OUTPATIENT)
Dept: GYNECOLOGY | Facility: CLINIC | Age: 70
End: 2025-03-03
Payer: MEDICARE

## 2025-03-03 DIAGNOSIS — N95.0 POSTMENOPAUSAL BLEEDING: ICD-10-CM

## 2025-03-03 PROCEDURE — 99213 OFFICE O/P EST LOW 20 MIN: CPT | Mod: 95 | Performed by: STUDENT IN AN ORGANIZED HEALTH CARE EDUCATION/TRAINING PROGRAM

## 2025-03-03 NOTE — PROGRESS NOTES
This evaluation was conducted via  telephone  using secure and encrypted videoconferencing technology. The patient was in their home in the [facility].    The patient's identity was confirmed and verbal consent was obtained for this virtual visit.    Preoperative Visit - Minimally Invasive Gynecologic Surgery     CC: Preop visit    HPI: Patient with postmenopausal bleeding.  Unable to perform endometrial biopsy in the office due to stenotic cervical os.       Pelvic US June 2024 with 9.9mm endo linning.  Repeat ultrasound in January 2024 with 14 mm endometrial lining.  Report on media.  She denies any postmenopausal bleeding simplyrecently.      ROS: I have reviewed all systems with patient. Pertinent positive and negative findings are listed below except for what is otherwise stated in the History of Present Illness.     Physical Exam:    Virtual visit     Assessment:    69-year-old postmenopausal  postmenopausal bleeding  Endometrial lining 14 mm  Previously on estradiol cream, discontinued since bleeding episodes.  No bleeding since September    Plan:  Despite absence of bleeding, there is increased in thickness of her endometrial lining from 9 to 14 mm.  Therefore recommend proceeding with diagnostic hysteroscopy, dilation and curettage under anesthesia.    Surgery counseling  After detailed counseling about all treatment options and shared decision-making, patient opts to proceed with surgery. Discussed all risks of surgery including but not limited to infection, bleeding, damage to bowel, bladder, ureters, vessels, nerves, conversion to laparotomy, possible need for blood transfusion.     Specifically she was counselled as to what to expect on the day of surgery. She will likely go home on the same day after the surgery.  Discussed trajectory of recovery, including maximizing NSAIDs and Tylenol. Discussed restrictions including heavy lifting that requires straining, driving while on narcotics, nothing in  the vagina and no bathing/swimming for at least 2 weeks until evaluated in the office .  She has expressed understanding about the procedure, risks, benefits, and recovery.            Tonya Nolan MD

## 2025-03-06 ENCOUNTER — APPOINTMENT (OUTPATIENT)
Dept: ADMISSIONS | Facility: MEDICAL CENTER | Age: 70
End: 2025-03-06
Attending: STUDENT IN AN ORGANIZED HEALTH CARE EDUCATION/TRAINING PROGRAM
Payer: MEDICARE

## 2025-03-07 ENCOUNTER — PRE-ADMISSION TESTING (OUTPATIENT)
Dept: ADMISSIONS | Facility: MEDICAL CENTER | Age: 70
End: 2025-03-07
Attending: STUDENT IN AN ORGANIZED HEALTH CARE EDUCATION/TRAINING PROGRAM
Payer: MEDICARE

## 2025-03-07 VITALS — HEIGHT: 63 IN | BODY MASS INDEX: 26.57 KG/M2

## 2025-03-07 DIAGNOSIS — Z01.810 PRE-OPERATIVE CARDIOVASCULAR EXAMINATION: ICD-10-CM

## 2025-03-07 RX ORDER — ASPIRIN 81 MG/1
81 TABLET ORAL DAILY
COMMUNITY

## 2025-03-07 RX ORDER — GRAPE SEED OIL 100 %
OIL (ML) MISCELLANEOUS DAILY
COMMUNITY
End: 2025-04-18

## 2025-03-07 NOTE — PREADMIT AVS NOTE
Current Medications   Medication Instructions    aspirin 81 MG EC tablet Follow instructions from surgeon or specialist.    Misc Natural Products (IMMUNE FORMULA PO) Stop 7 days before surgery    Iodine, Kelp, (KELP PO) Stop 7 days before surgery    MAGNESIUM OXIDE PO Stop 7 days before surgery    CRANBERRY PO Stop 7 days before surgery    [place] WORT PO Stop 7 days before surgery    APPLE CIDER VINEGAR PO Stop 7 days before surgery    Pyridoxine HCl (B-6 PO) Stop 7 days before surgery    Cyanocobalamin (B-12 PO) Stop 7 days before surgery    VITAMIN E PO Stop 7 days before surgery    Grape Seed Oil Stop 7 days before surgery    GARLIC PO Stop 7 days before surgery    Turmeric (QC TUMERIC COMPLEX PO) Stop 7 days before surgery    Menaquinone-7 (K2 PO) Stop 7 days before surgery    Flax Oil-Fish Oil-Borage Oil (FISH-FLAX-BORAGE) Cap Stop 7 days before surgery    Probiotic Product (DIGESTIVE ADVANTAGE PO) Stop 7 days before surgery    FIBER GUMMIES PO Stop 7 days before surgery    Ginkgo Biloba (GINKOBA PO) Stop 7 days before surgery    Misc Natural Products (BEET ROOT PO) Stop 7 days before surgery    TART CHERRY PO Stop 7 days before surgery    amLODIPine (NORVASC) 5 MG Tab Continue taking medication as prescribed, including morning of procedure     dicyclomine (BENTYL) 10 MG Cap Hold medication day of procedure         simvastatin (ZOCOR) 20 MG Tab Continue    pantoprazole (PROTONIX) 40 MG Tablet Delayed Response Continue taking medication as prescribed, including morning of procedure     famotidine (PEPCID) 40 MG Tab Continue taking medication as prescribed, including morning of procedure     losartan (COZAAR) 100 MG Tab Stop 24 hours before surgery    solifenacin (VESICARE) 10 MG tablet Continue taking medication as prescribed, including morning of procedure     vitamin D (CHOLECALCIFEROL) 1000 UNIT Tab Stop 7 days before surgery     
100

## 2025-03-07 NOTE — PREPROCEDURE INSTRUCTIONS
PreAdmit Telephone Appointment: Reviewed the Preparing for your procedure handout with patient over the phone. Patient instructed per pharmacy guidelines regarding taking, holding or contacting provider for instructions on regularly prescribed medications before surgery. Instructed to take the following medications the day of surgery with a sip of water per pharmacy medication guidelines: Norvasc, Protonix, Pepcid, Vesicare    Confirmed with patient where to check in morning of surgery. AVS sent to patient's My Chart.

## 2025-03-11 ENCOUNTER — HOSPITAL ENCOUNTER (OUTPATIENT)
Dept: LAB | Facility: MEDICAL CENTER | Age: 70
End: 2025-03-11
Attending: FAMILY MEDICINE
Payer: MEDICARE

## 2025-03-11 ENCOUNTER — NON-PROVIDER VISIT (OUTPATIENT)
Dept: MEDICAL GROUP | Facility: PHYSICIAN GROUP | Age: 70
End: 2025-03-11
Payer: MEDICARE

## 2025-03-11 DIAGNOSIS — E55.9 VITAMIN D DEFICIENCY: ICD-10-CM

## 2025-03-11 DIAGNOSIS — E78.2 MIXED HYPERLIPIDEMIA: ICD-10-CM

## 2025-03-11 DIAGNOSIS — R73.01 ELEVATED FASTING GLUCOSE: ICD-10-CM

## 2025-03-11 DIAGNOSIS — R74.8 ELEVATED ALKALINE PHOSPHATASE LEVEL: ICD-10-CM

## 2025-03-11 DIAGNOSIS — N93.9 VAGINA BLEEDING: ICD-10-CM

## 2025-03-11 DIAGNOSIS — Z01.810 ENCOUNTER FOR PREPROCEDURAL CARDIOVASCULAR EXAMINATION: ICD-10-CM

## 2025-03-11 PROCEDURE — 93000 ELECTROCARDIOGRAM COMPLETE: CPT

## 2025-03-11 NOTE — PROGRESS NOTES
Ruth Shoemaker is a 69 y.o. female here for a non-provider visit for EKG    If abnormal was an in office provider notified today (if so, indicate provider)? No    Routed to PCP? No

## 2025-03-12 ENCOUNTER — HOSPITAL ENCOUNTER (OUTPATIENT)
Dept: LAB | Facility: MEDICAL CENTER | Age: 70
End: 2025-03-12
Attending: FAMILY MEDICINE
Payer: MEDICARE

## 2025-03-12 LAB
BASOPHILS # BLD AUTO: 0.7 % (ref 0–1.8)
BASOPHILS # BLD: 0.05 K/UL (ref 0–0.12)
EOSINOPHIL # BLD AUTO: 0.12 K/UL (ref 0–0.51)
EOSINOPHIL NFR BLD: 1.8 % (ref 0–6.9)
ERYTHROCYTE [DISTWIDTH] IN BLOOD BY AUTOMATED COUNT: 42.9 FL (ref 35.9–50)
EST. AVERAGE GLUCOSE BLD GHB EST-MCNC: 111 MG/DL
HBA1C MFR BLD: 5.5 % (ref 4–5.6)
HCT VFR BLD AUTO: 47.6 % (ref 37–47)
HGB BLD-MCNC: 15.5 G/DL (ref 12–16)
IMM GRANULOCYTES # BLD AUTO: 0.01 K/UL (ref 0–0.11)
IMM GRANULOCYTES NFR BLD AUTO: 0.1 % (ref 0–0.9)
LYMPHOCYTES # BLD AUTO: 2.26 K/UL (ref 1–4.8)
LYMPHOCYTES NFR BLD: 33.8 % (ref 22–41)
MCH RBC QN AUTO: 29.6 PG (ref 27–33)
MCHC RBC AUTO-ENTMCNC: 32.6 G/DL (ref 32.2–35.5)
MCV RBC AUTO: 90.8 FL (ref 81.4–97.8)
MONOCYTES # BLD AUTO: 0.5 K/UL (ref 0–0.85)
MONOCYTES NFR BLD AUTO: 7.5 % (ref 0–13.4)
NEUTROPHILS # BLD AUTO: 3.75 K/UL (ref 1.82–7.42)
NEUTROPHILS NFR BLD: 56.1 % (ref 44–72)
NRBC # BLD AUTO: 0 K/UL
NRBC BLD-RTO: 0 /100 WBC (ref 0–0.2)
PLATELET # BLD AUTO: 201 K/UL (ref 164–446)
PMV BLD AUTO: 13 FL (ref 9–12.9)
RBC # BLD AUTO: 5.24 M/UL (ref 4.2–5.4)
WBC # BLD AUTO: 6.7 K/UL (ref 4.8–10.8)

## 2025-03-12 PROCEDURE — 80053 COMPREHEN METABOLIC PANEL: CPT

## 2025-03-12 PROCEDURE — 80061 LIPID PANEL: CPT

## 2025-03-12 PROCEDURE — 84443 ASSAY THYROID STIM HORMONE: CPT

## 2025-03-12 PROCEDURE — 82306 VITAMIN D 25 HYDROXY: CPT

## 2025-03-12 PROCEDURE — 83036 HEMOGLOBIN GLYCOSYLATED A1C: CPT | Mod: GA

## 2025-03-12 PROCEDURE — 85025 COMPLETE CBC W/AUTO DIFF WBC: CPT

## 2025-03-12 PROCEDURE — 36415 COLL VENOUS BLD VENIPUNCTURE: CPT

## 2025-03-13 LAB
25(OH)D3 SERPL-MCNC: 57 NG/ML (ref 30–100)
ALBUMIN SERPL BCP-MCNC: 5 G/DL (ref 3.2–4.9)
ALBUMIN/GLOB SERPL: 1.6 G/DL
ALP SERPL-CCNC: 153 U/L (ref 30–99)
ALT SERPL-CCNC: 185 U/L (ref 2–50)
ANION GAP SERPL CALC-SCNC: 14 MMOL/L (ref 7–16)
AST SERPL-CCNC: 73 U/L (ref 12–45)
BILIRUB SERPL-MCNC: 0.8 MG/DL (ref 0.1–1.5)
BUN SERPL-MCNC: 16 MG/DL (ref 8–22)
CALCIUM ALBUM COR SERPL-MCNC: 9.4 MG/DL (ref 8.5–10.5)
CALCIUM SERPL-MCNC: 10.2 MG/DL (ref 8.5–10.5)
CHLORIDE SERPL-SCNC: 100 MMOL/L (ref 96–112)
CHOLEST SERPL-MCNC: 172 MG/DL (ref 100–199)
CO2 SERPL-SCNC: 24 MMOL/L (ref 20–33)
CREAT SERPL-MCNC: 1.02 MG/DL (ref 0.5–1.4)
GFR SERPLBLD CREATININE-BSD FMLA CKD-EPI: 60 ML/MIN/1.73 M 2
GLOBULIN SER CALC-MCNC: 3.2 G/DL (ref 1.9–3.5)
GLUCOSE SERPL-MCNC: 97 MG/DL (ref 65–99)
HDLC SERPL-MCNC: 53 MG/DL
LDLC SERPL CALC-MCNC: 68 MG/DL
POTASSIUM SERPL-SCNC: 4 MMOL/L (ref 3.6–5.5)
PROT SERPL-MCNC: 8.2 G/DL (ref 6–8.2)
SODIUM SERPL-SCNC: 138 MMOL/L (ref 135–145)
TRIGL SERPL-MCNC: 257 MG/DL (ref 0–149)
TSH SERPL DL<=0.005 MIU/L-ACNC: 4.4 UIU/ML (ref 0.38–5.33)

## 2025-03-18 ENCOUNTER — OFFICE VISIT (OUTPATIENT)
Dept: MEDICAL GROUP | Facility: PHYSICIAN GROUP | Age: 70
End: 2025-03-18
Payer: MEDICARE

## 2025-03-18 VITALS
DIASTOLIC BLOOD PRESSURE: 84 MMHG | WEIGHT: 152 LBS | RESPIRATION RATE: 16 BRPM | BODY MASS INDEX: 26.93 KG/M2 | SYSTOLIC BLOOD PRESSURE: 150 MMHG | TEMPERATURE: 98.2 F | OXYGEN SATURATION: 95 % | HEART RATE: 86 BPM | HEIGHT: 63 IN

## 2025-03-18 DIAGNOSIS — R79.89 ELEVATED LFTS: ICD-10-CM

## 2025-03-18 DIAGNOSIS — R73.01 ELEVATED FASTING GLUCOSE: ICD-10-CM

## 2025-03-18 PROCEDURE — 3079F DIAST BP 80-89 MM HG: CPT | Performed by: FAMILY MEDICINE

## 2025-03-18 PROCEDURE — 3077F SYST BP >= 140 MM HG: CPT | Performed by: FAMILY MEDICINE

## 2025-03-18 PROCEDURE — 99214 OFFICE O/P EST MOD 30 MIN: CPT | Performed by: FAMILY MEDICINE

## 2025-03-18 ASSESSMENT — FIBROSIS 4 INDEX: FIB4 SCORE: 1.82

## 2025-03-18 ASSESSMENT — PATIENT HEALTH QUESTIONNAIRE - PHQ9: CLINICAL INTERPRETATION OF PHQ2 SCORE: 0

## 2025-03-20 NOTE — PROGRESS NOTES
Subjective:   Ruth Shoemaker is a 69 y.o. female here today for evaluation and management of:     History of Present Illness  The patient presents for evaluation of endometrial hypertrophy, shoulder sprain, elevated blood pressure, and health maintenance.    She has been experiencing bleeding from various sites, which she attributes to her recent transition from soda to sparkling cider. She has also reduced her coffee intake. She has discontinued all vitamin supplements. She has a persistent cough and requests a lung examination. She recently underwent an EKG, the results of which were normal. She does not monitor her blood pressure at home, despite having a cuff. She is currently on losartan and amlodipine for blood pressure management. She has a mammogram scheduled. She has a dentist appointment scheduled.    She has been under the care of an OB/GYN specialist who attempted a biopsy but was unsuccessful due to stenosis. A subsequent biopsy was also unsuccessful, leading to a decision to perform the procedure under anesthesia in a hospital setting, scheduled for 2025. She has no prior surgical history, except for a nasal reconstruction following an accident and childbirth.    She recently sought medical attention for severe abdominal pain, which was managed with analgesics. She has a history of colitis and is scheduled for a colonoscopy next year. She has been experiencing urinary incontinence and was referred to a specialist for this issue.    She consulted a physician for shoulder pain, who diagnosed a minor separation and recommended physical therapy at [place]. She declined steroid treatment and was advised to take Tylenol up to the day of her surgery. She has been managing her pain with Tylenol, taking a maximum of two tablets daily, typically one before bedtime.    FAMILY HISTORY  Her mother developed dementia after heart surgery and  two weeks later. Her father also experienced similar  issues with anesthesia.    MEDICATIONS  Current: Losartan, amlodipine, Tylenol          Current medicines (including changes today)  Current Outpatient Medications   Medication Sig Dispense Refill    aspirin 81 MG EC tablet Take 81 mg by mouth every day.      Misc Natural Products (IMMUNE FORMULA PO) Take  by mouth every day.      Iodine, Kelp, (KELP PO) Take  by mouth every day.      MAGNESIUM OXIDE PO Take  by mouth every day.      CRANBERRY PO Take  by mouth every day.      [place] WORT PO Take  by mouth every day.      APPLE CIDER VINEGAR PO Take  by mouth every day.      Pyridoxine HCl (B-6 PO) Take  by mouth every day.      Cyanocobalamin (B-12 PO) Take  by mouth.      VITAMIN E PO Take  by mouth.      Grape Seed Oil Take  by mouth every day.      GARLIC PO Take  by mouth every day.      Turmeric (QC TUMERIC COMPLEX PO) Take  by mouth every day.      Menaquinone-7 (K2 PO) Take  by mouth every day.      Flax Oil-Fish Oil-Borage Oil (FISH-FLAX-BORAGE) Cap Take  by mouth every day.      Probiotic Product (DIGESTIVE ADVANTAGE PO) Take  by mouth every day.      FIBER GUMMIES PO Take  by mouth every day.      Ginkgo Biloba (GINKOBA PO) Take  by mouth every day.      Misc Natural Products (BEET ROOT PO) Take  by mouth every day.      TART CHERRY PO Take  by mouth every day.      amLODIPine (NORVASC) 5 MG Tab TAKE 1 TABLET BY MOUTH ONCE DAILY FOR HIGH BLOOD PRESSURE 90 Tablet 3    dicyclomine (BENTYL) 10 MG Cap TAKE 1 CAPSULE BY MOUTH 4 TIMES DAILY BEFORE MEAL(S) AND AT BEDTIME (Patient taking differently: 2 (two) times a day. TAKE 1 CAPSULE BY MOUTH 4 TIMES DAILY BEFORE MEAL(S) AND AT BEDTIME) 360 Capsule 3    simvastatin (ZOCOR) 20 MG Tab TAKE 1 TABLET BY MOUTH ONCE DAILY IN THE EVENING 90 Tablet 3    pantoprazole (PROTONIX) 40 MG Tablet Delayed Response Take 1 tablet by mouth once daily 90 Tablet 3    famotidine (PEPCID) 40 MG Tab Take 1 tablet by mouth twice daily 180 Tablet 3    losartan (COZAAR) 100 MG Tab Take  "1 tablet by mouth once daily 90 Tablet 3    solifenacin (VESICARE) 10 MG tablet Take 1 Tablet by mouth every day. Brand name only--please dispense brand name as pt does not tolerate the generic version of medication, please dispense 90 Tablet 3    vitamin D (CHOLECALCIFEROL) 1000 UNIT Tab Take 1,000 Units by mouth every day.       No current facility-administered medications for this visit.     She  has a past medical history of GERD (gastroesophageal reflux disease), Gynecological disorder, Hemorrhagic disorder (HCC), High cholesterol, Hyperlipidemia, Hypertension, PONV (postoperative nausea and vomiting), Urinary bladder disorder, and Urinary incontinence.    ROS  No chest pain, no shortness of breath, no abdominal pain       Objective:     BP (!) 150/84   Pulse 86   Temp 36.8 °C (98.2 °F) (Temporal)   Resp 16   Ht 1.6 m (5' 3\")   Wt 68.9 kg (152 lb)   SpO2 95%  Body mass index is 26.93 kg/m².   Physical Exam:  Constitutional: Alert, no distress.  Skin: Warm, dry, good turgor, no rashes in visible areas.  Eye: Equal, round and reactive, conjunctiva clear, lids normal.  ENMT: Lips without lesions, good dentition, oropharynx clear.  Neck: Trachea midline, no masses, no thyromegaly. No cervical or supraclavicular lymphadenopathy  Respiratory: Unlabored respiratory effort, lungs clear to auscultation, no wheezes, no ronchi.  Cardiovascular: Normal S1, S2, no murmur, no edema.  Abdomen: Soft, non-tender, no masses, no hepatosplenomegaly.  Psych: Alert and oriented x3, normal affect and mood.       The following treatment plan was discussed  Assessment & Plan  1. Endometrial hypertrophy.  The patient has been informed that the condition is benign and does not involve any cancerous cells. A biopsy will be performed under anesthesia on 03/31/2025 to ensure there are no cancer cells in the lining. If atypical cells are found, a hysterectomy will be discussed. The patient has been reassured about the safety of the " procedure and advised to discuss any concerns regarding anesthesia with the anesthesiologist.    2. Shoulder sprain.  The patient has been advised to apply ice to the affected area to reduce inflammation and numb the pain. Physical therapy has been recommended, and the patient will consider it after the upcoming surgery.    3. Elevated liver enzymes.  The patient's liver enzymes are elevated, possibly due to recent abdominal issues. The patient has been advised to avoid sugary drinks and maintain a healthy diet. Fasting labs have been ordered to monitor liver enzymes, and the patient will follow up in 6 months.    4. Elevated blood pressure.  The patient's blood pressure is slightly elevated today. The patient has been advised to monitor blood pressure at home, especially after taking losartan and amlodipine. If the blood pressure exceeds 140/90, an additional dose of amlodipine 5 mg can be taken. The patient will send a message if an adjustment in the prescription is needed.    5. Health maintenance.  The patient has a mammogram scheduled and will follow up with the dentist. The patient has been advised to stay hydrated, especially before any surgeries, and to continue taking prescribed medications, including blood pressure and cholesterol medications.    Follow-up  The patient will follow up in 3 months.    PROCEDURE  The patient has a history of nasal reconstruction following an accident.    1. Elevated fasting glucose    2. Elevated LFTs  - Comp Metabolic Panel; Future      Followup: Return in about 6 months (around 9/18/2025) for Lab Review.  Verbal consent was acquired by the patient to use [employer] ambient listening note generation during this visit Yes

## 2025-03-25 DIAGNOSIS — K21.9 GASTROESOPHAGEAL REFLUX DISEASE WITHOUT ESOPHAGITIS: ICD-10-CM

## 2025-03-25 DIAGNOSIS — E78.2 MIXED HYPERLIPIDEMIA: ICD-10-CM

## 2025-03-27 RX ORDER — FAMOTIDINE 40 MG/1
40 TABLET, FILM COATED ORAL 2 TIMES DAILY
Qty: 180 TABLET | Refills: 3 | Status: SHIPPED | OUTPATIENT
Start: 2025-03-27

## 2025-03-27 RX ORDER — PANTOPRAZOLE SODIUM 40 MG/1
40 TABLET, DELAYED RELEASE ORAL DAILY
Qty: 90 TABLET | Refills: 3 | Status: SHIPPED | OUTPATIENT
Start: 2025-03-27

## 2025-03-27 RX ORDER — LOSARTAN POTASSIUM 100 MG/1
100 TABLET ORAL DAILY
Qty: 90 TABLET | Refills: 3 | Status: SHIPPED | OUTPATIENT
Start: 2025-03-27

## 2025-03-27 RX ORDER — SIMVASTATIN 20 MG
20 TABLET ORAL EVERY EVENING
Qty: 90 TABLET | Refills: 3 | Status: SHIPPED | OUTPATIENT
Start: 2025-03-27

## 2025-03-31 ENCOUNTER — HOSPITAL ENCOUNTER (OUTPATIENT)
Facility: MEDICAL CENTER | Age: 70
End: 2025-03-31
Attending: STUDENT IN AN ORGANIZED HEALTH CARE EDUCATION/TRAINING PROGRAM | Admitting: STUDENT IN AN ORGANIZED HEALTH CARE EDUCATION/TRAINING PROGRAM
Payer: MEDICARE

## 2025-03-31 ENCOUNTER — ANESTHESIA (OUTPATIENT)
Facility: MEDICAL CENTER | Age: 70
End: 2025-03-31
Payer: MEDICARE

## 2025-03-31 ENCOUNTER — ANESTHESIA EVENT (OUTPATIENT)
Facility: MEDICAL CENTER | Age: 70
End: 2025-03-31
Payer: MEDICARE

## 2025-03-31 VITALS
WEIGHT: 151.68 LBS | SYSTOLIC BLOOD PRESSURE: 137 MMHG | BODY MASS INDEX: 26.88 KG/M2 | TEMPERATURE: 98.1 F | HEART RATE: 73 BPM | DIASTOLIC BLOOD PRESSURE: 69 MMHG | HEIGHT: 63 IN | RESPIRATION RATE: 12 BRPM | OXYGEN SATURATION: 94 %

## 2025-03-31 LAB — PATHOLOGY CONSULT NOTE: NORMAL

## 2025-03-31 PROCEDURE — 160002 HCHG RECOVERY MINUTES (STAT): Performed by: STUDENT IN AN ORGANIZED HEALTH CARE EDUCATION/TRAINING PROGRAM

## 2025-03-31 PROCEDURE — 700102 HCHG RX REV CODE 250 W/ 637 OVERRIDE(OP): Performed by: STUDENT IN AN ORGANIZED HEALTH CARE EDUCATION/TRAINING PROGRAM

## 2025-03-31 PROCEDURE — 88305 TISSUE EXAM BY PATHOLOGIST: CPT | Mod: 26 | Performed by: STUDENT IN AN ORGANIZED HEALTH CARE EDUCATION/TRAINING PROGRAM

## 2025-03-31 PROCEDURE — 88305 TISSUE EXAM BY PATHOLOGIST: CPT | Performed by: STUDENT IN AN ORGANIZED HEALTH CARE EDUCATION/TRAINING PROGRAM

## 2025-03-31 PROCEDURE — 160036 HCHG PACU - EA ADDL 30 MINS PHASE I: Performed by: STUDENT IN AN ORGANIZED HEALTH CARE EDUCATION/TRAINING PROGRAM

## 2025-03-31 PROCEDURE — 160025 RECOVERY II MINUTES (STATS): Performed by: STUDENT IN AN ORGANIZED HEALTH CARE EDUCATION/TRAINING PROGRAM

## 2025-03-31 PROCEDURE — 58558 HYSTEROSCOPY BIOPSY: CPT | Performed by: STUDENT IN AN ORGANIZED HEALTH CARE EDUCATION/TRAINING PROGRAM

## 2025-03-31 PROCEDURE — 700111 HCHG RX REV CODE 636 W/ 250 OVERRIDE (IP): Performed by: ANESTHESIOLOGY

## 2025-03-31 PROCEDURE — 160035 HCHG PACU - 1ST 60 MINS PHASE I: Performed by: STUDENT IN AN ORGANIZED HEALTH CARE EDUCATION/TRAINING PROGRAM

## 2025-03-31 PROCEDURE — 160046 HCHG PACU - 1ST 60 MINS PHASE II: Performed by: STUDENT IN AN ORGANIZED HEALTH CARE EDUCATION/TRAINING PROGRAM

## 2025-03-31 PROCEDURE — 160048 HCHG OR STATISTICAL LEVEL 1-5: Performed by: STUDENT IN AN ORGANIZED HEALTH CARE EDUCATION/TRAINING PROGRAM

## 2025-03-31 PROCEDURE — 160009 HCHG ANES TIME/MIN: Performed by: STUDENT IN AN ORGANIZED HEALTH CARE EDUCATION/TRAINING PROGRAM

## 2025-03-31 PROCEDURE — 700101 HCHG RX REV CODE 250: Performed by: ANESTHESIOLOGY

## 2025-03-31 PROCEDURE — 160027 HCHG SURGERY MINUTES - 1ST 30 MINS LEVEL 2: Performed by: STUDENT IN AN ORGANIZED HEALTH CARE EDUCATION/TRAINING PROGRAM

## 2025-03-31 PROCEDURE — 700105 HCHG RX REV CODE 258: Performed by: STUDENT IN AN ORGANIZED HEALTH CARE EDUCATION/TRAINING PROGRAM

## 2025-03-31 PROCEDURE — 160015 HCHG STAT PREOP MINUTES: Performed by: STUDENT IN AN ORGANIZED HEALTH CARE EDUCATION/TRAINING PROGRAM

## 2025-03-31 PROCEDURE — A9270 NON-COVERED ITEM OR SERVICE: HCPCS | Performed by: STUDENT IN AN ORGANIZED HEALTH CARE EDUCATION/TRAINING PROGRAM

## 2025-03-31 PROCEDURE — 160038 HCHG SURGERY MINUTES - EA ADDL 1 MIN LEVEL 2: Performed by: STUDENT IN AN ORGANIZED HEALTH CARE EDUCATION/TRAINING PROGRAM

## 2025-03-31 RX ORDER — EPHEDRINE SULFATE 50 MG/ML
5 INJECTION, SOLUTION INTRAVENOUS
Status: DISCONTINUED | OUTPATIENT
Start: 2025-03-31 | End: 2025-03-31 | Stop reason: HOSPADM

## 2025-03-31 RX ORDER — DEXAMETHASONE SODIUM PHOSPHATE 4 MG/ML
INJECTION, SOLUTION INTRA-ARTICULAR; INTRALESIONAL; INTRAMUSCULAR; INTRAVENOUS; SOFT TISSUE PRN
Status: DISCONTINUED | OUTPATIENT
Start: 2025-03-31 | End: 2025-03-31 | Stop reason: SURG

## 2025-03-31 RX ORDER — OXYCODONE HCL 5 MG/5 ML
10 SOLUTION, ORAL ORAL
Status: DISCONTINUED | OUTPATIENT
Start: 2025-03-31 | End: 2025-03-31 | Stop reason: HOSPADM

## 2025-03-31 RX ORDER — SODIUM CHLORIDE, SODIUM LACTATE, POTASSIUM CHLORIDE, CALCIUM CHLORIDE 600; 310; 30; 20 MG/100ML; MG/100ML; MG/100ML; MG/100ML
INJECTION, SOLUTION INTRAVENOUS CONTINUOUS
Status: ACTIVE | OUTPATIENT
Start: 2025-03-31 | End: 2025-03-31

## 2025-03-31 RX ORDER — HYDRALAZINE HYDROCHLORIDE 20 MG/ML
5 INJECTION INTRAMUSCULAR; INTRAVENOUS
Status: DISCONTINUED | OUTPATIENT
Start: 2025-03-31 | End: 2025-03-31 | Stop reason: HOSPADM

## 2025-03-31 RX ORDER — HALOPERIDOL 5 MG/ML
1 INJECTION INTRAMUSCULAR
Status: DISCONTINUED | OUTPATIENT
Start: 2025-03-31 | End: 2025-03-31 | Stop reason: HOSPADM

## 2025-03-31 RX ORDER — SUCCINYLCHOLINE CHLORIDE 20 MG/ML
INJECTION INTRAMUSCULAR; INTRAVENOUS PRN
Status: DISCONTINUED | OUTPATIENT
Start: 2025-03-31 | End: 2025-03-31 | Stop reason: SURG

## 2025-03-31 RX ORDER — KETOROLAC TROMETHAMINE 15 MG/ML
INJECTION, SOLUTION INTRAMUSCULAR; INTRAVENOUS PRN
Status: DISCONTINUED | OUTPATIENT
Start: 2025-03-31 | End: 2025-03-31 | Stop reason: SURG

## 2025-03-31 RX ORDER — OXYCODONE HCL 5 MG/5 ML
5 SOLUTION, ORAL ORAL
Status: DISCONTINUED | OUTPATIENT
Start: 2025-03-31 | End: 2025-03-31 | Stop reason: HOSPADM

## 2025-03-31 RX ORDER — DIPHENHYDRAMINE HYDROCHLORIDE 50 MG/ML
12.5 INJECTION, SOLUTION INTRAMUSCULAR; INTRAVENOUS
Status: DISCONTINUED | OUTPATIENT
Start: 2025-03-31 | End: 2025-03-31 | Stop reason: HOSPADM

## 2025-03-31 RX ORDER — HYDROMORPHONE HYDROCHLORIDE 1 MG/ML
0.1 INJECTION, SOLUTION INTRAMUSCULAR; INTRAVENOUS; SUBCUTANEOUS
Status: DISCONTINUED | OUTPATIENT
Start: 2025-03-31 | End: 2025-03-31 | Stop reason: HOSPADM

## 2025-03-31 RX ORDER — ONDANSETRON 2 MG/ML
INJECTION INTRAMUSCULAR; INTRAVENOUS PRN
Status: DISCONTINUED | OUTPATIENT
Start: 2025-03-31 | End: 2025-03-31 | Stop reason: SURG

## 2025-03-31 RX ORDER — HYDROMORPHONE HYDROCHLORIDE 1 MG/ML
0.2 INJECTION, SOLUTION INTRAMUSCULAR; INTRAVENOUS; SUBCUTANEOUS
Status: DISCONTINUED | OUTPATIENT
Start: 2025-03-31 | End: 2025-03-31 | Stop reason: HOSPADM

## 2025-03-31 RX ORDER — ONDANSETRON 2 MG/ML
4 INJECTION INTRAMUSCULAR; INTRAVENOUS
Status: DISCONTINUED | OUTPATIENT
Start: 2025-03-31 | End: 2025-03-31 | Stop reason: HOSPADM

## 2025-03-31 RX ORDER — LIDOCAINE HYDROCHLORIDE 20 MG/ML
INJECTION, SOLUTION EPIDURAL; INFILTRATION; INTRACAUDAL; PERINEURAL PRN
Status: DISCONTINUED | OUTPATIENT
Start: 2025-03-31 | End: 2025-03-31 | Stop reason: SURG

## 2025-03-31 RX ORDER — ACETAMINOPHEN 500 MG
1000 TABLET ORAL ONCE
Status: COMPLETED | OUTPATIENT
Start: 2025-03-31 | End: 2025-03-31

## 2025-03-31 RX ORDER — ALBUTEROL SULFATE 5 MG/ML
2.5 SOLUTION RESPIRATORY (INHALATION)
Status: DISCONTINUED | OUTPATIENT
Start: 2025-03-31 | End: 2025-03-31 | Stop reason: HOSPADM

## 2025-03-31 RX ORDER — HYDROMORPHONE HYDROCHLORIDE 1 MG/ML
0.4 INJECTION, SOLUTION INTRAMUSCULAR; INTRAVENOUS; SUBCUTANEOUS
Status: DISCONTINUED | OUTPATIENT
Start: 2025-03-31 | End: 2025-03-31 | Stop reason: HOSPADM

## 2025-03-31 RX ORDER — LABETALOL HYDROCHLORIDE 5 MG/ML
5 INJECTION, SOLUTION INTRAVENOUS
Status: DISCONTINUED | OUTPATIENT
Start: 2025-03-31 | End: 2025-03-31 | Stop reason: HOSPADM

## 2025-03-31 RX ADMIN — DEXAMETHASONE SODIUM PHOSPHATE 6 MG: 4 INJECTION INTRA-ARTICULAR; INTRALESIONAL; INTRAMUSCULAR; INTRAVENOUS; SOFT TISSUE at 08:28

## 2025-03-31 RX ADMIN — FENTANYL CITRATE 50 MCG: 50 INJECTION, SOLUTION INTRAMUSCULAR; INTRAVENOUS at 08:31

## 2025-03-31 RX ADMIN — PROPOFOL 200 MG: 10 INJECTION, EMULSION INTRAVENOUS at 08:24

## 2025-03-31 RX ADMIN — SODIUM CHLORIDE, POTASSIUM CHLORIDE, SODIUM LACTATE AND CALCIUM CHLORIDE: 600; 310; 30; 20 INJECTION, SOLUTION INTRAVENOUS at 06:48

## 2025-03-31 RX ADMIN — LIDOCAINE HYDROCHLORIDE 100 MG: 20 INJECTION, SOLUTION EPIDURAL; INFILTRATION; INTRACAUDAL; PERINEURAL at 08:24

## 2025-03-31 RX ADMIN — SUCCINYLCHOLINE CHLORIDE 40 MG: 20 INJECTION, SOLUTION INTRAMUSCULAR; INTRAVENOUS at 08:26

## 2025-03-31 RX ADMIN — KETOROLAC TROMETHAMINE 15 MG: 15 INJECTION, SOLUTION INTRAMUSCULAR; INTRAVENOUS at 08:48

## 2025-03-31 RX ADMIN — ONDANSETRON 4 MG: 2 INJECTION INTRAMUSCULAR; INTRAVENOUS at 08:48

## 2025-03-31 RX ADMIN — ACETAMINOPHEN 1000 MG: 500 TABLET ORAL at 06:48

## 2025-03-31 ASSESSMENT — FIBROSIS 4 INDEX: FIB4 SCORE: 1.82

## 2025-03-31 ASSESSMENT — PAIN SCALES - GENERAL: PAIN_LEVEL: 0

## 2025-03-31 ASSESSMENT — PAIN DESCRIPTION - PAIN TYPE: TYPE: SURGICAL PAIN

## 2025-03-31 NOTE — ANESTHESIA PROCEDURE NOTES
Airway    Date/Time: 3/31/2025 8:25 AM    Performed by: Tasneem Santamaria M.D.  Authorized by: Tasneem Santamaria M.D.    Location:  OR  Urgency:  Elective  Indications for Airway Management:  Anesthesia      Spontaneous Ventilation: absent    Sedation Level:  Deep  Preoxygenated: Yes    Mask Difficulty Assessment:  0 - not attempted  Final Airway Type:  Supraglottic airway  Final Supraglottic Airway:  Standard LMA    SGA Size:  3  Number of Attempts at Approach:  1

## 2025-03-31 NOTE — ANESTHESIA POSTPROCEDURE EVALUATION
Patient: Ruth Shoemaker    Procedure Summary       Date: 03/31/25 Room / Location: [facility] OR 12 / SURGERY DAY SURGERY [facility]    Anesthesia Start: 0820 Anesthesia Stop: 0858    Procedure: HYSTEROSCOPY, DILATION AND CURETTAGE, AND POLYPECTOMY WITH MYOSURE (Uterus) Diagnosis: (POSTMENOPAUSAL BLEEDING)    Surgeons: Tonya Nolan M.D. Responsible Provider: Tasneem Santamaria M.D.    Anesthesia Type: general ASA Status: 2            Final Anesthesia Type: general  Last vitals  BP   Blood Pressure : 137/69    Temp   36.7 °C (98.1 °F)    Pulse   73   Resp   12    SpO2   94 %      Anesthesia Post Evaluation    Patient location during evaluation: PACU  Patient participation: complete - patient participated  Level of consciousness: awake and alert  Pain score: 0    Airway patency: patent  Anesthetic complications: no  Cardiovascular status: hemodynamically stable  Respiratory status: acceptable  Hydration status: euvolemic    PONV: none          No notable events documented.     Nurse Pain Score: 0 (NPRS)

## 2025-03-31 NOTE — DISCHARGE INSTR - OTHER INFO
Post-op: What to expect after your surgery    For less-urgent matters (Monday - Friday), you may send a message through [employer] or call the general Women's Health line at [phone].     For urgent/emergent post-operative questions or concerns which cannot wait until the next business day, please call  the Gyn Subspecialties on call line at [phone]. You will be directed to the doctor on call.     Vaginal care:   Vaginal discharge and bleeding/spotting is also normal through the first weeks of recovery. Please do not place anything in the vagina for 2 weeks after surgery    Pain management:  You may take ibuprofen 600mg every 6 hours as needed to treat cramping pain after surgery. If this is not enough, please contact your surgeon    Activity restrictions  You may return to all normal activities, as tolerated, but do not drive for 24 hours after surgery.     Return to sex  Please refrain from intercourse for 2 weeks after surgery to allow healing

## 2025-03-31 NOTE — ANESTHESIA PREPROCEDURE EVALUATION
Case: [id number] Date/Time: 03/31/25 0830    Procedure: HYSTEROSCOPY, DILATION AND CURETTAGE, AND ALL INDICATED PROCEDURES    Pre-op diagnosis: POSTMENOPAUSAL BLEEDING    Location: [facility] OR 12 / SURGERY DAY SURGERY [facility]    Surgeons: Tonya Nolan M.D.            Relevant Problems   CARDIAC   (positive) Primary hypertension      GI   (positive) Gastroesophageal reflux disease without esophagitis       Physical Exam    Airway   Mallampati: I  TM distance: >3 FB  Neck ROM: full       Cardiovascular - normal exam     Dental    Pulmonary   Breath sounds clear to auscultation     Abdominal    Neurological - normal exam                   Anesthesia Plan    ASA 2       Plan - general       Airway plan will be LMA          Induction: intravenous    Postoperative Plan: Postoperative administration of opioids is intended.    Pertinent diagnostic labs and testing reviewed    Informed Consent:    Anesthetic plan and risks discussed with patient.

## 2025-03-31 NOTE — ANESTHESIA TIME REPORT
Anesthesia Start and Stop Event Times       Date Time Event    3/31/2025 0806 Ready for Procedure     0820 Anesthesia Start     0858 Anesthesia Stop          Responsible Staff  03/31/25      Name Role Begin End    Tasneem Santamaria M.D. Anesth 0820 0858          Overtime Reason:  no overtime (within assigned shift)    Comments:

## 2025-03-31 NOTE — OR NURSING
Pt allergies and NPO status verified. Home medications reconciled, preferred pharmacy verified. Belongings secured in locker. Pt verbalizes understanding of pain scale, expected course of stay, and plan of care. IV access established. All questions answered. Bed in lowest position, call light within reach.

## 2025-03-31 NOTE — H&P
"Gynecology Preoperative History and Physical    HPI: Ruth Shoemaker is a 69 y.o. who presents for hysteroscopy, dilation and curettage.   Patient is in her usual state of health without complaints.    OB History    Para Term  AB Living   3 2 2  1 2   SAB IAB Ectopic Molar Multiple Live Births   1     2      # Outcome Date GA Lbr Shawn/2nd Weight Sex Type Anes PTL Lv   3 Term 97 40w0d   M Vag-Spont   CHARMAINE   2 Term 10/15/76 40w0d   F Vag-Spont   CHARMAINE   1 SAB 1974             Past Medical History:   Diagnosis Date    GERD (gastroesophageal reflux disease)     Gynecological disorder     Hemorrhagic disorder (HCC)     vaginal/ rectal bleeding    High cholesterol     Hyperlipidemia     Hypertension     on medication    PONV (postoperative nausea and vomiting)     years old    Urinary bladder disorder     Leaking When Cough or Sneezeing    Urinary incontinence      Past Surgical History:   Procedure Laterality Date    COLONOSCOPY      OTHER      surgery for deviated septum       Current Facility-Administered Medications:     lactated ringers infusion, , Intravenous, Continuous, Tonya Nolan M.D., Last Rate: 10 mL/hr at 25 0648, New Bag at 25 0648  Allergies   Allergen Reactions    Codeine Nausea      Family History   Problem Relation Age of Onset    Hypertension Mother     Glaucoma Mother     Diabetes Father     Heart Attack Father     Stroke Father     Heart Disease Father     Hypertension Father     Hyperlipidemia Father     Glaucoma Father      Social History     Tobacco Use    Smoking status: Former     Types: Cigarettes     Passive exposure: Current    Smokeless tobacco: Never    Tobacco comments:     Quit 10 Years Ago, pt unsure of how long she smoked for   Substance Use Topics    Alcohol use: Never        Review of Systems:  Negative except as mentioned in the HPI.    OBJECTIVE  BP (!) 166/77   Pulse 95   Temp 36.2 °C (97.2 °F) (Temporal)   Resp 12   Ht 5' 3\"   Wt 151 lb " 10.8 oz   SpO2 96%   BMI 26.87 kg/m²    General: WD/WN, alert and conversant, lying in bed in NAD  Chest: Normal work of breathing on room air, equal chest rise bilaterally  Cardiac: RRR  Abdomen: Soft, non-distended, non-tender  Pelvis: Deferred for OR  Extremities: No cyanosis or edema  Skin: Warm and dry, no rashes    PLAN  - SCD for DVT prophylaxis  - Pregnancy test negative  - Consent signed   - Proceed to OR when ready    Tonya Nolan MD

## 2025-03-31 NOTE — DISCHARGE INSTRUCTIONS
Post-op: What to expect after your surgery     For less-urgent matters (Monday - Friday), you may send a message through [employer] or call the general Women's Health line at [phone].      For urgent/emergent post-operative questions or concerns which cannot wait until the next business day, please call  the Gyn Subspecialties on call line at [phone]. You will be directed to the doctor on call.      Vaginal care:   Vaginal discharge and bleeding/spotting is also normal through the first weeks of recovery. Please do not place anything in the vagina for 2 weeks after surgery     Pain management:  You may take ibuprofen 600mg every 6 hours as needed to treat cramping pain after surgery. If this is not enough, please contact your surgeon     Activity restrictions  You may return to all normal activities, as tolerated, but do not drive for 24 hours after surgery.      Return to sex  Please refrain from intercourse for 2 weeks after surgery to allow healing      What to Expect Post Anesthesia    Rest and take it easy for the first 24 hours.  A responsible adult is recommended to remain with you during that time.  It is normal to feel sleepy.  We encourage you to not do anything that requires balance, judgment or coordination.    FOR 24 HOURS DO NOT:  Drive, operate machinery or run household appliances.  Drink beer or alcoholic beverages.  Make important decisions or sign legal documents.    To avoid nausea, slowly advance diet as tolerated, avoiding spicy or greasy foods for the first day.  Add more substantial food to your diet according to your provider's instructions.      MILD FLU-LIKE SYMPTOMS ARE NORMAL.  YOU MAY EXPERIENCE GENERALIZED MUSCLE ACHES, THROAT IRRITATION, HEADACHE AND/OR SOME NAUSEA.    If any questions arise, call your provider.  If your provider is not available, please feel free to call the Surgical Center at [phone].    MEDICATIONS: Resume taking daily medication.  Take prescribed  pain medication with food.  If no medication is prescribed, you may take non-aspirin pain medication if needed.  PAIN MEDICATION CAN BE VERY CONSTIPATING.  Take a stool softener or laxative such as senokot, pericolace, or milk of magnesia if needed.      Diet    Resume your normal diet as tolerated.  A diet low in cholesterol, fat, and sodium is recommended for good health.

## 2025-03-31 NOTE — OR NURSING
0856- Arrived to PACU. Report received from anesthesia/OR circulator. Patient care assumed.   Sleeping, respirations spontaneous and non-labored.      0911- Pt remains sleeping, rouses to voice. Respirations are spontaneous and non labored.     0930- Pt rouses to voice. Oxygen saturation to 87% on room air, placed on 2L nasal cannula with improvement to 93%    0945- Called and updated pts . Pt sleeping, rouses to voice.     1000- Pt resting on gurney, denies any pain or nausea.    1015- Pt ambulatory to restroom without incident. Denies any pain or nausea.     1030- Pts  at bedside. Pt changed in to clothes, doing well, denies pain or nausea.     1042- Incentive spirometer provided to the patient. Pt educated on use. Pt demonstrated understanding. Goal 1900 Pt currently inhaling volume of approximately 1900.      *** Patient met criteria for stage II. Patient states good pain control. Denies n/v, tolerating po well.

## 2025-03-31 NOTE — OP REPORT
OPERATIVE REPORT    Name: Ruth Shoemaker   : 1955   MRN: [id number]      PRE-OP DIAGNOSIS:   Postmenopausal bleeding           POST-OP DIAGNOSIS:   Postmenopausal bleeding  Endometrial polyp             PROCEDURE:   Procedure(s):  HYSTEROSCOPY, DILATION AND CURETTAGE, AND POLYPECTOMY WITH MYOSURE              SURGEON:   Tonya Nolan MD - Primary  Ally Blair NP - Assist           ANESTHESIA: General           FINDINGS:   Exam under anesthesia  - Normal external female genitalia  -Vaginal mucosa atrophic with friability/erosion at left sidewall    Hysteroscopy  - Large endometrial polyp - excised  - Normal cavity contour  - Bilateral ostia visualized  - Endometrium with atrophic appearance   - Hysteroscopy deficit 490ml        ESTIMATED BLOOD LOSS: 5 ml           DRAINS: None                  SPECIMENS:   ID Type Source Tests Collected by Time Destination   A : UTERINE POLYP Other Other PATHOLOGY SPECIMEN Tonya Nolan M.D. 3/31/2025  8:45 AM    B : ENDOMETRIAL CURETTINGS Tissue Endometrium PATHOLOGY SPECIMEN Tonya Nolan M.D. 3/31/2025  8:45 AM               IMPLANTS: * No implants in log *           COMPLICATIONS: None           DISPOSITION: Discharge           CONDITION: Stable           INDICATION FOR SURGERY:   69 y.o.  with postmenopausal bleeding who presents for hysteroscopy, dilation and curettage. Patient understood all risks including but not limited to infection, bleeding, damage to bowel, bladder, ureters, vessels, nerves, conversion to laparotomy, and blood transfusion. She had discussed all alternatives and decision was made to proceed with the above procedure. She signed informed consent.       TECHNIQUE:   After obtaining appropriate consent, the patient was taken to the operating room where adequate anesthesia was obtained without difficulty. She was placed in the mid lithotomy position and prepped and draped in the usual sterile fashion. In and out catheterization was  used to empty the bladder.     The anterior lip of the cervix was grasped with a single toothed tenaculum and dilated. A hysteroscope was introduced into the cavity with saline insufflation and a panoramic view achieved with good distension. A panoramic view of the cavity was obtained. Both ostia were identified.  The MyoSure was used to excise the polyp.    The hysteroscope was removed and a circumferential sharp curetting was achieved retrieving a small amount of red-tan tissue.    The procedure was terminated and the instruments were removed. The patient tolerated the procedure well and was taken to the recovery room awake and in stable condition. The sponge, needle and instrument counts were reported to be correct x 2.        Tonya Nolan MD  Minimally Invasive Gynecologic Surgery

## 2025-04-01 ENCOUNTER — RESULTS FOLLOW-UP (OUTPATIENT)
Dept: GYNECOLOGY | Facility: CLINIC | Age: 70
End: 2025-04-01
Payer: MEDICARE

## 2025-04-01 DIAGNOSIS — Z12.31 ENCOUNTER FOR SCREENING MAMMOGRAM FOR MALIGNANT NEOPLASM OF BREAST: ICD-10-CM

## 2025-04-15 ENCOUNTER — TELEMEDICINE (OUTPATIENT)
Dept: GYNECOLOGY | Facility: CLINIC | Age: 70
End: 2025-04-15
Payer: MEDICARE

## 2025-04-15 DIAGNOSIS — Z48.89 POSTOPERATIVE VISIT: ICD-10-CM

## 2025-04-15 DIAGNOSIS — N95.2 VAGINAL ATROPHY: ICD-10-CM

## 2025-04-15 PROCEDURE — 99213 OFFICE O/P EST LOW 20 MIN: CPT | Mod: 95 | Performed by: STUDENT IN AN ORGANIZED HEALTH CARE EDUCATION/TRAINING PROGRAM

## 2025-04-15 RX ORDER — ESTRADIOL 0.1 MG/G
CREAM VAGINAL
Qty: 1 EACH | Refills: 6 | Status: SHIPPED | OUTPATIENT
Start: 2025-04-15

## 2025-04-15 NOTE — PROGRESS NOTES
This evaluation was conducted via telephone using secure and encrypted videoconferencing technology. The patient was in their home in the [facility].    The patient's identity was confirmed and verbal consent was obtained for this virtual visit.    Postoperative Visit - Minimally Invasive Gynecologic Surgery     HPI: 69 y.o. female who presents today for a post-operative visit. She has no complaints.    Surgery performed: HYSTEROSCOPY, DILATION AND CURETTAGE, AND POLYPECTOMY WITH MYOSURE  on 3/31  Complications: none    Reports vaginal dryness, dyspareunia, dysuria.  Also with mental fogginess, irritability, weight gain, decreased libido.  Would like to discuss hormone replacement therapy    Pathology report:    Pathology Results (Last result in 90 days)                03/31/25 5121  Narrative:                                                                                                                                     Pathology Specimen  [facility]                          DEPARTMENT OF PATHOLOGY                    [address] [place]  [address]              Phone [phone]          Fax [phone]  Aleah Garcia M.D.     Sonali Feng M.D.     FIORELLA Domínguez D.O. Jennifer Jeung, M.D. Arash Mohtashamian, M.D. Aaron G. Novotny, D.REBECCA    Patient:    RAEGAN CAMERON      Specimen    [id number]                                           #:      Copies to:                        SURGICAL PATHOLOGY CONSULTATION      FINAL DIAGNOSIS:    A. Uterine polyp:         Multiple fragments of endometrial polyp(s), negative for          atypical hyperplasia and malignancy  B. Endometrial curettings:         Multiple fragments of endometrial polyp(s), negative for          atypical hyperplasia and malignancy                                        Diagnosis performed by:                    "                   DEREK SHEPARD DO  ------------------------------------------------------------------------  ---------------------------------------------------------------  CODES: 2003x2  [id number]    SPECIMEN(S)  A. Uterine polyp:  B. Endometrial curettings:    PREOPERATIVE DIAGNOSIS:  Postmenopausal bleeding    POSTOPERATIVE DIAGNOSIS:  Postmenopausal bleeding       GROSS DESCRIPTION:  A. Received in formalin labeled with the patient's name, medical record  number, and \"uterine polyp\" is a mesh sock from which a roughly 2.6 cm  aggregate of tan-pink, rubbery tissue fragments is removed.  3  /[id number] [facility]    B. Received in formalin labeled with the patient's name, medical record  number, and \"[facility]C\" is a bloodstained Telfa pad from which a 1 cm  aggregate of pink-red mucoid debris is removed.  The formalin is  filtered, and infiltrate is held for further possible studies.  1  /[id number] [facility]    MICROSCOPIC DESCRIPTION:  Microscopic examination was performed.  Please see diagnosis.    Report electronically signed by:   DEREK SHEPARD DO ,  Diagnosis performed at:  [facility]  Pathology  Department, [address] [place] [address]  [id number]      Printed 00/00/0000 [id number] RAEGAN CAMERON   Page 1 of 1 MRN#:[id number]                    Physical exam:  Virtual visit    Assessment: 69 y.o. female here for postop visit    Plan:  1. Vaginal atrophy  - estradiol (ESTRACE VAGINAL) 0.1 MG/GM vaginal cream; Apply 1g cream inside vagina using applicator nightly for 2 weeks, then twice per week thereafter. For refills, continue to take twice per week.  Dispense: 1 Each; Refill: 6    2. Postoperative visit  - Doing well, meeting milestones       Reviewed hormone replacement therapy indications.  Reviewed systemic versus local.  Reviewed indication for systemic patients under the age of 60 all within 10 years of menopause.  After that is still safe, however increased risk of VTE or cardiovascular disease.  " We have decided to start vaginal estrogen for now.  Will readdress her symptoms in 2 to 3 months.    F/u with me 2-3m      Tonya Nolan MD

## 2025-04-17 ENCOUNTER — TELEPHONE (OUTPATIENT)
Dept: OBGYN | Facility: CLINIC | Age: 70
End: 2025-04-17
Payer: MEDICARE

## 2025-04-18 ENCOUNTER — OFFICE VISIT (OUTPATIENT)
Dept: MEDICAL GROUP | Facility: PHYSICIAN GROUP | Age: 70
End: 2025-04-18
Payer: MEDICARE

## 2025-04-18 ENCOUNTER — RESULTS FOLLOW-UP (OUTPATIENT)
Dept: MEDICAL GROUP | Facility: PHYSICIAN GROUP | Age: 70
End: 2025-04-18

## 2025-04-18 VITALS
WEIGHT: 152 LBS | SYSTOLIC BLOOD PRESSURE: 132 MMHG | HEIGHT: 63 IN | OXYGEN SATURATION: 94 % | RESPIRATION RATE: 16 BRPM | HEART RATE: 89 BPM | TEMPERATURE: 97.2 F | BODY MASS INDEX: 26.93 KG/M2 | DIASTOLIC BLOOD PRESSURE: 86 MMHG

## 2025-04-18 DIAGNOSIS — J02.9 SORE THROAT: ICD-10-CM

## 2025-04-18 LAB — S PYO DNA SPEC NAA+PROBE: NOT DETECTED

## 2025-04-18 PROCEDURE — 99213 OFFICE O/P EST LOW 20 MIN: CPT | Performed by: FAMILY MEDICINE

## 2025-04-18 PROCEDURE — 3075F SYST BP GE 130 - 139MM HG: CPT | Performed by: FAMILY MEDICINE

## 2025-04-18 PROCEDURE — 3079F DIAST BP 80-89 MM HG: CPT | Performed by: FAMILY MEDICINE

## 2025-04-18 PROCEDURE — 87651 STREP A DNA AMP PROBE: CPT | Performed by: FAMILY MEDICINE

## 2025-04-18 ASSESSMENT — FIBROSIS 4 INDEX: FIB4 SCORE: 1.82

## 2025-04-19 NOTE — PROGRESS NOTES
Subjective:   Ruth Shoemaker is a 69 y.o. female here today for evaluation and management of:     Sore throat  Pt has sore throat for about 6 days.   She has no fever, cough, ear pain, diarrhea or vomiting.   No exudates on tonsils.   Advised supportive care with tylenol/ibuprofen, mucinex for phlegm, hydration  She would like strep test. I will call her with results.          Current medicines (including changes today)  Current Outpatient Medications   Medication Sig Dispense Refill    estradiol (ESTRACE VAGINAL) 0.1 MG/GM vaginal cream Apply 1g cream inside vagina using applicator nightly for 2 weeks, then twice per week thereafter. For refills, continue to take twice per week. 1 Each 6    famotidine (PEPCID) 40 MG Tab Take 1 tablet by mouth twice daily 180 Tablet 3    losartan (COZAAR) 100 MG Tab Take 1 tablet by mouth once daily 90 Tablet 3    pantoprazole (PROTONIX) 40 MG Tablet Delayed Response Take 1 tablet by mouth once daily 90 Tablet 3    simvastatin (ZOCOR) 20 MG Tab TAKE 1 TABLET BY MOUTH ONCE DAILY IN THE EVENING 90 Tablet 3    aspirin 81 MG EC tablet Take 81 mg by mouth every day.      Misc Natural Products (IMMUNE FORMULA PO) Take  by mouth every day.      Iodine, Kelp, (KELP PO) Take  by mouth every day.      MAGNESIUM OXIDE PO Take  by mouth every day.      CRANBERRY PO Take  by mouth every day.      ST CODY WORT PO Take  by mouth every day.      APPLE CIDER VINEGAR PO Take  by mouth every day.      Pyridoxine HCl (B-6 PO) Take  by mouth every day.      Cyanocobalamin (B-12 PO) Take  by mouth.      VITAMIN E PO Take  by mouth.      Grape Seed Oil Take  by mouth every day.      GARLIC PO Take  by mouth every day.      Turmeric (QC TUMERIC COMPLEX PO) Take  by mouth every day.      Menaquinone-7 (K2 PO) Take  by mouth every day.      Flax Oil-Fish Oil-Borage Oil (FISH-FLAX-BORAGE) Cap Take  by mouth every day.      Probiotic Product (DIGESTIVE ADVANTAGE PO) Take  by mouth every day.       "FIBER GUMMIES PO Take  by mouth every day.      Ginkgo Biloba (GINKOBA PO) Take  by mouth every day.      Misc Natural Products (BEET ROOT PO) Take  by mouth every day.      TART CHERRY PO Take  by mouth every day.      amLODIPine (NORVASC) 5 MG Tab TAKE 1 TABLET BY MOUTH ONCE DAILY FOR HIGH BLOOD PRESSURE 90 Tablet 3    dicyclomine (BENTYL) 10 MG Cap TAKE 1 CAPSULE BY MOUTH 4 TIMES DAILY BEFORE MEAL(S) AND AT BEDTIME (Patient taking differently: 2 (two) times a day. TAKE 1 CAPSULE BY MOUTH 4 TIMES DAILY BEFORE MEAL(S) AND AT BEDTIME) 360 Capsule 3    solifenacin (VESICARE) 10 MG tablet Take 1 Tablet by mouth every day. Brand name only--please dispense brand name as pt does not tolerate the generic version of medication, please dispense 90 Tablet 3    vitamin D (CHOLECALCIFEROL) 1000 UNIT Tab Take 1,000 Units by mouth every day.       No current facility-administered medications for this visit.     She  has a past medical history of GERD (gastroesophageal reflux disease), Gynecological disorder, Hemorrhagic disorder (HCC), High cholesterol, Hyperlipidemia, Hypertension, PONV (postoperative nausea and vomiting), Urinary bladder disorder, and Urinary incontinence.    ROS  No chest pain, no shortness of breath, no abdominal pain       Objective:     /86 (BP Location: Left arm, Patient Position: Sitting, BP Cuff Size: Adult)   Pulse 89   Temp 36.2 °C (97.2 °F) (Temporal)   Resp 16   Ht 1.6 m (5' 3\")   Wt 68.9 kg (152 lb)   SpO2 94%  Body mass index is 26.93 kg/m².   Physical Exam:  Constitutional: Alert, no distress.  Skin: Warm, dry, good turgor, no rashes in visible areas.  Eye: Equal, round and reactive, conjunctiva clear, lids normal.  ENMT: Lips without lesions, good dentition, oropharynx clear.  Neck: Trachea midline, no masses, no thyromegaly. No cervical or supraclavicular lymphadenopathy  Respiratory: Unlabored respiratory effort, lungs clear to auscultation, no wheezes, no ronchi.  Cardiovascular: " Normal S1, S2, no murmur, no edema.  Abdomen: Soft, non-tender, no masses, no hepatosplenomegaly.  Psych: Alert and oriented x3, normal affect and mood.    Assessment and Plan:   The following treatment plan was discussed    1. Sore throat  - POCT CEPHEID GROUP A STREP - PCR      Followup: No follow-ups on file.

## 2025-04-19 NOTE — ASSESSMENT & PLAN NOTE
Pt has sore throat for about 6 days.   She has no fever, cough, ear pain, diarrhea or vomiting.   No exudates on tonsils.   Advised supportive care with tylenol/ibuprofen, mucinex for phlegm, hydration  She would like strep test. I will call her with results.

## 2025-05-16 ENCOUNTER — HOSPITAL ENCOUNTER (OUTPATIENT)
Dept: LAB | Facility: MEDICAL CENTER | Age: 70
End: 2025-05-16
Attending: FAMILY MEDICINE
Payer: MEDICARE

## 2025-05-16 DIAGNOSIS — R79.89 ELEVATED LFTS: ICD-10-CM

## 2025-05-16 PROCEDURE — 80053 COMPREHEN METABOLIC PANEL: CPT

## 2025-05-16 PROCEDURE — 36415 COLL VENOUS BLD VENIPUNCTURE: CPT

## 2025-05-17 LAB
ALBUMIN SERPL BCP-MCNC: 4.6 G/DL (ref 3.2–4.9)
ALBUMIN/GLOB SERPL: 1.6 G/DL
ALP SERPL-CCNC: 141 U/L (ref 30–99)
ALT SERPL-CCNC: 113 U/L (ref 2–50)
ANION GAP SERPL CALC-SCNC: 10 MMOL/L (ref 7–16)
AST SERPL-CCNC: 57 U/L (ref 12–45)
BILIRUB SERPL-MCNC: 0.6 MG/DL (ref 0.1–1.5)
BUN SERPL-MCNC: 15 MG/DL (ref 8–22)
CALCIUM ALBUM COR SERPL-MCNC: 9.2 MG/DL (ref 8.5–10.5)
CALCIUM SERPL-MCNC: 9.7 MG/DL (ref 8.5–10.5)
CHLORIDE SERPL-SCNC: 105 MMOL/L (ref 96–112)
CO2 SERPL-SCNC: 25 MMOL/L (ref 20–33)
CREAT SERPL-MCNC: 0.93 MG/DL (ref 0.5–1.4)
GFR SERPLBLD CREATININE-BSD FMLA CKD-EPI: 67 ML/MIN/1.73 M 2
GLOBULIN SER CALC-MCNC: 2.8 G/DL (ref 1.9–3.5)
GLUCOSE SERPL-MCNC: 99 MG/DL (ref 65–99)
POTASSIUM SERPL-SCNC: 4.2 MMOL/L (ref 3.6–5.5)
PROT SERPL-MCNC: 7.4 G/DL (ref 6–8.2)
SODIUM SERPL-SCNC: 140 MMOL/L (ref 135–145)

## 2025-05-20 ENCOUNTER — OFFICE VISIT (OUTPATIENT)
Dept: MEDICAL GROUP | Facility: PHYSICIAN GROUP | Age: 70
End: 2025-05-20
Payer: MEDICARE

## 2025-05-20 VITALS
BODY MASS INDEX: 27.07 KG/M2 | TEMPERATURE: 97.2 F | OXYGEN SATURATION: 98 % | DIASTOLIC BLOOD PRESSURE: 72 MMHG | HEIGHT: 63 IN | HEART RATE: 75 BPM | RESPIRATION RATE: 18 BRPM | WEIGHT: 152.8 LBS | SYSTOLIC BLOOD PRESSURE: 122 MMHG

## 2025-05-20 DIAGNOSIS — R79.89 ELEVATED LFTS: Primary | ICD-10-CM

## 2025-05-20 DIAGNOSIS — R79.89 ELEVATED FERRITIN: ICD-10-CM

## 2025-05-20 PROCEDURE — 3078F DIAST BP <80 MM HG: CPT | Performed by: FAMILY MEDICINE

## 2025-05-20 PROCEDURE — 99214 OFFICE O/P EST MOD 30 MIN: CPT | Performed by: FAMILY MEDICINE

## 2025-05-20 PROCEDURE — 3074F SYST BP LT 130 MM HG: CPT | Performed by: FAMILY MEDICINE

## 2025-05-20 ASSESSMENT — FIBROSIS 4 INDEX: FIB4 SCORE: 1.81

## 2025-05-20 NOTE — ASSESSMENT & PLAN NOTE
Elevated lfts improving slowly  On simvastatin for 3 years, no issues in the past  Repeat lab for lfts, ruq us and repeat lab for elevated ferritin ordered  Neg hep C 2020  Full hep panel ordered

## 2025-06-09 ENCOUNTER — TELEMEDICINE (OUTPATIENT)
Dept: GYNECOLOGY | Facility: CLINIC | Age: 70
End: 2025-06-09
Payer: MEDICARE

## 2025-06-09 DIAGNOSIS — N39.3 STRESS INCONTINENCE OF URINE: ICD-10-CM

## 2025-06-09 DIAGNOSIS — N95.8 GENITOURINARY SYNDROME OF MENOPAUSE: Primary | ICD-10-CM

## 2025-06-09 PROCEDURE — 98005 SYNCH AUDIO-VIDEO EST LOW 20: CPT | Performed by: STUDENT IN AN ORGANIZED HEALTH CARE EDUCATION/TRAINING PROGRAM

## 2025-06-10 NOTE — PROGRESS NOTES
This evaluation was conducted via telephone using secure and encrypted videoconferencing technology. The patient was in their home in the [facility].    The patient's identity was confirmed and verbal consent was obtained for this virtual visit.     Gynecology Visit - F/u       CC: dyspareunia      HPI  Ruth Bozena Shoemaker is a 69 y.o. female  presenting today for the above.  Patient s/p HSC polypectomy with me in March. Benign path. No bleeding since. Has significant dyspareunia for years, on vaginal estrogen 2x week which mildly alleviates her sx. Reports urinary incontinence with cough, laugh, sneeze. Desires to discuss systemic HRT, has hx of HTN and HLD on meds.       OB History    Para Term  AB Living   3 2 2  1 2   SAB IAB Ectopic Molar Multiple Live Births   1     2      # Outcome Date GA Lbr Shawn/2nd Weight Sex Type Anes PTL Lv   3 Term 97 40w0d   M Vag-Spont   CHARMAINE   2 Term 10/15/76 40w0d   F Vag-Spont   CHARMAINE   1 SAB 1974               Past Medical History  Past Medical History[1]    Past Surgical History  Past Surgical History[2]    Social History  Social History[3]     Family History  Family History   Problem Relation Age of Onset    Hypertension Mother     Glaucoma Mother     Diabetes Father     Heart Attack Father     Stroke Father     Heart Disease Father     Hypertension Father     Hyperlipidemia Father     Glaucoma Father        Home Medications  Current Outpatient Medications   Medication Sig    estradiol (ESTRACE VAGINAL) 0.1 MG/GM vaginal cream Apply 1g cream inside vagina using applicator nightly for 2 weeks, then twice per week thereafter. For refills, continue to take twice per week.    famotidine (PEPCID) 40 MG Tab Take 1 tablet by mouth twice daily    losartan (COZAAR) 100 MG Tab Take 1 tablet by mouth once daily    pantoprazole (PROTONIX) 40 MG Tablet Delayed Response Take 1 tablet by mouth once daily    simvastatin (ZOCOR) 20 MG Tab TAKE 1 TABLET BY MOUTH  ONCE DAILY IN THE EVENING    aspirin 81 MG EC tablet Take 81 mg by mouth every day.    amLODIPine (NORVASC) 5 MG Tab TAKE 1 TABLET BY MOUTH ONCE DAILY FOR HIGH BLOOD PRESSURE    dicyclomine (BENTYL) 10 MG Cap TAKE 1 CAPSULE BY MOUTH 4 TIMES DAILY BEFORE MEAL(S) AND AT BEDTIME (Patient taking differently: 2 (two) times a day. TAKE 1 CAPSULE BY MOUTH 4 TIMES DAILY BEFORE MEAL(S) AND AT BEDTIME)    solifenacin (VESICARE) 10 MG tablet Take 1 Tablet by mouth every day. Brand name only--please dispense brand name as pt does not tolerate the generic version of medication, please dispense    vitamin D (CHOLECALCIFEROL) 1000 UNIT Tab Take 1,000 Units by mouth every day.       Allergies/Reactions  Allergies[4]       ROS: I have reviewed all systems with patient. Pertinent positive and negative findings are listed below except for what is otherwise stated in the History of Present Illness.       Objective:    Labs  Lab Results   Component Value Date/Time    WBC 6.7 03/12/2025 02:09 PM    RBC 5.24 03/12/2025 02:09 PM    HEMOGLOBIN 15.5 03/12/2025 02:09 PM    HEMATOCRIT 47.6 (H) 03/12/2025 02:09 PM    MCV 90.8 03/12/2025 02:09 PM    MCH 29.6 03/12/2025 02:09 PM    MCHC 32.6 03/12/2025 02:09 PM    RDW 42.9 03/12/2025 02:09 PM    PLATELETCT 201 03/12/2025 02:09 PM    MPV 13.0 (H) 03/12/2025 02:09 PM    NEUTSPOLYS 56.10 03/12/2025 02:09 PM    LYMPHOCYTES 33.80 03/12/2025 02:09 PM    MONOCYTES 7.50 03/12/2025 02:09 PM    EOSINOPHILS 1.80 03/12/2025 02:09 PM    BASOPHILS 0.70 03/12/2025 02:09 PM    IMMGRAN 0.10 03/12/2025 02:09 PM    NRBC 0.00 03/12/2025 02:09 PM    NEUTS 3.75 03/12/2025 02:09 PM    LYMPHS 2.26 03/12/2025 02:09 PM    MONOS 0.50 03/12/2025 02:09 PM    EOS 0.12 03/12/2025 02:09 PM    BASO 0.05 03/12/2025 02:09 PM    IMMGRANAB 0.01 03/12/2025 02:09 PM    NRBCAB 0.00 03/12/2025 02:09 PM       Lab Results   Component Value Date/Time    HBA1C 5.5 03/12/2025 02:09 PM         Physical Exam  Virtual visit    Assessment:  70yo    Postmenopausal  S/p HSC polypectomy for PMB    Vulvovaginal atrophy  Dyspareunia       Plan:  1. Genitourinary syndrome of menopause  Recommend increasing vaginal estrogen to 3x week and start vaginal moisturizers such as replens. Recommend lubrication with IC. Consider vaginal dilators. I calculated her ASCVD risk, which is moderate. Due to moderate risk and age, do not recommend systemic HRT for dyspareunia.    2. Stress incontinence of urine  - Referral to urogynecology         Tonya Nolan MD       [1]   Past Medical History:  Diagnosis Date    GERD (gastroesophageal reflux disease)     Gynecological disorder     Hemorrhagic disorder (HCC)     vaginal/ rectal bleeding    High cholesterol     Hyperlipidemia     Hypertension     on medication    PONV (postoperative nausea and vomiting)     years old    Urinary bladder disorder     Leaking When Cough or Sneezeing    Urinary incontinence    [2]   Past Surgical History:  Procedure Laterality Date    NV HYSTEROSCOPY,DX,SEP PROC N/A 3/31/2025    Procedure: HYSTEROSCOPY, DILATION AND CURETTAGE, AND POLYPECTOMY WITH MYOSURE;  Surgeon: Tonya Nolan M.D.;  Location: SURGERY DAY SURGERY [facility];  Service: Gynecology    COLONOSCOPY      OTHER      surgery for deviated septum   [3]   Social History  Tobacco Use    Smoking status: Former     Types: Cigarettes     Passive exposure: Current    Smokeless tobacco: Never    Tobacco comments:     Quit 10 Years Ago, pt unsure of how long she smoked for   Vaping Use    Vaping status: Never Used   Substance Use Topics    Alcohol use: Never    Drug use: Never   [4]   Allergies  Allergen Reactions    Codeine Nausea

## 2025-06-12 NOTE — Clinical Note
REFERRAL APPROVAL NOTICE         Sent on June 12, 2025                   Ruth Shoemaker  [address]   [place] [address]                   Dear Ms. Shoemaker,    After a careful review of the medical information and benefit coverage, [facility] has processed your referral. See below for additional details.    If applicable, you must be actively enrolled with your insurance for coverage of the authorized service. If you have any questions regarding your coverage, please contact your insurance directly.    REFERRAL INFORMATION   Referral #:  [id number]  Referred-To Department    Referred-By Provider:  Gynecology    Tonya Nolan M.D.   [facility]      [address]  [address]  [place] [id number] [phone] [address]  [place] [id number] [phone]    Referral Start Date:  06/09/2025  Referral End Date:   06/09/2026             SCHEDULING  If you do not already have an appointment, please call [phone] to make an appointment.     MORE INFORMATION  If you do not already have a [email or web address] account, sign up at: [email or web address]  You can access your medical information, make appointments, see lab results, billing information, and more.  If you have questions regarding this referral, please contact  the [facility] Referrals department at:             [phone]. Monday - Friday 8:00AM - 5:00PM.     Sincerely,    [facility]

## 2025-06-23 DIAGNOSIS — K21.9 GASTROESOPHAGEAL REFLUX DISEASE WITHOUT ESOPHAGITIS: ICD-10-CM

## 2025-06-23 DIAGNOSIS — R10.13 EPIGASTRIC PAIN: ICD-10-CM

## 2025-06-24 RX ORDER — DICYCLOMINE HYDROCHLORIDE 10 MG/1
CAPSULE ORAL
Qty: 360 CAPSULE | Refills: 3 | Status: SHIPPED | OUTPATIENT
Start: 2025-06-24

## 2025-06-24 NOTE — TELEPHONE ENCOUNTER
Received request via: Patient    Was the patient seen in the last year in this department? Yes    Does the patient have an active prescription (recently filled or refills available) for medication(s) requested? No    Pharmacy Name: [facility]    Does the patient have care home Plus and need 100-day supply? (This applies to ALL medications) Patient does not have SCP

## 2025-07-23 DIAGNOSIS — R79.89 ELEVATED FERRITIN: ICD-10-CM

## 2025-07-23 DIAGNOSIS — R79.89 ELEVATED LFTS: ICD-10-CM

## 2025-08-13 DIAGNOSIS — N95.0 POSTMENOPAUSAL BLEEDING: ICD-10-CM

## 2025-08-22 ENCOUNTER — OFFICE VISIT (OUTPATIENT)
Dept: MEDICAL GROUP | Facility: PHYSICIAN GROUP | Age: 70
End: 2025-08-22
Payer: MEDICARE

## 2025-08-22 ENCOUNTER — HOSPITAL ENCOUNTER (OUTPATIENT)
Dept: LAB | Facility: MEDICAL CENTER | Age: 70
End: 2025-08-22
Attending: FAMILY MEDICINE
Payer: MEDICARE

## 2025-08-22 VITALS
SYSTOLIC BLOOD PRESSURE: 130 MMHG | TEMPERATURE: 97.8 F | RESPIRATION RATE: 16 BRPM | WEIGHT: 150 LBS | DIASTOLIC BLOOD PRESSURE: 78 MMHG | BODY MASS INDEX: 26.58 KG/M2 | OXYGEN SATURATION: 96 % | HEIGHT: 63 IN | HEART RATE: 92 BPM

## 2025-08-22 DIAGNOSIS — R79.89 ELEVATED LFTS: Primary | ICD-10-CM

## 2025-08-22 LAB
ALBUMIN SERPL BCP-MCNC: 4.8 G/DL (ref 3.2–4.9)
ALBUMIN/GLOB SERPL: 1.5 G/DL
ALP SERPL-CCNC: 100 U/L (ref 30–99)
ALT SERPL-CCNC: 59 U/L (ref 2–50)
ANION GAP SERPL CALC-SCNC: 12 MMOL/L (ref 7–16)
AST SERPL-CCNC: 36 U/L (ref 12–45)
BILIRUB SERPL-MCNC: 0.8 MG/DL (ref 0.1–1.5)
BUN SERPL-MCNC: 10 MG/DL (ref 8–22)
CALCIUM ALBUM COR SERPL-MCNC: 9.5 MG/DL (ref 8.5–10.5)
CALCIUM SERPL-MCNC: 10.1 MG/DL (ref 8.5–10.5)
CHLORIDE SERPL-SCNC: 105 MMOL/L (ref 96–112)
CO2 SERPL-SCNC: 24 MMOL/L (ref 20–33)
CREAT SERPL-MCNC: 1.03 MG/DL (ref 0.5–1.4)
FERRITIN SERPL-MCNC: 340 NG/ML (ref 10–291)
GFR SERPLBLD CREATININE-BSD FMLA CKD-EPI: 59 ML/MIN/1.73 M 2
GLOBULIN SER CALC-MCNC: 3.2 G/DL (ref 1.9–3.5)
GLUCOSE SERPL-MCNC: 100 MG/DL (ref 65–99)
HAV IGM SERPL QL IA: NORMAL
HBV CORE IGM SER QL: NORMAL
HBV SURFACE AG SER QL: NORMAL
HCV AB SER QL: NORMAL
POTASSIUM SERPL-SCNC: 3.8 MMOL/L (ref 3.6–5.5)
PROT SERPL-MCNC: 8 G/DL (ref 6–8.2)
SODIUM SERPL-SCNC: 141 MMOL/L (ref 135–145)

## 2025-08-22 PROCEDURE — 3078F DIAST BP <80 MM HG: CPT | Performed by: FAMILY MEDICINE

## 2025-08-22 PROCEDURE — 80074 ACUTE HEPATITIS PANEL: CPT | Mod: GA

## 2025-08-22 PROCEDURE — 36415 COLL VENOUS BLD VENIPUNCTURE: CPT

## 2025-08-22 PROCEDURE — 99214 OFFICE O/P EST MOD 30 MIN: CPT | Performed by: FAMILY MEDICINE

## 2025-08-22 PROCEDURE — 3075F SYST BP GE 130 - 139MM HG: CPT | Performed by: FAMILY MEDICINE

## 2025-08-22 PROCEDURE — 80053 COMPREHEN METABOLIC PANEL: CPT

## 2025-08-22 PROCEDURE — 82728 ASSAY OF FERRITIN: CPT

## 2025-08-22 ASSESSMENT — FIBROSIS 4 INDEX: FIB4 SCORE: 1.81

## (undated) DEVICE — Device

## (undated) DEVICE — GLOVE SZ 6 BIOGEL PI MICRO - PF LF (50PR/BX 4BX/CA)

## (undated) DEVICE — GLOVE SZ 6.5 BIOGEL PI MICRO - PF LF (50PR/BX)

## (undated) DEVICE — DEVICE REMOVAL MYOSURE TISSUE (3EA/BX)

## (undated) DEVICE — TOWELS CLOTH SURGICAL - (4/PK 20PK/CA)

## (undated) DEVICE — SET TUBING AQUILEX IN-FLOW MYOSURE (10EA/BX)

## (undated) DEVICE — SET TUBING AQUILEX OUT-FLOW MYOSURE (10EA/BX)

## (undated) DEVICE — PAD SANITARY 11IN MAXI IND WRAPPED (12EA/PK 24PK/CA)

## (undated) DEVICE — SPECIMEN PLASTIC CONVERTOR - (10/CA)

## (undated) DEVICE — SODIUM CHL IRRIGATION 0.9% 1000ML (12EA/CA)

## (undated) DEVICE — SODIUM CHL. IRRIGATION 0.9% 3000ML (4EA/CA 65CA/PF)